# Patient Record
Sex: FEMALE | Race: WHITE | NOT HISPANIC OR LATINO | Employment: UNEMPLOYED | URBAN - METROPOLITAN AREA
[De-identification: names, ages, dates, MRNs, and addresses within clinical notes are randomized per-mention and may not be internally consistent; named-entity substitution may affect disease eponyms.]

---

## 2024-02-20 ENCOUNTER — HOSPITAL ENCOUNTER (EMERGENCY)
Facility: HOSPITAL | Age: 24
Discharge: HOME | End: 2024-02-20
Attending: EMERGENCY MEDICINE

## 2024-02-20 VITALS
HEIGHT: 62 IN | OXYGEN SATURATION: 100 % | SYSTOLIC BLOOD PRESSURE: 106 MMHG | TEMPERATURE: 99 F | RESPIRATION RATE: 18 BRPM | BODY MASS INDEX: 19.88 KG/M2 | HEART RATE: 73 BPM | DIASTOLIC BLOOD PRESSURE: 60 MMHG | WEIGHT: 108 LBS

## 2024-02-20 DIAGNOSIS — Z34.91 FIRST TRIMESTER PREGNANCY: ICD-10-CM

## 2024-02-20 DIAGNOSIS — R11.2 NAUSEA AND VOMITING, UNSPECIFIED VOMITING TYPE: Primary | ICD-10-CM

## 2024-02-20 LAB
ABO GROUP BLD: NORMAL
ALBUMIN SERPL BCP-MCNC: 4.5 G/DL (ref 3.5–5)
ALP SERPL-CCNC: 35 U/L (ref 34–104)
ALT SERPL W P-5'-P-CCNC: 36 U/L (ref 7–52)
ANION GAP SERPL CALCULATED.3IONS-SCNC: 5 MMOL/L
AST SERPL W P-5'-P-CCNC: 22 U/L (ref 13–39)
B-HCG SERPL-ACNC: ABNORMAL MIU/ML (ref 0–5)
BASOPHILS # BLD AUTO: 0.05 THOUSANDS/ÂΜL (ref 0–0.1)
BASOPHILS NFR BLD AUTO: 1 % (ref 0–1)
BILIRUB SERPL-MCNC: 1.02 MG/DL (ref 0.2–1)
BILIRUB UR QL STRIP: NEGATIVE
BLD GP AB SCN SERPL QL: NEGATIVE
BUN SERPL-MCNC: 9 MG/DL (ref 5–25)
CALCIUM SERPL-MCNC: 9.7 MG/DL (ref 8.4–10.2)
CHLORIDE SERPL-SCNC: 103 MMOL/L (ref 96–108)
CLARITY UR: CLEAR
CO2 SERPL-SCNC: 26 MMOL/L (ref 21–32)
COLOR UR: NORMAL
CREAT SERPL-MCNC: 0.66 MG/DL (ref 0.6–1.3)
EOSINOPHIL # BLD AUTO: 0.07 THOUSAND/ÂΜL (ref 0–0.61)
EOSINOPHIL NFR BLD AUTO: 1 % (ref 0–6)
ERYTHROCYTE [DISTWIDTH] IN BLOOD BY AUTOMATED COUNT: 11.6 % (ref 11.6–15.1)
EXT PREGNANCY TEST URINE: POSITIVE
EXT. CONTROL: ABNORMAL
GFR SERPL CREATININE-BSD FRML MDRD: 124 ML/MIN/1.73SQ M
GLUCOSE SERPL-MCNC: 99 MG/DL (ref 65–140)
GLUCOSE UR STRIP-MCNC: NEGATIVE MG/DL
HCT VFR BLD AUTO: 34.6 % (ref 34.8–46.1)
HGB BLD-MCNC: 12 G/DL (ref 11.5–15.4)
HGB UR QL STRIP.AUTO: NEGATIVE
IMM GRANULOCYTES # BLD AUTO: 0.02 THOUSAND/UL (ref 0–0.2)
IMM GRANULOCYTES NFR BLD AUTO: 0 % (ref 0–2)
KETONES UR STRIP-MCNC: NEGATIVE MG/DL
LEUKOCYTE ESTERASE UR QL STRIP: NEGATIVE
LYMPHOCYTES # BLD AUTO: 1.66 THOUSANDS/ÂΜL (ref 0.6–4.47)
LYMPHOCYTES NFR BLD AUTO: 19 % (ref 14–44)
MAGNESIUM SERPL-MCNC: 2 MG/DL (ref 1.9–2.7)
MCH RBC QN AUTO: 30 PG (ref 26.8–34.3)
MCHC RBC AUTO-ENTMCNC: 34.7 G/DL (ref 31.4–37.4)
MCV RBC AUTO: 87 FL (ref 82–98)
MONOCYTES # BLD AUTO: 0.83 THOUSAND/ÂΜL (ref 0.17–1.22)
MONOCYTES NFR BLD AUTO: 10 % (ref 4–12)
NEUTROPHILS # BLD AUTO: 5.95 THOUSANDS/ÂΜL (ref 1.85–7.62)
NEUTS SEG NFR BLD AUTO: 69 % (ref 43–75)
NITRITE UR QL STRIP: NEGATIVE
NRBC BLD AUTO-RTO: 0 /100 WBCS
PH UR STRIP.AUTO: 7 [PH]
PLATELET # BLD AUTO: 240 THOUSANDS/UL (ref 149–390)
PMV BLD AUTO: 9.6 FL (ref 8.9–12.7)
POTASSIUM SERPL-SCNC: 3.8 MMOL/L (ref 3.5–5.3)
PROT SERPL-MCNC: 6.9 G/DL (ref 6.4–8.4)
PROT UR STRIP-MCNC: NEGATIVE MG/DL
RBC # BLD AUTO: 4 MILLION/UL (ref 3.81–5.12)
RH BLD: NEGATIVE
SODIUM SERPL-SCNC: 134 MMOL/L (ref 135–147)
SP GR UR STRIP.AUTO: 1.01 (ref 1–1.03)
SPECIMEN EXPIRATION DATE: NORMAL
UROBILINOGEN UR STRIP-ACNC: <2 MG/DL
WBC # BLD AUTO: 8.58 THOUSAND/UL (ref 4.31–10.16)

## 2024-02-20 PROCEDURE — 96376 TX/PRO/DX INJ SAME DRUG ADON: CPT

## 2024-02-20 PROCEDURE — 36415 COLL VENOUS BLD VENIPUNCTURE: CPT | Performed by: EMERGENCY MEDICINE

## 2024-02-20 PROCEDURE — 84702 CHORIONIC GONADOTROPIN TEST: CPT | Performed by: EMERGENCY MEDICINE

## 2024-02-20 PROCEDURE — 81003 URINALYSIS AUTO W/O SCOPE: CPT | Performed by: EMERGENCY MEDICINE

## 2024-02-20 PROCEDURE — 87086 URINE CULTURE/COLONY COUNT: CPT | Performed by: EMERGENCY MEDICINE

## 2024-02-20 PROCEDURE — 86900 BLOOD TYPING SEROLOGIC ABO: CPT | Performed by: EMERGENCY MEDICINE

## 2024-02-20 PROCEDURE — 86901 BLOOD TYPING SEROLOGIC RH(D): CPT | Performed by: EMERGENCY MEDICINE

## 2024-02-20 PROCEDURE — 83735 ASSAY OF MAGNESIUM: CPT | Performed by: EMERGENCY MEDICINE

## 2024-02-20 PROCEDURE — 80053 COMPREHEN METABOLIC PANEL: CPT | Performed by: EMERGENCY MEDICINE

## 2024-02-20 PROCEDURE — 96361 HYDRATE IV INFUSION ADD-ON: CPT

## 2024-02-20 PROCEDURE — 81025 URINE PREGNANCY TEST: CPT | Performed by: EMERGENCY MEDICINE

## 2024-02-20 PROCEDURE — 86850 RBC ANTIBODY SCREEN: CPT | Performed by: EMERGENCY MEDICINE

## 2024-02-20 PROCEDURE — 96374 THER/PROPH/DIAG INJ IV PUSH: CPT

## 2024-02-20 PROCEDURE — 85025 COMPLETE CBC W/AUTO DIFF WBC: CPT | Performed by: EMERGENCY MEDICINE

## 2024-02-20 PROCEDURE — 99283 EMERGENCY DEPT VISIT LOW MDM: CPT

## 2024-02-20 PROCEDURE — 99284 EMERGENCY DEPT VISIT MOD MDM: CPT | Performed by: EMERGENCY MEDICINE

## 2024-02-20 PROCEDURE — 87147 CULTURE TYPE IMMUNOLOGIC: CPT | Performed by: EMERGENCY MEDICINE

## 2024-02-20 RX ORDER — ONDANSETRON 4 MG/1
4 TABLET, ORALLY DISINTEGRATING ORAL EVERY 8 HOURS PRN
Qty: 12 TABLET | Refills: 0 | Status: SHIPPED | OUTPATIENT
Start: 2024-02-20 | End: 2024-02-25

## 2024-02-20 RX ORDER — DEXTROSE AND SODIUM CHLORIDE 5; .9 G/100ML; G/100ML
500 INJECTION, SOLUTION INTRAVENOUS ONCE
Status: DISCONTINUED | OUTPATIENT
Start: 2024-02-20 | End: 2024-02-20

## 2024-02-20 RX ORDER — ONDANSETRON 4 MG/1
4 TABLET, ORALLY DISINTEGRATING ORAL ONCE
Status: COMPLETED | OUTPATIENT
Start: 2024-02-20 | End: 2024-02-20

## 2024-02-20 RX ORDER — ONDANSETRON 2 MG/ML
4 INJECTION INTRAMUSCULAR; INTRAVENOUS ONCE
Status: COMPLETED | OUTPATIENT
Start: 2024-02-20 | End: 2024-02-20

## 2024-02-20 RX ADMIN — ONDANSETRON 4 MG: 4 TABLET, ORALLY DISINTEGRATING ORAL at 18:45

## 2024-02-20 RX ADMIN — ONDANSETRON 4 MG: 2 INJECTION INTRAMUSCULAR; INTRAVENOUS at 18:09

## 2024-02-20 RX ADMIN — ONDANSETRON 4 MG: 2 INJECTION INTRAMUSCULAR; INTRAVENOUS at 16:53

## 2024-02-20 RX ADMIN — SODIUM CHLORIDE 1000 ML: 0.9 INJECTION, SOLUTION INTRAVENOUS at 16:53

## 2024-02-20 NOTE — ED PROVIDER NOTES
History  Chief Complaint   Patient presents with    Vomiting     Patient c/o nausea and vomiting that started over the weekend. Patient reports being 6 weeks pregnant. Patient denies any bleeding or spotting.       History provided by:  Patient  Vomiting  Severity:  Moderate  Duration:  1 week  Timing:  Constant  Quality:  Stomach contents  Able to tolerate:  Liquids  Progression:  Unchanged  Chronicity:  New  Recent urination:  Decreased  Context: not post-tussive and not self-induced    Relieved by:  Nothing  Worsened by:  Nothing  Ineffective treatments:  None tried  Associated symptoms: abdominal pain (epigastric)    Associated symptoms: no diarrhea    Risk factors: pregnant (Pt states she is about 6 weeks pregnancy and LMP was . Has not seen OB yet. Taking prentatal capsules but making nausea worse.Pt is .)    Risk factors: no alcohol use, no diabetes, no prior abdominal surgery, no sick contacts, no suspect food intake and no travel to endemic areas        None       History reviewed. No pertinent past medical history.    History reviewed. No pertinent surgical history.    History reviewed. No pertinent family history.  I have reviewed and agree with the history as documented.    E-Cigarette/Vaping    E-Cigarette Use Never User      E-Cigarette/Vaping Substances     Social History     Tobacco Use    Smoking status: Never    Smokeless tobacco: Never   Vaping Use    Vaping status: Never Used   Substance Use Topics    Alcohol use: Never    Drug use: Never       Review of Systems   Gastrointestinal:  Positive for abdominal pain (epigastric) and vomiting. Negative for diarrhea.   All other systems reviewed and are negative.      Physical Exam  Physical Exam  Vitals and nursing note reviewed.   Constitutional:       General: She is not in acute distress.     Appearance: She is well-developed. She is not diaphoretic.   HENT:      Head: Normocephalic and atraumatic.      Nose: Nose normal.   Eyes:       Extraocular Movements: Extraocular movements intact.      Conjunctiva/sclera: Conjunctivae normal.   Cardiovascular:      Rate and Rhythm: Normal rate and regular rhythm.      Heart sounds: Normal heart sounds.   Pulmonary:      Effort: Pulmonary effort is normal. No respiratory distress.      Breath sounds: Normal breath sounds.   Abdominal:      General: There is no distension.      Palpations: Abdomen is soft.      Tenderness: There is no abdominal tenderness.   Musculoskeletal:         General: No deformity. Normal range of motion.      Cervical back: Neck supple.   Skin:     General: Skin is warm and dry.   Neurological:      General: No focal deficit present.      Mental Status: She is alert and oriented to person, place, and time.      Cranial Nerves: No cranial nerve deficit.      Motor: No weakness.   Psychiatric:         Mood and Affect: Mood normal.         Vital Signs  ED Triage Vitals [02/20/24 1620]   Temperature Pulse Respirations Blood Pressure SpO2   99 °F (37.2 °C) 89 18 113/75 99 %      Temp Source Heart Rate Source Patient Position - Orthostatic VS BP Location FiO2 (%)   Temporal Monitor Sitting Left arm --      Pain Score       --           Vitals:    02/20/24 1620   BP: 113/75   Pulse: 89   Patient Position - Orthostatic VS: Sitting         Visual Acuity      ED Medications  Medications   sodium chloride 0.9 % bolus 1,000 mL (0 mL Intravenous Stopped 2/20/24 1753)   ondansetron (ZOFRAN) injection 4 mg (4 mg Intravenous Given 2/20/24 1653)   ondansetron (ZOFRAN) injection 4 mg (4 mg Intravenous Given 2/20/24 1809)       Diagnostic Studies  Results Reviewed       Procedure Component Value Units Date/Time    Quantitative hCG [[id number]]  (Abnormal) Collected: 02/20/24 1649    Lab Status: Final result Specimen: Blood from Arm, Right Updated: 02/20/24 1747     HCG, Quant 90,563 mIU/mL     Narrative:       Expected Ranges:    HCG results between 5 and 25 mIU/mL may be indicative of early pregnancy  but should be interpreted in light of the total clinical presentation.    HCG can rise to detectable levels in yazmin and post menopausal women (0-11.6 mIU/mL).     Approximate               Approximate HCG  Gestation age          Concentration ( mIU/mL)  _____________          ______________________   Weeks                      HCG values  0.2-1                       5-50  1-2                           2-3                         100-5000  3-4                         500-43605  4-5                         1000-54481  5-6                         [id number]  6-8                         57449-393997  8-12                        67150-708799      Comprehensive metabolic panel [[id number]]  (Abnormal) Collected: 02/20/24 1649    Lab Status: Final result Specimen: Blood from Arm, Right Updated: 02/20/24 1714     Sodium 134 mmol/L      Potassium 3.8 mmol/L      Chloride 103 mmol/L      CO2 26 mmol/L      ANION GAP 5 mmol/L      BUN 9 mg/dL      Creatinine 0.66 mg/dL      Glucose 99 mg/dL      Calcium 9.7 mg/dL      AST 22 U/L      ALT 36 U/L      Alkaline Phosphatase 35 U/L      Total Protein 6.9 g/dL      Albumin 4.5 g/dL      Total Bilirubin 1.02 mg/dL      eGFR 124 ml/min/1.73sq m     Narrative:      National Kidney Disease Foundation guidelines for Chronic Kidney Disease (CKD):     Stage 1 with normal or high GFR (GFR > 90 mL/min/1.73 square meters)    Stage 2 Mild CKD (GFR = 60-89 mL/min/1.73 square meters)    Stage 3A Moderate CKD (GFR = 45-59 mL/min/1.73 square meters)    Stage 3B Moderate CKD (GFR = 30-44 mL/min/1.73 square meters)    Stage 4 Severe CKD (GFR = 15-29 mL/min/1.73 square meters)    Stage 5 End Stage CKD (GFR <15 mL/min/1.73 square meters)  Note: GFR calculation is accurate only with a steady state creatinine    Magnesium [[id number]]  (Normal) Collected: 02/20/24 1649    Lab Status: Final result Specimen: Blood from Arm, Right Updated: 02/20/24 1714     Magnesium 2.0 mg/dL     UA w Reflex to  Microscopic w Reflex to Culture [[id number]] Collected: 02/20/24 1651    Lab Status: Final result Specimen: Urine, Clean Catch Updated: 02/20/24 1701     Color, UA Light Yellow     Clarity, UA Clear     Specific Gravity, UA 1.011     pH, UA 7.0     Leukocytes, UA Negative     Nitrite, UA Negative     Protein, UA Negative mg/dl      Glucose, UA Negative mg/dl      Ketones, UA Negative mg/dl      Urobilinogen, UA <2.0 mg/dl      Bilirubin, UA Negative     Occult Blood, UA Negative     URINE COMMENT --    Urine culture [[id number]] Collected: 02/20/24 1651    Lab Status: In process Specimen: Urine, Clean Catch Updated: 02/20/24 1701    CBC and differential [[id number]]  (Abnormal) Collected: 02/20/24 1649    Lab Status: Final result Specimen: Blood from Arm, Right Updated: 02/20/24 1659     WBC 8.58 Thousand/uL      RBC 4.00 Million/uL      Hemoglobin 12.0 g/dL      Hematocrit 34.6 %      MCV 87 fL      MCH 30.0 pg      MCHC 34.7 g/dL      RDW 11.6 %      MPV 9.6 fL      Platelets 240 Thousands/uL      nRBC 0 /100 WBCs      Neutrophils Relative 69 %      Immat GRANS % 0 %      Lymphocytes Relative 19 %      Monocytes Relative 10 %      Eosinophils Relative 1 %      Basophils Relative 1 %      Neutrophils Absolute 5.95 Thousands/µL      Immature Grans Absolute 0.02 Thousand/uL      Lymphocytes Absolute 1.66 Thousands/µL      Monocytes Absolute 0.83 Thousand/µL      Eosinophils Absolute 0.07 Thousand/µL      Basophils Absolute 0.05 Thousands/µL     POCT pregnancy, urine [[id number]]  (Abnormal) Resulted: 02/20/24 1653    Lab Status: Final result Updated: 02/20/24 1653     EXT Preg Test, Ur Positive     Control Valid                   No orders to display              Procedures  Procedures         ED Course                               SBIRT 20yo+      Flowsheet Row Most Recent Value   Initial Alcohol Screen: US AUDIT-C     1. How often do you have a drink containing alcohol? 0 Filed at: 02/20/2024 1621   2. How many drinks  containing alcohol do you have on a typical day you are drinking?  0 Filed at: 02/20/2024 1621   3a. Male UNDER 65: How often do you have five or more drinks on one occasion? 0 Filed at: 02/20/2024 1621   3b. FEMALE Any Age, or MALE 65+: How often do you have 4 or more drinks on one occassion? 0 Filed at: 02/20/2024 1621   Audit-C Score 0 Filed at: 02/20/2024 1621   CARMEN: How many times in the past year have you...    Used an illegal drug or used a prescription medication for non-medical reasons? Never Filed at: 02/20/2024 1621                      Medical Decision Making  22-year-old female G1, P0 is coming in with complaint of nausea vomiting and some epigastric discomfort for the past few days worsened over the weekend.  Patient's last menstrual period was January 9 and since then she has had increasing months of nausea and over the past week has had intermittent lots of vomiting.  No diarrhea.  Denies any urinary symptoms.  Has not seen OB yet.    Amount and/or Complexity of Data Reviewed  Labs: ordered.  Radiology: independent interpretation performed.     Details: Bedside ultrasound done by myself showing IUP with double decidual sign with yolk sac and noted fetal cardiac activity.  Crown-rump length give approximate dates of 6 weeks and 1 day.  Unable to upload images to epic due to error single organ failure.  Images shown to patient and significant other.    Risk  Prescription drug management.  Risk Details: Patient received a liter of fluids and dose of Zofran and feels significantly improved.  Will discharge her with additional ODT Zofran and ambulatory referral to OB.             Disposition  Final diagnoses:   Nausea and vomiting, unspecified vomiting type   First trimester pregnancy     Time reflects when diagnosis was documented in both MDM as applicable and the Disposition within this note       Time User Action Codes Description Comment    2/20/2024  6:06 PM Skyla Montoya Add [R11.2] Nausea and  vomiting, unspecified vomiting type     2/20/2024  6:07 PM Skyla Montoya [Z34.91] First trimester pregnancy           ED Disposition       ED Disposition   Discharge    Condition   Stable    Date/Time   Tue Feb 20, 2024 1757    Comment   Krysta Robertsalejandrochristina discharge to home/self care.                   Follow-up Information       Follow up With Specialties Details Why Contact Info Additional Information    [facility] Call in 1 day  [address] [facility] [address]  [phone] [facility], [address] [place] [address], [place], [address]     [phone]            Patient's Medications   Discharge Prescriptions    ONDANSETRON (ZOFRAN-ODT) 4 MG DISINTEGRATING TABLET    Take 1 tablet (4 mg total) by mouth every 8 (eight) hours as needed for nausea or vomiting       Start Date: 2/20/2024 End Date: 3/21/2024       Order Dose: 4 mg       Quantity: 12 tablet    Refills: 0           PDMP Review       None            ED Provider  Electronically Signed by             Skyla Montoya MD  02/20/24 7195

## 2024-02-21 LAB — BACTERIA UR CULT: ABNORMAL

## 2024-02-25 ENCOUNTER — HOSPITAL ENCOUNTER (EMERGENCY)
Facility: HOSPITAL | Age: 24
Discharge: HOME | End: 2024-02-25
Attending: EMERGENCY MEDICINE | Admitting: EMERGENCY MEDICINE

## 2024-02-25 VITALS
HEART RATE: 80 BPM | TEMPERATURE: 98.6 F | SYSTOLIC BLOOD PRESSURE: 99 MMHG | DIASTOLIC BLOOD PRESSURE: 60 MMHG | OXYGEN SATURATION: 99 % | RESPIRATION RATE: 18 BRPM

## 2024-02-25 DIAGNOSIS — Z34.91 FIRST TRIMESTER PREGNANCY: ICD-10-CM

## 2024-02-25 DIAGNOSIS — O21.9 NAUSEA AND VOMITING IN PREGNANCY: Primary | ICD-10-CM

## 2024-02-25 DIAGNOSIS — R11.2 NAUSEA AND VOMITING, UNSPECIFIED VOMITING TYPE: ICD-10-CM

## 2024-02-25 LAB
ANION GAP SERPL CALCULATED.3IONS-SCNC: 6 MMOL/L
BASOPHILS # BLD AUTO: 0.07 THOUSANDS/ÂΜL (ref 0–0.1)
BASOPHILS NFR BLD AUTO: 1 % (ref 0–1)
BILIRUB UR QL STRIP: NEGATIVE
BUN SERPL-MCNC: 7 MG/DL (ref 5–25)
CALCIUM SERPL-MCNC: 9.7 MG/DL (ref 8.4–10.2)
CHLORIDE SERPL-SCNC: 104 MMOL/L (ref 96–108)
CLARITY UR: CLEAR
CO2 SERPL-SCNC: 25 MMOL/L (ref 21–32)
COLOR UR: ABNORMAL
CREAT SERPL-MCNC: 0.69 MG/DL (ref 0.6–1.3)
EOSINOPHIL # BLD AUTO: 0.11 THOUSAND/ÂΜL (ref 0–0.61)
EOSINOPHIL NFR BLD AUTO: 1 % (ref 0–6)
ERYTHROCYTE [DISTWIDTH] IN BLOOD BY AUTOMATED COUNT: 11.8 % (ref 11.6–15.1)
GFR SERPL CREATININE-BSD FRML MDRD: 123 ML/MIN/1.73SQ M
GLUCOSE SERPL-MCNC: 94 MG/DL (ref 65–140)
GLUCOSE UR STRIP-MCNC: NEGATIVE MG/DL
HCT VFR BLD AUTO: 36.4 % (ref 34.8–46.1)
HGB BLD-MCNC: 12.2 G/DL (ref 11.5–15.4)
HGB UR QL STRIP.AUTO: NEGATIVE
IMM GRANULOCYTES # BLD AUTO: 0.03 THOUSAND/UL (ref 0–0.2)
IMM GRANULOCYTES NFR BLD AUTO: 0 % (ref 0–2)
KETONES UR STRIP-MCNC: ABNORMAL MG/DL
LEUKOCYTE ESTERASE UR QL STRIP: NEGATIVE
LYMPHOCYTES # BLD AUTO: 1.35 THOUSANDS/ÂΜL (ref 0.6–4.47)
LYMPHOCYTES NFR BLD AUTO: 13 % (ref 14–44)
MCH RBC QN AUTO: 29.5 PG (ref 26.8–34.3)
MCHC RBC AUTO-ENTMCNC: 33.5 G/DL (ref 31.4–37.4)
MCV RBC AUTO: 88 FL (ref 82–98)
MONOCYTES # BLD AUTO: 0.6 THOUSAND/ÂΜL (ref 0.17–1.22)
MONOCYTES NFR BLD AUTO: 6 % (ref 4–12)
NEUTROPHILS # BLD AUTO: 8.08 THOUSANDS/ÂΜL (ref 1.85–7.62)
NEUTS SEG NFR BLD AUTO: 79 % (ref 43–75)
NITRITE UR QL STRIP: NEGATIVE
NRBC BLD AUTO-RTO: 0 /100 WBCS
PH UR STRIP.AUTO: 7 [PH]
PLATELET # BLD AUTO: 250 THOUSANDS/UL (ref 149–390)
PMV BLD AUTO: 9.6 FL (ref 8.9–12.7)
POTASSIUM SERPL-SCNC: 4.1 MMOL/L (ref 3.5–5.3)
PROT UR STRIP-MCNC: NEGATIVE MG/DL
RBC # BLD AUTO: 4.14 MILLION/UL (ref 3.81–5.12)
SODIUM SERPL-SCNC: 135 MMOL/L (ref 135–147)
SP GR UR STRIP.AUTO: 1.01 (ref 1–1.03)
UROBILINOGEN UR STRIP-ACNC: <2 MG/DL
WBC # BLD AUTO: 10.24 THOUSAND/UL (ref 4.31–10.16)

## 2024-02-25 PROCEDURE — 99284 EMERGENCY DEPT VISIT MOD MDM: CPT | Performed by: EMERGENCY MEDICINE

## 2024-02-25 PROCEDURE — 81003 URINALYSIS AUTO W/O SCOPE: CPT | Performed by: EMERGENCY MEDICINE

## 2024-02-25 PROCEDURE — 85025 COMPLETE CBC W/AUTO DIFF WBC: CPT | Performed by: EMERGENCY MEDICINE

## 2024-02-25 PROCEDURE — 99284 EMERGENCY DEPT VISIT MOD MDM: CPT

## 2024-02-25 PROCEDURE — 87147 CULTURE TYPE IMMUNOLOGIC: CPT | Performed by: EMERGENCY MEDICINE

## 2024-02-25 PROCEDURE — 96375 TX/PRO/DX INJ NEW DRUG ADDON: CPT

## 2024-02-25 PROCEDURE — 96365 THER/PROPH/DIAG IV INF INIT: CPT

## 2024-02-25 PROCEDURE — 80048 BASIC METABOLIC PNL TOTAL CA: CPT | Performed by: EMERGENCY MEDICINE

## 2024-02-25 PROCEDURE — 87086 URINE CULTURE/COLONY COUNT: CPT | Performed by: EMERGENCY MEDICINE

## 2024-02-25 PROCEDURE — 96366 THER/PROPH/DIAG IV INF ADDON: CPT

## 2024-02-25 PROCEDURE — 36415 COLL VENOUS BLD VENIPUNCTURE: CPT

## 2024-02-25 RX ORDER — DIPHENHYDRAMINE HYDROCHLORIDE 25 MG/1
25 CAPSULE ORAL EVERY 8 HOURS SCHEDULED
Qty: 42 TABLET | Refills: 0 | Status: SHIPPED | OUTPATIENT
Start: 2024-02-25 | End: 2024-03-10

## 2024-02-25 RX ORDER — ONDANSETRON 2 MG/ML
4 INJECTION INTRAMUSCULAR; INTRAVENOUS ONCE
Status: COMPLETED | OUTPATIENT
Start: 2024-02-25 | End: 2024-02-25

## 2024-02-25 RX ORDER — ONDANSETRON 4 MG/1
4 TABLET, ORALLY DISINTEGRATING ORAL EVERY 8 HOURS PRN
Qty: 20 TABLET | Refills: 0 | Status: SHIPPED | OUTPATIENT
Start: 2024-02-25 | End: 2024-03-26

## 2024-02-25 RX ORDER — SODIUM CHLORIDE, SODIUM GLUCONATE, SODIUM ACETATE, POTASSIUM CHLORIDE, MAGNESIUM CHLORIDE, SODIUM PHOSPHATE, DIBASIC, AND POTASSIUM PHOSPHATE .53; .5; .37; .037; .03; .012; .00082 G/100ML; G/100ML; G/100ML; G/100ML; G/100ML; G/100ML; G/100ML
1000 INJECTION, SOLUTION INTRAVENOUS ONCE
Status: COMPLETED | OUTPATIENT
Start: 2024-02-25 | End: 2024-02-25

## 2024-02-25 RX ADMIN — ONDANSETRON 4 MG: 2 INJECTION INTRAMUSCULAR; INTRAVENOUS at 11:23

## 2024-02-25 RX ADMIN — SODIUM CHLORIDE, SODIUM GLUCONATE, SODIUM ACETATE, POTASSIUM CHLORIDE, MAGNESIUM CHLORIDE, SODIUM PHOSPHATE, DIBASIC, AND POTASSIUM PHOSPHATE 1000 ML: .53; .5; .37; .037; .03; .012; .00082 INJECTION, SOLUTION INTRAVENOUS at 11:23

## 2024-02-25 NOTE — ED PROVIDER NOTES
History  Chief Complaint   Patient presents with    Vomiting During Pregnancy     6 wks pregnant, N/V unable to keep anything down      23-year-old  female currently approximately 6.5 weeks pregnant who presents for evaluation with nausea and vomiting.  Patient states that she has been experiencing symptoms for the past few weeks.  She has been unable to keep anything down.  Patient was seen in the emergency department for the same thing 5 days ago and was prescribed Zofran at that time.  Patient states she has been using Zofran but that it only slightly helps with her symptoms.  She denies any associated vaginal bleeding, lower abdominal cramping, urinary symptoms, fevers, chills, chest pain, shortness of breath, or other concerning symptoms.        Prior to Admission Medications   Prescriptions Last Dose Informant Patient Reported? Taking?   ondansetron (ZOFRAN-ODT) 4 mg disintegrating tablet   No No   Sig: Take 1 tablet (4 mg total) by mouth every 8 (eight) hours as needed for nausea or vomiting   ondansetron (ZOFRAN-ODT) 4 mg disintegrating tablet   No Yes   Sig: Take 1 tablet (4 mg total) by mouth every 8 (eight) hours as needed for nausea or vomiting      Facility-Administered Medications: None       History reviewed. No pertinent past medical history.    History reviewed. No pertinent surgical history.    History reviewed. No pertinent family history.  I have reviewed and agree with the history as documented.    E-Cigarette/Vaping    E-Cigarette Use Never User      E-Cigarette/Vaping Substances     Social History     Tobacco Use    Smoking status: Never    Smokeless tobacco: Never   Vaping Use    Vaping status: Never Used   Substance Use Topics    Alcohol use: Never    Drug use: Never       Review of Systems   Constitutional:  Negative for fever.   Respiratory:  Negative for shortness of breath.    Cardiovascular:  Negative for chest pain.   Gastrointestinal:  Positive for nausea and vomiting. Negative  for abdominal pain.   Genitourinary:  Negative for dysuria and vaginal bleeding.   All other systems reviewed and are negative.      Physical Exam  Physical Exam  Vitals and nursing note reviewed.   Constitutional:       General: She is awake. She is not in acute distress.     Appearance: She is ill-appearing. She is not toxic-appearing.   HENT:      Head: Normocephalic and atraumatic.      Mouth/Throat:      Lips: Pink.      Mouth: Mucous membranes are dry.   Eyes:      General: Vision grossly intact. Gaze aligned appropriately.   Cardiovascular:      Rate and Rhythm: Normal rate and regular rhythm.      Heart sounds: Normal heart sounds.   Pulmonary:      Effort: Pulmonary effort is normal. No respiratory distress.      Breath sounds: Normal breath sounds.   Abdominal:      General: There is no distension.      Palpations: Abdomen is soft.      Tenderness: There is no abdominal tenderness.   Musculoskeletal:      Cervical back: Full passive range of motion without pain and neck supple.   Skin:     General: Skin is warm and dry.   Neurological:      General: No focal deficit present.      Mental Status: She is alert and oriented to person, place, and time.         Vital Signs  ED Triage Vitals [02/25/24 0923]   Temperature Pulse Respirations Blood Pressure SpO2   98.6 °F (37 °C) 89 19 112/57 100 %      Temp Source Heart Rate Source Patient Position - Orthostatic VS BP Location FiO2 (%)   Oral -- -- Left arm --      Pain Score       --           Vitals:    02/25/24 0923 02/25/24 1130 02/25/24 1430   BP: 112/57 105/56 99/60   Pulse: 89 63 80         Visual Acuity      ED Medications  Medications   multi-electrolyte (ISOLYTE-S PH 7.4) bolus 1,000 mL (0 mL Intravenous Stopped 2/25/24 1436)   ondansetron (ZOFRAN) injection 4 mg (4 mg Intravenous Given 2/25/24 1123)       Diagnostic Studies  Results Reviewed       Procedure Component Value Units Date/Time    UA w Reflex to Microscopic w Reflex to Culture [[id number]]   (Abnormal) Collected: 02/25/24 1127    Lab Status: Final result Specimen: Urine, Clean Catch Updated: 02/25/24 1139     Color, UA Light Yellow     Clarity, UA Clear     Specific Gravity, UA 1.011     pH, UA 7.0     Leukocytes, UA Negative     Nitrite, UA Negative     Protein, UA Negative mg/dl      Glucose, UA Negative mg/dl      Ketones, UA 20 (1+) mg/dl      Urobilinogen, UA <2.0 mg/dl      Bilirubin, UA Negative     Occult Blood, UA Negative     URINE COMMENT --    Urine culture [[id number]] Collected: 02/25/24 1127    Lab Status: In process Specimen: Urine, Clean Catch Updated: 02/25/24 1139    Basic metabolic panel [[id number]] Collected: 02/25/24 0959    Lab Status: Final result Specimen: Blood from Arm, Left Updated: 02/25/24 1040     Sodium 135 mmol/L      Potassium 4.1 mmol/L      Chloride 104 mmol/L      CO2 25 mmol/L      ANION GAP 6 mmol/L      BUN 7 mg/dL      Creatinine 0.69 mg/dL      Glucose 94 mg/dL      Calcium 9.7 mg/dL      eGFR 123 ml/min/1.73sq m     Narrative:      National Kidney Disease Foundation guidelines for Chronic Kidney Disease (CKD):     Stage 1 with normal or high GFR (GFR > 90 mL/min/1.73 square meters)    Stage 2 Mild CKD (GFR = 60-89 mL/min/1.73 square meters)    Stage 3A Moderate CKD (GFR = 45-59 mL/min/1.73 square meters)    Stage 3B Moderate CKD (GFR = 30-44 mL/min/1.73 square meters)    Stage 4 Severe CKD (GFR = 15-29 mL/min/1.73 square meters)    Stage 5 End Stage CKD (GFR <15 mL/min/1.73 square meters)  Note: GFR calculation is accurate only with a steady state creatinine    CBC and differential [[id number]]  (Abnormal) Collected: 02/25/24 0959    Lab Status: Final result Specimen: Blood from Arm, Left Updated: 02/25/24 1004     WBC 10.24 Thousand/uL      RBC 4.14 Million/uL      Hemoglobin 12.2 g/dL      Hematocrit 36.4 %      MCV 88 fL      MCH 29.5 pg      MCHC 33.5 g/dL      RDW 11.8 %      MPV 9.6 fL      Platelets 250 Thousands/uL      nRBC 0 /100 WBCs      Neutrophils  Relative 79 %      Immat GRANS % 0 %      Lymphocytes Relative 13 %      Monocytes Relative 6 %      Eosinophils Relative 1 %      Basophils Relative 1 %      Neutrophils Absolute 8.08 Thousands/µL      Immature Grans Absolute 0.03 Thousand/uL      Lymphocytes Absolute 1.35 Thousands/µL      Monocytes Absolute 0.60 Thousand/µL      Eosinophils Absolute 0.11 Thousand/µL      Basophils Absolute 0.07 Thousands/µL                    No orders to display              Procedures  Procedures         ED Course  ED Course as of 24 1837   Sun 2024   1139 Ketones, UA(!): 20 (1+)                               SBIRT 22yo+      Flowsheet Row Most Recent Value   Initial Alcohol Screen: US AUDIT-C     1. How often do you have a drink containing alcohol? 0 Filed at: 2024 1133   2. How many drinks containing alcohol do you have on a typical day you are drinking?  0 Filed at: 2024 1133   3b. FEMALE Any Age, or MALE 65+: How often do you have 4 or more drinks on one occassion? 0 Filed at: 2024 1133   Audit-C Score 0 Filed at: 2024 1133   CARMEN: How many times in the past year have you...    Used an illegal drug or used a prescription medication for non-medical reasons? Never Filed at: 2024 1133                      Medical Decision Making  23-year-old  female currently approximately 6.5 weeks pregnant who presents for evaluation with nausea and vomiting. On exam, patient with normal vitals, in no acute distress, although she is ill appearing. She is noted to have dry mucous membranes. Exam is otherwise benign. I suspect that the nausea and vomiting is secondary to the pregnancy. CBC and CMP ordered by nursing staff and appear to be within normal limits. Will treat symptomatically with IV fluids and zofran. UA ordered for evaluation of urine ketones, as well as for possible UTI.    Patient's UA negative for signs of infection, and only 1+ ketones at this time. After 1 liter of IV fluids and  a dose of IV zofran, patient reports improvement in her symptoms, and she appears to be more comfortable and better hydrated on reevaluation. Given that this is her second visit for this, will try treating symptomatically with vitamin B6 TID with doxylamine PRN. Patient was encouraged to use these before taking more doses of zofran, but she was given a refill of zofran for back-up. Patient was encouraged to follow up with her OB for further evaluation. She was discharged home in stable condition with symptomatic care instructions and strict ED return precautions.     Amount and/or Complexity of Data Reviewed  Labs: ordered. Decision-making details documented in ED Course.    Risk  OTC drugs.  Prescription drug management.             Disposition  Final diagnoses:   Nausea and vomiting in pregnancy     Time reflects when diagnosis was documented in both MDM as applicable and the Disposition within this note       Time User Action Codes Description Comment    2/25/2024  2:07 PM Sheree Washington [R11.2] Nausea and vomiting, unspecified vomiting type     2/25/2024  2:07 PM Sheree Washington [Z34.91] First trimester pregnancy     2/25/2024  2:08 PM Sheree Washington [O21.9] Nausea and vomiting in pregnancy           ED Disposition       ED Disposition   Discharge    Condition   Stable    Date/Time   Sun Feb 25, 2024 1733    Comment   Krysta Santos discharge to home/self care.                   Follow-up Information       Follow up With Specialties Details Why Contact Info Additional Information    [facility] Emergency Department Emergency Medicine Go to  If symptoms worsen [address] [facility] [id number] [phone] [facility] Emergency Department, [address] [place], [place], [address]            Discharge Medication List as of 2/25/2024  2:08 PM        START taking these medications    Details   doxylamine (UNISOM) 25 MG tablet Take 0.5 tablets  (12.5 mg total) by mouth every 12 (twelve) hours as needed for nausea, Starting Sun 2/25/2024, Normal      Pyridoxine HCl (vitamin B-6) 25 MG tablet Take 1 tablet (25 mg total) by mouth every 8 (eight) hours for 14 days, Starting Sun 2/25/2024, Until Sun 3/10/2024, Normal           CONTINUE these medications which have CHANGED    Details   ondansetron (ZOFRAN-ODT) 4 mg disintegrating tablet Take 1 tablet (4 mg total) by mouth every 8 (eight) hours as needed for nausea or vomiting, Starting Sun 2/25/2024, Until Tue 3/26/2024 at 2359, Normal             No discharge procedures on file.    PDMP Review       None            ED Provider  Electronically Signed by             Sheree Washington DO  02/27/24 0871

## 2024-02-27 LAB
BACTERIA UR CULT: ABNORMAL
BACTERIA UR CULT: ABNORMAL

## 2024-03-14 ENCOUNTER — INITIAL PRENATAL (OUTPATIENT)
Dept: OBGYN CLINIC | Facility: CLINIC | Age: 24
End: 2024-03-14

## 2024-03-14 ENCOUNTER — TELEPHONE (OUTPATIENT)
Age: 24
End: 2024-03-14

## 2024-03-14 VITALS — DIASTOLIC BLOOD PRESSURE: 72 MMHG | WEIGHT: 110 LBS | SYSTOLIC BLOOD PRESSURE: 100 MMHG | BODY MASS INDEX: 20.12 KG/M2

## 2024-03-14 DIAGNOSIS — O21.9 NAUSEA/VOMITING IN PREGNANCY: ICD-10-CM

## 2024-03-14 DIAGNOSIS — N91.1 SECONDARY AMENORRHEA: ICD-10-CM

## 2024-03-14 DIAGNOSIS — R82.71 GROUP B STREPTOCOCCAL BACTERIURIA: ICD-10-CM

## 2024-03-14 DIAGNOSIS — Z34.90 EARLY STAGE OF PREGNANCY: Primary | ICD-10-CM

## 2024-03-14 PROCEDURE — 99204 OFFICE O/P NEW MOD 45 MIN: CPT | Performed by: STUDENT IN AN ORGANIZED HEALTH CARE EDUCATION/TRAINING PROGRAM

## 2024-03-14 PROCEDURE — 76817 TRANSVAGINAL US OBSTETRIC: CPT | Performed by: STUDENT IN AN ORGANIZED HEALTH CARE EDUCATION/TRAINING PROGRAM

## 2024-03-14 RX ORDER — ONDANSETRON 4 MG/1
4 TABLET, ORALLY DISINTEGRATING ORAL EVERY 8 HOURS PRN
Qty: 20 TABLET | Refills: 0 | Status: SHIPPED | OUTPATIENT
Start: 2024-03-14 | End: 2024-04-13

## 2024-03-14 NOTE — PROGRESS NOTES
Ultrasound Probe Disinfection    A transvaginal ultrasound was performed.   Prior to use, disinfection was performed with High Level Disinfection Process ("[employer]  Probe serial number [id number] was used.    Early stage of pregnancy  23yo  at 9.2wks by LMP consistent with BSUS performed today, [facility] 10/15/24, presents for dating US     Pt denies pelvic cramping or vaginal bleeding.     -continue PNVs   -bleeding precautions provided   -return for M US and initial prenatal visit       Nausea/vomiting in pregnancy  -s/p two ED visits for persistent nausea vomiting, currently well controlled with unisom and zofran  -refill for zofran provided    Group B streptococcal bacteriuria  -urine culture positive for GBS, will need antibiotics in labor     ROS: admits to nausea, denies headaches, changes in vision, chest pain, palpitations, shortness of breath, vomiting, fevers, chills     PE:  General: alert and oriented, resting comfortably, no acute distress  Cardiac: regular rate  Pulmonary: no respiratory distress  Abdomen: soft, nondistended, no rebound or guarding, nontender   : external vulva without lesions, redness, tenderness   Extremities: no edema or calf tenderness bilaterally

## 2024-03-14 NOTE — ASSESSMENT & PLAN NOTE
23yo  at 9.2wks by LMP consistent with BSUS performed today, [facility] 10/15/24, presents for dating US     Pt denies pelvic cramping or vaginal bleeding.     -continue PNVs   -bleeding precautions provided   -return for [facility] US and initial prenatal visit

## 2024-03-14 NOTE — PROGRESS NOTES
Information obtained from chart review, not directly discussed with patient. Will readdress an initial prenatal visit. '

## 2024-03-14 NOTE — ASSESSMENT & PLAN NOTE
-s/p two ED visits for persistent nausea vomiting, currently well controlled with unisom and zofran  -refill for zofran provided

## 2024-03-19 ENCOUNTER — TELEPHONE (OUTPATIENT)
Age: 24
End: 2024-03-19

## 2024-03-19 NOTE — TELEPHONE ENCOUNTER
Pt called stating she has an appt sched for this Fri the 22nd and her insurance has not come through yet. She wanted to know if she will be ok to still go to the appt she said she was made to pay $30 before and the rest of the bill got sent to her.

## 2024-03-22 ENCOUNTER — PATIENT MESSAGE (OUTPATIENT)
Dept: OBGYN CLINIC | Facility: CLINIC | Age: 24
End: 2024-03-22

## 2024-03-22 ENCOUNTER — INITIAL PRENATAL (OUTPATIENT)
Dept: OBGYN CLINIC | Facility: CLINIC | Age: 24
End: 2024-03-22

## 2024-03-22 VITALS — HEIGHT: 63 IN | WEIGHT: 110 LBS | BODY MASS INDEX: 19.49 KG/M2

## 2024-03-22 DIAGNOSIS — Z83.2 FAMILY HISTORY OF LUPUS ANTICOAGULANT DISORDER: ICD-10-CM

## 2024-03-22 DIAGNOSIS — Z82.49 FAMILY HISTORY OF DVT: ICD-10-CM

## 2024-03-22 DIAGNOSIS — Z34.01 ENCOUNTER FOR SUPERVISION OF NORMAL FIRST PREGNANCY IN FIRST TRIMESTER: Primary | ICD-10-CM

## 2024-03-22 DIAGNOSIS — Z83.49 FAMILY HISTORY OF MTHFR DEFICIENCY: ICD-10-CM

## 2024-03-22 DIAGNOSIS — Z36.9 ENCOUNTER FOR ANTENATAL SCREENING OF MOTHER: ICD-10-CM

## 2024-03-22 DIAGNOSIS — Z31.430 ENCOUNTER OF FEMALE FOR TESTING FOR GENETIC DISEASE CARRIER STATUS FOR PROCREATIVE MANAGEMENT: ICD-10-CM

## 2024-03-22 NOTE — PATIENT INSTRUCTIONS
Congratulations!! Please review our Pregnancy Essential Guide and [facility] L&D Virtual tour from our networks website.     [facility] Pregnancy Essentials Guide  [facility] ([email or web address])     [facility] ([employer])

## 2024-03-22 NOTE — PROGRESS NOTES
OB INTAKE INTERVIEW  Patient is 24 y.o.y.o. who presents for OB intake at 10wks  She is accompanied by herself during this encounter  The father of her baby (Obi Santos) is involved in the pregnancy and is 31 years old.      Last Menstrual Period: 2024  Ultrasound: Measured 9 weeks 6 days on 3/14  Estimated Date of Delivery: 10/15/24 confirmed by 9 week US    Signs/Symptoms of Pregnancy  Current pregnancy symptoms: nausea (causing anxiety patient really does not like being nauseous), insomnia  Constipation YES, zofran use- hydration  oz recommended   Headaches no  Cramping/spotting no  PICA cravings no    Diabetes-  Body mass index is 19.49 kg/m².  If patient has 1 or more, please order early 1 hour GTT  History of GDM no  BMI >35 no  History of PCOS or current metformin use no  History of LGA/macrosomic infant (4000g/9lbs) no    If patient has 2 or more, please order early 1 hour GTT  BMI>30 no  AMA no  First degree relative with type 2 diabetes no  History of chronic HTN, hyperlipidemia, elevated A1C no  High risk race (, , ,  or ) no    Hypertension- if you answer yes to any of the following, please order baseline preeclampsia labs (cbc, comprehensive metabolic panel, urine protein creatinine ratio, uric acid)  History of of chronic HTN no  History of gestational HTN no  History of preeclampsia, eclampsia, or HELLP syndrome no  History of diabetes no  History of lupus, autoimmune disease, kidney disease no    Thyroid- if yes order TSH with reflex T4  History of thyroid disease no    Bleeding Disorder or Hx of DVT-patient or first degree relative with history of. Order the following if not done previously.   (Factor V, antithrombin III, prothrombin gene mutation, protein C and S Ag, lupus anticoagulant, anticardiolipin, beta-2 glycoprotein)   YES    OB/GYN-  History of abnormal pap smear no       Date of last pap smear  in [place]-  patient is going to attempt to get records  History of HPV no  History of Herpes/HSV no  History of other STI (gonorrhea, chlamydia, trich) no  History of prior  no  History of prior  no  History of  delivery prior to 36 weeks 6 days no  History of blood transfusion no  Ok for blood transfusion YES    Substance screening-   History of tobacco use no  Currently using tobacco no  Substance Use Screen Level (N/A, LOW, HIGH) NA    MRSA Screening-   Does the pt have a hx of MRSA? no    Immunizations:  Influenza vaccine given this season no, declines  Discussed Tdap vaccine yes  Discussed COVID Vaccine yes    Genetic/MFM-  Do you or your partner have a history of any of the following in yourselves or first degree relatives?  Cystic fibrosis no  Spinal muscular atrophy no  Hemoglobinopathy/Sickle Cell/Thalassemia no  Fragile X Intellectual Disability no    If yes, discuss Carrier Screening and recommend consultation with MFM/Genetic Counseling and place specific [facility] Referral for.    If no, discuss Carrier Screening being completed once in a lifetime as a standard of care lab test. Place orders for Cystic Fibrosis Gene Test (VTT221) and Spinal Muscular Atrophy DNA ([id number])      Appointment for Nuchal Translucency Ultrasound at [facility] scheduled for /       Interview education  [facility] Pregnancy Essentials Book reviewed, discussed and attached to their AVS yes    Nurse/Family Partnership- patient may qualify no; referral placed no    Prenatal lab work scripts yes  Extra labs ordered:  DVT panel  CF/SMA DNA     Aspirin/Preeclampsia Screen    Risk Level Risk Factor Recommendation   LOW Prior Uncomplicated full-term delivery no No Aspirin recommendation        MODERATE Nulliparity YES Recommend low-dose aspirin if     BMI>30 no 2 or more moderate risk factors    Family History Preeclampsia (mother/sister) no     35yr old or greater no      or Low Socioeconomic no     IVF Pregnancy  no      Personal History Risks (low birth weight, prior adverse preg outcome, >10yr preg interval) no         HIGH History of Preeclampsia no Recommend low-dose aspirin if     Multifetal gestation no 1 or more high risk factors    Chronic HTN no     Type 1 or 2 Diabetes no     Renal Disease no     Autoimmune Disease  no      Contraindications to ASA therapy:  NSAID/ ASA allergy: no  Nasal polyps: no  Asthma with history of ASA induced bronchospasm: no  Relative contraindications:  History of GI bleed: no  Active peptic ulcer disease: no  Severe hepatic dysfunction: no    Patient should be recommended to take ASA 162mg during this pregnancy from 12-36wks to lower her risk of preeclampsia: does not meet criteria.       The patient has a history now or in prior pregnancy notable for:  Family hx of MTHFR mutation, Lupus anticoagulant, family hx of DVT      Details that I feel the provider should be aware of: This is an unplanned but welcomed pregnancy for Krysta and her  Obi. This is their first child! [facility] are new patients to [facility]. [place]yn care received in [place] patient is going to try to get her records sent over. Last pap was about 2 years ago. Patient has a history of depression and has stopped taking her sertraline with pregnancy. She is doing well so far off of it, she has noticed and increase of anxiety related to nausea though. She hates being nauseous and is very anxious about this during labor. Discussed [facility] therapist and counselors if she feels the needs to talk to someone regarding her anxiety, will ask for referral when she feels its necessary. She has a family history of her father having a DVT and was diagnosed with a MTHFR mutation, lupus anticoagulant, and hyperchromic normocytic anemia w/ thyrombocytosis. DVT panel was ordered. Patient aware of PN labs and carrier screening. Aware of PA for labs and scheduling call. Phone number provided if she does not hear from them in 7 business days.      PN1 visit scheduled. The patient was oriented to our practice, the navigator role, reviewed delivering physicians and [facility] for Delivery. All questions were answered.    Interviewed by: Alexa Laughlin RN

## 2024-04-05 ENCOUNTER — ROUTINE PRENATAL (OUTPATIENT)
Dept: PERINATAL CARE | Facility: OTHER | Age: 24
End: 2024-04-05

## 2024-04-05 VITALS
DIASTOLIC BLOOD PRESSURE: 50 MMHG | SYSTOLIC BLOOD PRESSURE: 90 MMHG | HEART RATE: 88 BPM | HEIGHT: 62 IN | BODY MASS INDEX: 20.02 KG/M2 | WEIGHT: 108.8 LBS

## 2024-04-05 DIAGNOSIS — Z36.82 ENCOUNTER FOR NUCHAL TRANSLUCENCY TESTING: ICD-10-CM

## 2024-04-05 DIAGNOSIS — O21.9 NAUSEA/VOMITING IN PREGNANCY: ICD-10-CM

## 2024-04-05 DIAGNOSIS — O36.80X0 ENCOUNTER TO DETERMINE FETAL VIABILITY OF PREGNANCY, SINGLE OR UNSPECIFIED FETUS: Primary | ICD-10-CM

## 2024-04-05 DIAGNOSIS — Z3A.12 12 WEEKS GESTATION OF PREGNANCY: ICD-10-CM

## 2024-04-05 PROBLEM — Z34.90 PREGNANCY: Status: ACTIVE | Noted: 2024-01-09

## 2024-04-05 PROCEDURE — 76801 OB US < 14 WKS SINGLE FETUS: CPT | Performed by: OBSTETRICS & GYNECOLOGY

## 2024-04-05 PROCEDURE — 99243 OFF/OP CNSLTJ NEW/EST LOW 30: CPT | Performed by: OBSTETRICS & GYNECOLOGY

## 2024-04-05 PROCEDURE — 76813 OB US NUCHAL MEAS 1 GEST: CPT | Performed by: OBSTETRICS & GYNECOLOGY

## 2024-04-05 RX ORDER — ONDANSETRON 4 MG/1
4 TABLET, ORALLY DISINTEGRATING ORAL EVERY 8 HOURS PRN
Qty: 20 TABLET | Refills: 0 | Status: SHIPPED | OUTPATIENT
Start: 2024-04-05 | End: 2024-04-10 | Stop reason: SDUPTHER

## 2024-04-05 NOTE — PROGRESS NOTES
The patient was seen today for an ultrasound.  Please see ultrasound report (located under Ob Procedures) for additional details.   Thank you very much for allowing us to participate in the care of this very nice patient.  Should you have any questions, please do not hesitate to contact me.     Honorio Hazel MD FACOG  Attending Physician, Maternal-Fetal Medicine  [facility]

## 2024-04-10 ENCOUNTER — APPOINTMENT (OUTPATIENT)
Age: 24
End: 2024-04-10
Payer: COMMERCIAL

## 2024-04-10 ENCOUNTER — INITIAL PRENATAL (OUTPATIENT)
Age: 24
End: 2024-04-10

## 2024-04-10 VITALS
OXYGEN SATURATION: 99 % | DIASTOLIC BLOOD PRESSURE: 62 MMHG | WEIGHT: 107.4 LBS | HEIGHT: 62 IN | HEART RATE: 78 BPM | SYSTOLIC BLOOD PRESSURE: 108 MMHG | BODY MASS INDEX: 19.77 KG/M2

## 2024-04-10 DIAGNOSIS — Z11.3 SCREEN FOR STD (SEXUALLY TRANSMITTED DISEASE): ICD-10-CM

## 2024-04-10 DIAGNOSIS — O21.9 NAUSEA/VOMITING IN PREGNANCY: ICD-10-CM

## 2024-04-10 DIAGNOSIS — Z12.4 CERVICAL CANCER SCREENING: ICD-10-CM

## 2024-04-10 DIAGNOSIS — Z3A.13 13 WEEKS GESTATION OF PREGNANCY: Primary | ICD-10-CM

## 2024-04-10 PROBLEM — Z34.90 PREGNANCY: Status: RESOLVED | Noted: 2024-01-09 | Resolved: 2024-04-10

## 2024-04-10 LAB
SL AMB  POCT GLUCOSE, UA: NEGATIVE
SL AMB POCT URINE PROTEIN: NEGATIVE

## 2024-04-10 PROCEDURE — G0145 SCR C/V CYTO,THINLAYER,RESCR: HCPCS

## 2024-04-10 PROCEDURE — PNV

## 2024-04-10 PROCEDURE — 87591 N.GONORRHOEAE DNA AMP PROB: CPT

## 2024-04-10 PROCEDURE — 81002 URINALYSIS NONAUTO W/O SCOPE: CPT

## 2024-04-10 PROCEDURE — 87491 CHLMYD TRACH DNA AMP PROBE: CPT

## 2024-04-10 RX ORDER — SERTRALINE HYDROCHLORIDE 25 MG/1
25 TABLET, FILM COATED ORAL DAILY
COMMUNITY

## 2024-04-10 RX ORDER — ONDANSETRON 4 MG/1
4 TABLET, ORALLY DISINTEGRATING ORAL EVERY 8 HOURS PRN
Qty: 20 TABLET | Refills: 0 | Status: SHIPPED | OUTPATIENT
Start: 2024-04-10 | End: 2024-05-10

## 2024-04-10 NOTE — PROGRESS NOTES
"Problem   13 Weeks Gestation of Pregnancy    Blood Type: A negative.   Pap Not on file.   GC/CT -  collected at pn1  PN1 Labs- pending    28 Week Labs-  COVID vaccine-   Flu vaccine -  Blue folder- reviewed at intake  Yellow folder-    Genetic screening- declined  AFP-  Level 1- 24  Level 2-    Yellow folder-  TDAP -   Rhogam -   Delivery consent-  Breast pump -   Pediatrician -    Perineal massage -  GBS swab -   IOL -     Pregnancy (Resolved)     13 weeks gestation of pregnancy  PN1Lelia Chaparro is a 24 y.o. year old  at 13w1d who presents for initial prenatal visit. Planned pregnancy with  Obi. She is feeling well other than her nausea and vomiting. This is mostly improved with zofran but still present. Previous gynecological care was completed in her home country in [place]. Denies h/o abnormal pap smears.   LMP: Patient's last menstrual period was 2024 (exact date).    Gestational age 13w1d based on LMP Yes , consistent with early US Yes    1  Para 0000           Previous C Section: No    PMHx noncontributory.   Her obstetrical, medical, surgical and family history was reviewed.  Prepregnancy BMI: 20.11    Vitals:    04/10/24 0841   BP: 108/62   BP Location: Left arm   Patient Position: Sitting   Pulse: 78   SpO2: 99%   Weight: 48.7 kg (107 lb 6.4 oz)   Height: 5' 2\" (1.575 m)     Physical Exam   Constitutional: Alert. Normal appearance. No acute distress.   HEENT: Atraumatic, Normocephalic, Conjunctivae clear  Heart: pulses regular  Lungs: Effort normal.  Abdomen: soft, nontender, gravid uterus  Musculoskeletal: Normal ROM  Skin: warm and dry.   Psychological: Normal mood, thought content, behavior & judgement   Pelvic exam was performed with patient supine, lithotomy position.   External genital is without rash, tenderness, lesion, or skin changes.   Inguinal canal negative for hernia and adenopathy bilaterally   Urethral meatus and urethra are normal  Vagina without erythema, " lesions, tenderness, or foreign body in the vagina. No unusual vaginal discharge   Cervix is closed without discharge or lesion.  Uterus is enlarged, consistent with 13 week gestation     She has nausea and vomiting  She has had no vaginal bleeding  Patients has no complaints  Denies pelvic pain or cramping.     NT scan scheduled on 4/5/24. She is not planning to complete genetic screening.    Allergies: Patient has no known allergies.  Medication use :   Current Outpatient Medications   Medication Sig Dispense Refill    doxylamine (UNISOM) 25 MG tablet Take 0.5 tablets (12.5 mg total) by mouth every 12 (twelve) hours as needed for nausea 30 tablet 0    ondansetron (ZOFRAN-ODT) 4 mg disintegrating tablet Take 1 tablet (4 mg total) by mouth every 8 (eight) hours as needed for nausea or vomiting 20 tablet 0    Prenatal Vit w/Ip-Hygwddcvk-RB (PNV PO) Take by mouth      Pyridoxine HCl (vitamin B-6) 25 MG tablet Take 1 tablet (25 mg total) by mouth every 8 (eight) hours for 14 days 42 tablet 0     No current facility-administered medications for this visit.     She is a non-smoker    Prenatal Labs   A negative  pending  GCCT collected today   Pap Not on file. - collected today    Risk factors for this pregnancy include: none    Patient Education: Patient was counseled regarding diet, exercise, weight gain, foods to avoid, vaccines in pregnancy, aneuploidy screening, travel precautions to include seat belt use and VTE risk reduction.  She has been provided our pregnancy packet which includes pregnancy warning signs,how and when to contact providers, visit intervals, Maternal fetal medicine information, medication recommendations that are safe during pregnancy, dietary suggestions, activity recommendations, breastfeeding information as well as websites for additional information, and delivery location.

## 2024-04-10 NOTE — ASSESSMENT & PLAN NOTE
"PN1.  Krysta is a 24 y.o. year old  at 13w1d who presents for initial prenatal visit. Planned pregnancy with  Obi. She is feeling well other than her nausea and vomiting. This is mostly improved with zofran but still present. Previous gynecological care was completed in her home country in [place]. Denies h/o abnormal pap smears.   LMP: Patient's last menstrual period was 2024 (exact date).    Gestational age 13w1d based on LMP Yes , consistent with early US Yes    1  Para 0000           Previous C Section: No    PMHx noncontributory.   Her obstetrical, medical, surgical and family history was reviewed.  Prepregnancy BMI: 20.11    Vitals:    04/10/24 0841   BP: 108/62   BP Location: Left arm   Patient Position: Sitting   Pulse: 78   SpO2: 99%   Weight: 48.7 kg (107 lb 6.4 oz)   Height: 5' 2\" (1.575 m)     Physical Exam   Constitutional: Alert. Normal appearance. No acute distress.   HEENT: Atraumatic, Normocephalic, Conjunctivae clear  Heart: pulses regular  Lungs: Effort normal.  Abdomen: soft, nontender, gravid uterus  Musculoskeletal: Normal ROM  Skin: warm and dry.   Psychological: Normal mood, thought content, behavior & judgement   Pelvic exam was performed with patient supine, lithotomy position.   External genital is without rash, tenderness, lesion, or skin changes.   Inguinal canal negative for hernia and adenopathy bilaterally   Urethral meatus and urethra are normal  Vagina without erythema, lesions, tenderness, or foreign body in the vagina. No unusual vaginal discharge   Cervix is closed without discharge or lesion.  Uterus is enlarged, consistent with 13 week gestation     She has nausea and vomiting  She has had no vaginal bleeding  Patients has no complaints  Denies pelvic pain or cramping.     NT scan scheduled on 24. She is not planning to complete genetic screening.    Allergies: Patient has no known allergies.  Medication use :   Current Outpatient Medications "   Medication Sig Dispense Refill    doxylamine (UNISOM) 25 MG tablet Take 0.5 tablets (12.5 mg total) by mouth every 12 (twelve) hours as needed for nausea 30 tablet 0    ondansetron (ZOFRAN-ODT) 4 mg disintegrating tablet Take 1 tablet (4 mg total) by mouth every 8 (eight) hours as needed for nausea or vomiting 20 tablet 0    Prenatal Vit w/Ww-Pvjeexptb-YZ (PNV PO) Take by mouth      Pyridoxine HCl (vitamin B-6) 25 MG tablet Take 1 tablet (25 mg total) by mouth every 8 (eight) hours for 14 days 42 tablet 0     No current facility-administered medications for this visit.     She is a non-smoker    Prenatal Labs   A negative  pending  GCCT collected today   Pap Not on file. - collected today    Risk factors for this pregnancy include: none    Patient Education: Patient was counseled regarding diet, exercise, weight gain, foods to avoid, vaccines in pregnancy, aneuploidy screening, travel precautions to include seat belt use and VTE risk reduction.  She has been provided our pregnancy packet which includes pregnancy warning signs,how and when to contact providers, visit intervals, Maternal fetal medicine information, medication recommendations that are safe during pregnancy, dietary suggestions, activity recommendations, breastfeeding information as well as websites for additional information, and delivery location.

## 2024-04-10 NOTE — PATIENT INSTRUCTIONS
Pregnancy at 11 to 14 Weeks   AMBULATORY CARE:   Changes happening to your body:  You are now at the end of your first trimester and entering your second trimester. Morning sickness usually goes away by this time. You may have other symptoms such as fatigue, frequent urination, and headaches. You may have gained 2 to 4 pounds by now.  Seek care immediately if:   You have pain or cramping in your abdomen or low back.    You have heavy vaginal bleeding or clotting.    You pass material that looks like tissue or large clots. Collect the material and bring it with you.    Call your doctor or obstetrician if:   You cannot keep food or drinks down, and you are losing weight.    You have light vaginal bleeding.    You have chills or a fever.    You have vaginal itching, burning, or pain.    You have yellow, green, white, or foul-smelling vaginal discharge.    You have pain or burning when you urinate, less urine than usual, or pink or bloody urine.    You have questions or concerns about your condition or care.    How to care for yourself at this stage of your pregnancy:       Get plenty of rest.  You may feel more tired than normal. You may need to take naps or go to bed earlier.    Manage nausea and vomiting.  Avoid fatty and spicy foods. Eat small meals throughout the day instead of large meals. [place] may help to decrease nausea. Ask your healthcare provider about other ways of decreasing nausea and vomiting.    Eat a variety of healthy foods.  Healthy foods include fruits, vegetables, whole-grain breads, low-fat dairy foods, beans, lean meats, and fish. Drink liquids as directed. Ask how much liquid to drink each day and which liquids are best for you. Limit caffeine to less than 200 milligrams each day. Limit your intake of fish to 2 servings each week. Choose fish low in mercury such as canned light tuna, shrimp, salmon, cod, or tilapia. Do not  eat fish high in mercury such as swordfish, tilefish, hector mackerel,  and shark.         Take prenatal vitamins as directed.  Your need for certain vitamins and minerals, such as folic acid, increases during pregnancy. Prenatal vitamins provide some of the extra vitamins and minerals you need. Prenatal vitamins may also help to decrease the risk of certain birth defects.         Do not smoke.  Smoking increases your risk of a miscarriage and other health problems during your pregnancy. Smoking can cause your baby to be born too early or weigh less at birth. Ask your healthcare provider for information if you need help quitting.    Do not drink alcohol.  Alcohol passes from your body to your baby through the placenta. It can affect your baby's brain development and cause fetal alcohol syndrome (FAS). FAS is a group of conditions that causes mental, behavior, and growth problems.    Talk to your healthcare provider before you take any medicines.  Many medicines may harm your baby if you take them when you are pregnant. Do not take any medicines, vitamins, herbs, or supplements without first talking to your healthcare provider. Never use illegal or street drugs (such as marijuana or cocaine) while you are pregnant.  Safety tips during pregnancy:   Avoid hot tubs and saunas.  Do not use a hot tub or sauna while you are pregnant, especially during your first trimester. Hot tubs and saunas may raise your baby's temperature and increase the risk of birth defects.    Avoid toxoplasmosis.  This is an infection caused by eating raw meat or being around infected cat feces. It can cause birth defects, miscarriages, and other problems. Wash your hands after you touch raw meat. Make sure any meat is well-cooked before you eat it. Avoid raw eggs and unpasteurized milk. Use gloves or ask someone else to clean your cat's litter box while you are pregnant.    Changes happening with your baby:  Your baby has fully formed fingernails and toenails. Your baby's heartbeat can now be heard. Ask your  healthcare provider if you can listen to your baby's heartbeat. By week 14, your baby is over 4 inches long from the top of the head to the rump (baby's bottom). Your baby weighs over 3 ounces.  Prenatal care:  Prenatal care is a series of visits with your healthcare provider throughout your pregnancy. During the first 28 weeks of your pregnancy, you will see your healthcare provider 1 time each month. Prenatal care can help prevent problems during pregnancy and childbirth. Your healthcare provider will check your blood pressure and weight. Your baby's heart rate will also be checked. You may also need the following at some visits:  A pelvic exam  allows your healthcare provider to see your cervix (the bottom part of your uterus). Your healthcare provider will use a speculum to open your vagina. He or she will check the size and shape of your uterus.    Blood tests  may be done to check for any of the following:    Gestational diabetes or anemia (low iron level)    Blood type or Rh factor, or certain birth defects    Immunity to certain diseases, such as chickenpox or rubella    An infection, such as a sexually transmitted infection, HIV, or hepatitis B    Hepatitis B  may need to be prevented or treated. Hepatitis B is inflammation of the liver caused by the hepatitis B virus (HBV). HBV can spread from a mother to her baby during delivery. You will be checked for HBV as early as possible in the first trimester of each pregnancy. You need the test even if you received the hepatitis B vaccine or were tested before. You may need to have an HBV infection treated before you give birth.    Urine tests  may also be done to check for sugar and protein. These can be signs of gestational diabetes or preeclampsia. Urine tests may also be done to check for signs of infection.    A fetal ultrasound  shows pictures of your baby inside your uterus. The pictures are used to check your baby's development, movement, and position.          Genetic disorder screening tests  may be offered to you. These tests check your baby's risk for genetic disorders such as Down syndrome. A screening test includes a blood test and ultrasound.    Follow up with your doctor or obstetrician as directed:  Go to all prenatal visits. Write down your questions so you remember to ask them during your visits.  © Copyright [employer] 2023 Information is for End User's use only and may not be sold, redistributed or otherwise used for commercial purposes.  The above information is an  only. It is not intended as medical advice for individual conditions or treatments. Talk to your doctor, nurse or pharmacist before following any medical regimen to see if it is safe and effective for you.  Nausea and Vomiting in Pregnancy   WHAT YOU NEED TO KNOW:   What do I need to know about nausea and vomiting in pregnancy?  Nausea and vomiting can happen any time of day. These symptoms usually start before the 9th week of pregnancy, and end by the 14th week (second trimester). Some women can have nausea and vomiting for a longer time. These symptoms can make it hard for you to do your daily activities.   What increases my risk for nausea and vomiting in pregnancy?   Being pregnant with more than one baby    Nausea and vomiting in a past pregnancy    Having a sister or mother who had nausea and vomiting during pregnancy    History of migraine headaches or motion sickness    Being pregnant with a female baby    How is nausea and vomiting in pregnancy treated?  Treatment for nausea and vomiting in pregnancy is usually not needed. You can make changes in the foods you eat and in your activities to help manage your symptoms. You may need to try several things to learn what works for you. Talk to your healthcare provider if your symptoms do not decrease with the changes suggested below.   What nutrition changes can I make to manage nausea and vomiting?   Eat smaller meals, more  often.  Eat a small snack, such as crackers, dry cereal, or a small sandwich before you go to bed.    Eat some crackers or dry toast before you get out of bed in the morning.  Get out of bed slowly. Sudden movements could cause you to get dizzy and nauseated.     Eat bland foods when you feel nauseated.  Examples of bland foods include dry toast, dry cereal, plain pasta, white rice, and bread. Other bland foods include saltine crackers, bananas, gelatin, and pretzels. Avoid spicy, greasy, and fried foods.    Drink liquids that contain ginger.  Drink ginger ale made with real ginger or ginger tea made with fresh grated ginger. Ginger capsules or ginger candies may also help to decrease nausea and vomiting.     Drink liquids between meals instead of with meals.  Wait at least 30 minutes after you eat to drink liquids. Drink small amounts of liquids often throughout the day to prevent dehydration. Ask how much liquid you should drink each day.    What other changes can I make to manage nausea and vomiting?   Avoid smells that bother you.  Strong odors may cause nausea and vomiting to start, or make it worse.     Do not brush your teeth right after you eat  if it makes you nauseated.    Rest when you need to.  Start activity slowly and work up to your usual routine as you start to feel better.    Talk to your healthcare provider about your prenatal vitamins.  Prenatal vitamins can cause nausea for some women. Try taking your prenatal vitamin at night or with a snack. If this change does not help, your healthcare provider may recommend a different type of vitamin.     Light to moderate exercise  may help to decrease your symptoms. It may also help you to sleep better at night. Ask your healthcare provider about the best exercise plan for you.    When should I seek immediate care?   You are dizzy, cold, and thirsty, and your eyes and mouth are dry.    You are urinating very little or not at all.    You are dizzy or  "lightheaded when you stand up.    You see blood or material that looks like coffee grounds in your vomit.    When should I call my doctor?   You vomit more than 4 times in 1 day.    You have not been able to keep liquids down for more than 1 day.    You lose more than 2 pounds.    You have a fever.    Your nausea and vomiting continue longer than 14 weeks.     You have questions or concerns about your condition or care.    CARE AGREEMENT:   You have the right to help plan your care. Learn about your health condition and how it may be treated. Discuss treatment options with your healthcare providers to decide what care you want to receive. You always have the right to refuse treatment. The above information is an  only. It is not intended as medical advice for individual conditions or treatments. Talk to your doctor, nurse or pharmacist before following any medical regimen to see if it is safe and effective for you.  © 2023 Information is for End User's use only and may not be sold, redistributed or otherwise used for commercial purposes.  Valuable Online Resource:    [facility] pregnancy essential guide    https://www.[facility].org/womens/obstetrics/pregnancy-essentials-guide      On the right side of the screen is a 50 page guide providing valuable information about your entire pregnancy.    On the left hand side of the site you will see several other links to great information and resources that [facility] offers     If you click on the tab that says \"Pregnancy and Birth Packet\" this opens another  guide to labor and delivery information as well as breast feeding information,  care, pediatricians, car seat safety and much more     The [facility] tab has a virtual tour of the new L&D unit, as well as valuable information about classes that are offered, breast feeding support, support groups and much more.     I highly recommend the virtual Breast Feeding class, " very informational even if you have breast fed in the past. Check for available dates !    Click around and enjoy all we have to offer!    Please note that all information in regards to locations and visiting hours have not been updated due to COVID    Your delivery location is [facility] @ [address] [place] [address]

## 2024-04-12 LAB
C TRACH DNA SPEC QL NAA+PROBE: NEGATIVE
N GONORRHOEA DNA SPEC QL NAA+PROBE: NEGATIVE

## 2024-04-15 ENCOUNTER — APPOINTMENT (OUTPATIENT)
Age: 24
End: 2024-04-15
Payer: COMMERCIAL

## 2024-04-15 DIAGNOSIS — Z83.49 FAMILY HISTORY OF MTHFR DEFICIENCY: ICD-10-CM

## 2024-04-15 DIAGNOSIS — Z82.49 FAMILY HISTORY OF DVT: ICD-10-CM

## 2024-04-15 DIAGNOSIS — Z34.01 ENCOUNTER FOR SUPERVISION OF NORMAL FIRST PREGNANCY IN FIRST TRIMESTER: ICD-10-CM

## 2024-04-15 DIAGNOSIS — Z83.2 FAMILY HISTORY OF LUPUS ANTICOAGULANT DISORDER: ICD-10-CM

## 2024-04-15 LAB
ABO GROUP BLD: NORMAL
BACTERIA UR QL AUTO: ABNORMAL /HPF
BASOPHILS # BLD AUTO: 0.05 THOUSANDS/ÂΜL (ref 0–0.1)
BASOPHILS NFR BLD AUTO: 1 % (ref 0–1)
BILIRUB UR QL STRIP: NEGATIVE
BLD GP AB SCN SERPL QL: NEGATIVE
CLARITY UR: ABNORMAL
COLOR UR: ABNORMAL
EOSINOPHIL # BLD AUTO: 0.16 THOUSAND/ÂΜL (ref 0–0.61)
EOSINOPHIL NFR BLD AUTO: 2 % (ref 0–6)
ERYTHROCYTE [DISTWIDTH] IN BLOOD BY AUTOMATED COUNT: 12.9 % (ref 11.6–15.1)
GLUCOSE UR STRIP-MCNC: NEGATIVE MG/DL
HCT VFR BLD AUTO: 34.2 % (ref 34.8–46.1)
HGB BLD-MCNC: 11.2 G/DL (ref 11.5–15.4)
HGB UR QL STRIP.AUTO: NEGATIVE
HIV 1+2 AB+HIV1 P24 AG SERPL QL IA: NORMAL
HIV 2 AB SERPL QL IA: NORMAL
HIV1 AB SERPL QL IA: NORMAL
HIV1 P24 AG SERPL QL IA: NORMAL
IMM GRANULOCYTES # BLD AUTO: 0.03 THOUSAND/UL (ref 0–0.2)
IMM GRANULOCYTES NFR BLD AUTO: 0 % (ref 0–2)
KETONES UR STRIP-MCNC: NEGATIVE MG/DL
LEUKOCYTE ESTERASE UR QL STRIP: ABNORMAL
LYMPHOCYTES # BLD AUTO: 1.15 THOUSANDS/ÂΜL (ref 0.6–4.47)
LYMPHOCYTES NFR BLD AUTO: 15 % (ref 14–44)
MCH RBC QN AUTO: 29.9 PG (ref 26.8–34.3)
MCHC RBC AUTO-ENTMCNC: 32.7 G/DL (ref 31.4–37.4)
MCV RBC AUTO: 91 FL (ref 82–98)
MONOCYTES # BLD AUTO: 0.53 THOUSAND/ÂΜL (ref 0.17–1.22)
MONOCYTES NFR BLD AUTO: 7 % (ref 4–12)
MUCOUS THREADS UR QL AUTO: ABNORMAL
NEUTROPHILS # BLD AUTO: 5.7 THOUSANDS/ÂΜL (ref 1.85–7.62)
NEUTS SEG NFR BLD AUTO: 75 % (ref 43–75)
NITRITE UR QL STRIP: NEGATIVE
NON-SQ EPI CELLS URNS QL MICRO: ABNORMAL /HPF
NRBC BLD AUTO-RTO: 0 /100 WBCS
PH UR STRIP.AUTO: 6 [PH]
PLATELET # BLD AUTO: 197 THOUSANDS/UL (ref 149–390)
PMV BLD AUTO: 11.2 FL (ref 8.9–12.7)
PROT UR STRIP-MCNC: ABNORMAL MG/DL
RBC # BLD AUTO: 3.75 MILLION/UL (ref 3.81–5.12)
RBC #/AREA URNS AUTO: ABNORMAL /HPF
RH BLD: NEGATIVE
RUBV IGG SERPL IA-ACNC: 10.3 IU/ML
SP GR UR STRIP.AUTO: 1.02 (ref 1–1.03)
TREPONEMA PALLIDUM IGG+IGM AB [PRESENCE] IN SERUM OR PLASMA BY IMMUNOASSAY: NORMAL
UROBILINOGEN UR STRIP-ACNC: <2 MG/DL
WBC # BLD AUTO: 7.62 THOUSAND/UL (ref 4.31–10.16)
WBC #/AREA URNS AUTO: ABNORMAL /HPF

## 2024-04-15 PROCEDURE — 86780 TREPONEMA PALLIDUM: CPT

## 2024-04-15 PROCEDURE — 81001 URINALYSIS AUTO W/SCOPE: CPT

## 2024-04-15 PROCEDURE — 87086 URINE CULTURE/COLONY COUNT: CPT

## 2024-04-15 PROCEDURE — 85301 ANTITHROMBIN III ANTIGEN: CPT

## 2024-04-15 PROCEDURE — 86901 BLOOD TYPING SEROLOGIC RH(D): CPT

## 2024-04-15 PROCEDURE — 87340 HEPATITIS B SURFACE AG IA: CPT

## 2024-04-15 PROCEDURE — 85705 THROMBOPLASTIN INHIBITION: CPT

## 2024-04-15 PROCEDURE — 86803 HEPATITIS C AB TEST: CPT

## 2024-04-15 PROCEDURE — 36415 COLL VENOUS BLD VENIPUNCTURE: CPT

## 2024-04-15 PROCEDURE — 86762 RUBELLA ANTIBODY: CPT

## 2024-04-15 PROCEDURE — 86900 BLOOD TYPING SEROLOGIC ABO: CPT

## 2024-04-15 PROCEDURE — 87389 HIV-1 AG W/HIV-1&-2 AB AG IA: CPT

## 2024-04-15 PROCEDURE — 86850 RBC ANTIBODY SCREEN: CPT

## 2024-04-15 PROCEDURE — 85732 THROMBOPLASTIN TIME PARTIAL: CPT

## 2024-04-15 PROCEDURE — 86147 CARDIOLIPIN ANTIBODY EA IG: CPT

## 2024-04-15 PROCEDURE — 85302 CLOT INHIBIT PROT C ANTIGEN: CPT

## 2024-04-15 PROCEDURE — 85025 COMPLETE CBC W/AUTO DIFF WBC: CPT

## 2024-04-15 PROCEDURE — 86146 BETA-2 GLYCOPROTEIN ANTIBODY: CPT

## 2024-04-15 PROCEDURE — 85306 CLOT INHIBIT PROT S FREE: CPT

## 2024-04-15 PROCEDURE — 87147 CULTURE TYPE IMMUNOLOGIC: CPT

## 2024-04-15 PROCEDURE — 85670 THROMBIN TIME PLASMA: CPT

## 2024-04-15 PROCEDURE — 85613 RUSSELL VIPER VENOM DILUTED: CPT

## 2024-04-16 PROBLEM — O09.899 RUBELLA NON-IMMUNE STATUS, ANTEPARTUM: Status: ACTIVE | Noted: 2024-04-16

## 2024-04-16 PROBLEM — Z67.91 RH NEGATIVE STATE IN ANTEPARTUM PERIOD: Status: ACTIVE | Noted: 2024-04-16

## 2024-04-16 PROBLEM — Z28.39 RUBELLA NON-IMMUNE STATUS, ANTEPARTUM: Status: ACTIVE | Noted: 2024-04-16

## 2024-04-16 PROBLEM — O26.899 RH NEGATIVE STATE IN ANTEPARTUM PERIOD: Status: ACTIVE | Noted: 2024-04-16

## 2024-04-16 LAB
B2 GLYCOPROT1 IGA SERPL IA-ACNC: 1.2
B2 GLYCOPROT1 IGG SERPL IA-ACNC: <0.8
B2 GLYCOPROT1 IGM SERPL IA-ACNC: 2.7
BACTERIA UR CULT: ABNORMAL
BACTERIA UR CULT: ABNORMAL
CARDIOLIPIN IGA SER IA-ACNC: 1.8
CARDIOLIPIN IGG SER IA-ACNC: 1.8
CARDIOLIPIN IGM SER IA-ACNC: 5.2
HBV SURFACE AG SER QL: NORMAL
HCV AB SER QL: NORMAL

## 2024-04-18 ENCOUNTER — TELEPHONE (OUTPATIENT)
Age: 24
End: 2024-04-18

## 2024-04-18 DIAGNOSIS — Z82.49 FAMILY HISTORY OF DVT: Primary | ICD-10-CM

## 2024-04-18 LAB
AT III AG ACT/NOR PPP IA: 83 % (ref 72–124)
LAB AP GYN PRIMARY INTERPRETATION: NORMAL
LAB AP LMP: NORMAL
Lab: NORMAL
PROT S ACT/NOR PPP: 47 % (ref 68–108)

## 2024-04-18 NOTE — TELEPHONE ENCOUNTER
This is for the prothrombin and CF/SMA testing- they have their own number to call regarding these prior Auths. Patient should call [phone]

## 2024-04-18 NOTE — TELEPHONE ENCOUNTER
Would you be able to assist with these Pre-Authorizations or does this request need to go to that Department?  Please advise.

## 2024-04-18 NOTE — TELEPHONE ENCOUNTER
Pt spouse called and asking if the provider could pre authorize all the lab work that pt was not able to do in the past like  initial pre  lab work. The  patient's insurance had mentioned that the lab order would need to be pre-authorized. Please follow up with patient.  Pt currently has the ACCESS card medicaid insurance and will need pre-auth for the lab work. [facility]  pt will have [employer] insurance.

## 2024-04-19 ENCOUNTER — TELEPHONE (OUTPATIENT)
Age: 24
End: 2024-04-19

## 2024-04-19 ENCOUNTER — NURSE TRIAGE (OUTPATIENT)
Age: 24
End: 2024-04-19

## 2024-04-19 DIAGNOSIS — O23.41 URINARY TRACT INFECTION IN MOTHER DURING FIRST TRIMESTER OF PREGNANCY: Primary | ICD-10-CM

## 2024-04-19 LAB
APTT SCREEN TO CONFIRM RATIO: 1.09 RATIO (ref 0–1.34)
CONFIRM APTT/NORMAL: 38.5 SEC (ref 0–47.6)
LA PPP-IMP: NORMAL
PROT C AG PPP IA-ACNC: 87 % (ref 60–150)
SCREEN APTT: 36.2 SEC (ref 0–43.5)
SCREEN DRVVT: 36.7 SEC (ref 0–47)
THROMBIN TIME: 15.1 SEC (ref 0–23)

## 2024-04-19 RX ORDER — AMOXICILLIN 500 MG/1
500 CAPSULE ORAL EVERY 8 HOURS SCHEDULED
Qty: 15 CAPSULE | Refills: 0 | Status: SHIPPED | OUTPATIENT
Start: 2024-04-19 | End: 2024-04-24

## 2024-04-19 NOTE — TELEPHONE ENCOUNTER
Urine showed a small amount of GBS. Antibiotic sent to pharmacy. Will not need to be reswabbed at 36 weeks.

## 2024-04-19 NOTE — TELEPHONE ENCOUNTER
"Pt and  called in regarding lab results. States that they saw bacteria in her urine and are concerned. Pt has been having urinary frequency, mild abdominal discomfort, and mild pain in left lower back. Symptoms started 3 days ago. Was having dysuria but that went away. Denies any vaginal bleeding or fever. Will review with provider.   Reason for Disposition   Urinating more frequently than usual (i.e., frequency)    Answer Assessment - Initial Assessment Questions  1. SYMPTOM: \"What's the main symptom you're concerned about?\" (e.g., frequency, incontinence)      Urinary frequency, left sided back pain and mild abdominal discomfort  2. ONSET: \"When did the  symptoms  start?\"      3 days ago  3. PAIN: \"Is there any pain?\" If Yes, ask: \"How bad is it?\" (Scale: 1-10; mild, moderate, severe)      Mild back and abdominal pain  4. CAUSE: \"What do you think is causing the symptoms?\"      UTI?  5. OTHER SYMPTOMS: \"Do you have any other symptoms?\" (e.g., fever, flank pain, blood in urine, pain with urination)      denies  6. PREGNANCY: \"Is there any chance you are pregnant?\" \"When was your last menstrual period?\"      14w3d    Protocols used: Urinary Symptoms-ADULT-[place]    "

## 2024-04-19 NOTE — TELEPHONE ENCOUNTER
Patient spouse called stating jorge luis Gee from [employer] called and left message on answering machine to move up an appointment. Reviewed chart - I cannot see any documentation on why or when patients appointment should be moved to. Called clerical staff in office and they cannot see why either. Elif Crocker I would send a message to Marlen Kim to reach out in regards to genetic screening labs appointment in hopes this is the appointment they are referring to.

## 2024-04-25 ENCOUNTER — APPOINTMENT (OUTPATIENT)
Dept: LAB | Facility: HOSPITAL | Age: 24
End: 2024-04-25
Attending: STUDENT IN AN ORGANIZED HEALTH CARE EDUCATION/TRAINING PROGRAM
Payer: COMMERCIAL

## 2024-04-25 DIAGNOSIS — Z82.49 FAMILY HISTORY OF DVT: ICD-10-CM

## 2024-04-25 DIAGNOSIS — Z36.9 ENCOUNTER FOR ANTENATAL SCREENING OF MOTHER: ICD-10-CM

## 2024-04-25 DIAGNOSIS — Z34.01 ENCOUNTER FOR SUPERVISION OF NORMAL FIRST PREGNANCY IN FIRST TRIMESTER: ICD-10-CM

## 2024-04-25 DIAGNOSIS — Z31.430 ENCOUNTER OF FEMALE FOR TESTING FOR GENETIC DISEASE CARRIER STATUS FOR PROCREATIVE MANAGEMENT: ICD-10-CM

## 2024-04-25 PROCEDURE — 36415 COLL VENOUS BLD VENIPUNCTURE: CPT

## 2024-04-29 ENCOUNTER — NURSE TRIAGE (OUTPATIENT)
Age: 24
End: 2024-04-29

## 2024-04-29 NOTE — TELEPHONE ENCOUNTER
"Patient called states she is 15.6 weeks pregnant and just finished Amoxicillin for UTI. States today discharge is a bit more of a milky white consistency than usual. Denies thick white discharge, itching, fever, vaginal bleeding, severe abdominal pain, or loss of fluid. States she has some redness/irritation. Advised patient if symptoms worsen and if she feels like she has a yeast infection to try Monistat (3 or 7 day) Care advice reviewed. Patient aware to call back if symptoms do not resolve after taking Monistat or worsen. Patient verbalized understanding. No further questions at this time.     Reason for Disposition   Symptoms of a vaginal yeast infection (i.e., white, thick, cottage-cheese-like, itchy, not bad smelling discharge)    Additional Information   Pregnant with vaginal discharge    Answer Assessment - Initial Assessment Questions  1. DISCHARGE: \"Describe the discharge.\" (e.g., white, yellow, green, gray, foamy, cottage cheese-like)      White discharge - liquid  2. ODOR: \"Is there a bad odor?\"      denies  3. ONSET: \"When did the discharge begin?\"      Today - finished amoxicillin 2 days ago  4. RASH: \"Is there a rash in that area?\" If Yes, ask: \"Describe it.\" (e.g., redness, blisters, sores, bumps)      Rash/irritation/redness  5. ABDOMINAL PAIN: \"Are you having any abdominal pain?\" If Yes, ask: \"What does it feel like? \" (e.g., crampy, dull, intermittent, constant)       Within past few days - crampy on left side upper stomach to uterus. Been going on for 3 days. Not much pain today   6. ABDOMINAL PAIN SEVERITY: If present, ask: \"How bad is it?\"  (e.g., mild, moderate, severe)   - MILD - doesn't interfere with normal activities    - MODERATE - interferes with normal activities or awakens from sleep    - SEVERE - patient doesn't want to move (R/O peritonitis)       0/10 today  7. CAUSE: \"What do you think is causing the discharge?\" \"Have you had the same problem before? What happened then?\"      " "Possible yeast infection  8. OTHER SYMPTOMS: \"Do you have any other symptoms?\" (e.g., fever, itching, vaginal bleeding, pain with urination, injury to genital area, vaginal foreign body)      Lower back pain and frequent urination  9. PREGNANCY: \"Is there any chance you are pregnant?\" \"When was your last menstrual period?\"      15.6    Answer Assessment - Initial Assessment Questions  1. DISCHARGE: \"Describe the discharge.\" (e.g., white, yellow, green, gray, foamy, cottage cheese-like)      white  2. ODOR: \"Is there a bad odor?\"      no  3. ONSET: \"When did the discharge begin?\"      today  4. RASH: \"Is there a rash in that area?\" If Yes, ask: \"Describe it.\" (e.g., redness, blisters, sores, bumps)      Redness irritation  5. ABDOMINAL PAIN: \"Are you having any abdominal pain?\" If Yes, ask: \"What does it feel like?\" (e.g., crampy, dull, intermittent, constant)       Not currently  6. ABDOMINAL PAIN SEVERITY: If present, ask: \"How bad is it?\"  (e.g., Scale 1-10; mild, moderate, or severe)    - MILD (1-3): doesn't interfere with normal activities, abdomen soft and not tender to touch     - MODERATE (4-7): interferes with normal activities or awakens from sleep, tender to touch     - SEVERE (8-10): excruciating pain, doubled over, unable to do any normal activities      denies  7. CAUSE: \"What do you think is causing the discharge?\"      unknown  8. OTHER SYMPTOMS: \"Do you have any other symptoms?\" (e.g., fever, itching, vaginal bleeding, pain with urination)      denies  9. MEREDITH: \"What date are you expecting to deliver?\"       10/15/24  10. PREGNANCY: \"How many weeks pregnant are you?\"        15.6    Protocols used: Vaginal Discharge-ADULT-[place], Pregnancy - Vaginal Discharge-ADULT-[place]    "

## 2024-05-01 ENCOUNTER — TELEPHONE (OUTPATIENT)
Facility: HOSPITAL | Age: 24
End: 2024-05-01

## 2024-05-01 ENCOUNTER — OFFICE VISIT (OUTPATIENT)
Dept: PERINATAL CARE | Facility: OTHER | Age: 24
End: 2024-05-01
Payer: COMMERCIAL

## 2024-05-01 ENCOUNTER — TELEPHONE (OUTPATIENT)
Dept: OBGYN CLINIC | Facility: CLINIC | Age: 24
End: 2024-05-01

## 2024-05-01 DIAGNOSIS — Z3A.16 16 WEEKS GESTATION OF PREGNANCY: Primary | ICD-10-CM

## 2024-05-01 DIAGNOSIS — Z33.1 PREGNANT STATE, INCIDENTAL: ICD-10-CM

## 2024-05-01 DIAGNOSIS — Z36.9 UNSPECIFIED ANTENATAL SCREENING: Primary | ICD-10-CM

## 2024-05-01 PROBLEM — Z34.02 ENCOUNTER FOR SUPERVISION OF NORMAL FIRST PREGNANCY IN SECOND TRIMESTER: Status: ACTIVE | Noted: 2024-05-01

## 2024-05-01 LAB
CITATION REF LAB TEST: NORMAL
CLINICAL INFO: NORMAL
ETHNIC BACKGROUND STATED: NORMAL
GENE DIS ANL CARRIER INTERP-IMP: NORMAL
GENE MUT TESTED BLD/T: NORMAL
LAB DIRECTOR NAME PROVIDER: NORMAL
REASON FOR REFERRAL (NARRATIVE): NORMAL
RECOMMENDATION PATIENT DOC-IMP: NORMAL
REF LAB TEST METHOD: NORMAL
SERVICE CMNT-IMP: NORMAL
SMN1 GENE MUT ANL BLD/T: NORMAL
SPECIMEN SOURCE: NORMAL

## 2024-05-01 PROCEDURE — 36415 COLL VENOUS BLD VENIPUNCTURE: CPT | Performed by: OBSTETRICS & GYNECOLOGY

## 2024-05-01 NOTE — TELEPHONE ENCOUNTER
Pt transferred to office from project access.  Pt states that she now received insurance and is interested in NIPS with fetal sex.  Pt would like appointment as soon as possible.  PT offered 2 appointments today in our [place] office (as this is the closest office).  Pt elected today at 3:45pm.  Pt was receptive and declines questions at this time.

## 2024-05-01 NOTE — PATIENT INSTRUCTIONS
Pregnancy at 15 to 18 Weeks   AMBULATORY CARE:   What changes are happening to your body:  Now that you are in your second trimester, you have more energy. You may also feel hungrier than usual. You may start to experience other symptoms, such as heartburn or dizziness. You may be gaining about ½ to 1 pound a week, and your pregnancy is beginning to show. You may need to start wearing maternity clothes.  Seek care immediately if:   You have pain or cramping in your abdomen or low back.    You have heavy vaginal bleeding or clotting.    You pass material that looks like tissue or large clots. Collect the material and bring it with you.    Call your doctor or obstetrician if:   You cannot keep food or drinks down, and you are losing weight.    You have light bleeding.    You have chills or a fever.    You have vaginal itching, burning, or pain.    You have yellow, green, white, or foul-smelling vaginal discharge.    You have pain or burning when you urinate, less urine than usual, or pink or bloody urine.    You have questions or concerns about your condition or care.    How to care for yourself at this stage of your pregnancy:       Manage heartburn  by eating 4 or 5 small meals each day instead of large meals. Avoid spicy foods. Avoid eating right before bedtime.         Manage nausea and vomiting.  Avoid fatty and spicy foods. Eat small meals throughout the day instead of large meals. [place] may help to decrease nausea. Ask your healthcare provider about other ways of decreasing nausea and vomiting.    Eat a variety of healthy foods.  Healthy foods include fruits, vegetables, whole-grain breads, low-fat dairy foods, beans, lean meats, and fish. Drink liquids as directed. Ask how much liquid to drink each day and which liquids are best for you. Limit caffeine to less than 200 milligrams each day. Limit your intake of fish to 2 servings each week. Choose fish low in mercury such as canned light tuna, shrimp, salmon,  cod, or tilapia. Do not  eat fish high in mercury such as swordfish, tilefish, hector mackerel, and shark.         Take prenatal vitamins as directed.  Your need for certain vitamins and minerals, such as folic acid, increases during pregnancy. Prenatal vitamins provide some of the extra vitamins and minerals you need. Prenatal vitamins may also help to decrease the risk of certain birth defects.         Do not smoke.  Smoking increases your risk of a miscarriage and other health problems during your pregnancy. Smoking can cause your baby to be born too early or weigh less at birth. Ask your healthcare provider for information if you need help quitting.    Do not drink alcohol.  Alcohol passes from your body to your baby through the placenta. It can affect your baby's brain development and cause fetal alcohol syndrome (FAS). FAS is a group of conditions that causes mental, behavior, and growth problems.    Talk to your healthcare provider before you take any medicines.  Many medicines may harm your baby if you take them when you are pregnant. Do not take any medicines, vitamins, herbs, or supplements without first talking to your healthcare provider. Never use illegal or street drugs (such as marijuana or cocaine) while you are pregnant.  Safety tips during pregnancy:   Avoid hot tubs and saunas.  Do not use a hot tub or sauna while you are pregnant, especially during your first trimester. Hot tubs and saunas may raise your baby's temperature and increase the risk of birth defects.    Avoid toxoplasmosis.  This is an infection caused by eating raw meat or being around infected cat feces. It can cause birth defects, miscarriages, and other problems. Wash your hands after you touch raw meat. Make sure any meat is well-cooked before you eat it. Avoid raw eggs and unpasteurized milk. Use gloves or ask someone else to clean your cat's litter box while you are pregnant.    Changes that are happening with your baby:  By 18  weeks, your baby may be about 6 inches long from the top of the head to the rump (baby's bottom). Your baby may weigh about 11 ounces. You may be able to feel your baby's movement at about 18 weeks or later. The first movements may not be that noticeable. They may feel like a fluttering sensation. Your baby also makes sucking movements and can hear certain sounds.  What you need to know about prenatal care:  During the first 28 weeks of your pregnancy, you will see your healthcare provider once a month. Your healthcare provider will check your blood pressure and weight. You may also need any of the following:  A urine test  may also be done to check for sugar and protein. These can be signs of gestational diabetes or infection.    A blood test  may be done to check for anemia (low iron level).    Fundal height check  is a measurement of your uterus to check your baby's growth. This number is usually the same as the number of weeks that you have been pregnant.    An ultrasound  may be done to check your baby's development. Your healthcare provider may be able to tell you what your baby's gender is during the ultrasound.         Your baby's heart rate  will be checked.    © Copyright [employer] 2023 Information is for End User's use only and may not be sold, redistributed or otherwise used for commercial purposes.  The above information is an  only. It is not intended as medical advice for individual conditions or treatments. Talk to your doctor, nurse or pharmacist before following any medical regimen to see if it is safe and effective for you.

## 2024-05-01 NOTE — TELEPHONE ENCOUNTER
EPDS sent  Appts scheduled till 28 weeks  Classes scheduled  Mfm appointment 5/31  Pn1 labs done  AFP not ordered yet

## 2024-05-01 NOTE — PROGRESS NOTES
Patient chose to have [facility] [id number] Non-Invasive Prenatal Screen [id number] PLUS w/ SCA, WITH fetal sex.  Patient choose to be billed through insurance.     Patient given brochure and is aware [facility] will contact patient's insurance and coordinate coverage.  Provided [facility] contact information. General inquiries [phone], Cost estimates [phone] and [facility] Billing [phone]. Website [email or web address].     Blood collection tubes labeled with patient identifiers (name, medical record number, and date of birth).     Filled out [facility] order form. Patient chose to have blood drawn in our office at time of visit. NIPS was drawn from left arm with a straight needle by Svitlana STEEN RN.    Maternal Fetal Medicine will have results in approximately 5-7 business days and will call patient or notify via [facility].  Patient aware viewing lab result online will reveal fetal sex if ordered.    Patient verbalized understanding of all instructions and no questions at this time.

## 2024-05-02 NOTE — TELEPHONE ENCOUNTER
Overall how are you doing? Feeling better still having some vomiting from time to time, some back pain     Compliant with routine OB care appointments? Scheduled to 28 weeks     Have you completed your 1st trimester labs? done    If you had NIPS with MFM, do you have a order for MSAFP? Ordered today    Can be completed 15w-22w9d, ideally 16w-18w    Have you seen MFM and do you have your detailed US scheduled? 5/32    Pregnancy Education-have you had a chance to review the classes offered and registered? scheduled    EPDS Score 8

## 2024-05-03 ENCOUNTER — APPOINTMENT (OUTPATIENT)
Age: 24
End: 2024-05-03
Payer: COMMERCIAL

## 2024-05-03 DIAGNOSIS — Z3A.16 16 WEEKS GESTATION OF PREGNANCY: ICD-10-CM

## 2024-05-03 PROCEDURE — 82105 ALPHA-FETOPROTEIN SERUM: CPT

## 2024-05-03 PROCEDURE — 36415 COLL VENOUS BLD VENIPUNCTURE: CPT

## 2024-05-05 LAB
2ND TRIMESTER 4 SCREEN SERPL-IMP: NORMAL
AFP ADJ MOM SERPL: 1.02
AFP INTERP AMN-IMP: NORMAL
AFP INTERP SERPL-IMP: NORMAL
AFP INTERP SERPL-IMP: NORMAL
AFP SERPL-MCNC: 45.6 NG/ML
AGE AT DELIVERY: 24.6 YR
CFDNA.FET/CFDNA.TOTAL SFR FETUS: NORMAL %
CITATION REF LAB TEST: NORMAL
FET 13+18+21+X+Y ANEUP PLAS.CFDNA: NEGATIVE
FET CHR 21 TS PLAS.CFDNA QL: NEGATIVE
FET CHR 21 TS PLAS.CFDNA QL: NEGATIVE
FET MS X RISK WBC.DNA+CFDNA QL: NOT DETECTED
FET SEX PLAS.CFDNA DOSAGE CFDNA: NORMAL
FET TS 13 RISK PLAS.CFDNA QL: NEGATIVE
FET X + Y ANEUP RISK PLAS.CFDNA SEQ-IMP: NOT DETECTED
GA EST FROM CONCEPTION DATE: NORMAL D
GA METHOD: NORMAL
GA: 16.4 WEEKS
GESTATIONAL AGE > 9:: YES
IDDM PATIENT QL: NO
LAB DIRECTOR NAME PROVIDER: NORMAL
LAB DIRECTOR NAME PROVIDER: NORMAL
LABORATORY COMMENT REPORT: NORMAL
LIMITATIONS OF THE TEST: NORMAL
MULTIPLE PREGNANCY: NO
NEGATIVE PREDICTIVE VALUE: NORMAL
NEURAL TUBE DEFECT RISK FETUS: NORMAL %
PERFORMANCE CHARACTERISTICS: NORMAL
POSITIVE PREDICTIVE VALUE: NORMAL
REF LAB TEST METHOD: NORMAL
SL AMB NOTE:: NORMAL
TEST PERFORMANCE INFO SPEC: NORMAL

## 2024-05-06 ENCOUNTER — ROUTINE PRENATAL (OUTPATIENT)
Dept: OBGYN CLINIC | Facility: CLINIC | Age: 24
End: 2024-05-06
Payer: COMMERCIAL

## 2024-05-06 ENCOUNTER — CLINICAL SUPPORT (OUTPATIENT)
Dept: POSTPARTUM | Facility: CLINIC | Age: 24
End: 2024-05-06

## 2024-05-06 VITALS
SYSTOLIC BLOOD PRESSURE: 110 MMHG | OXYGEN SATURATION: 99 % | BODY MASS INDEX: 20.17 KG/M2 | HEIGHT: 62 IN | HEART RATE: 94 BPM | DIASTOLIC BLOOD PRESSURE: 60 MMHG | WEIGHT: 109.6 LBS

## 2024-05-06 DIAGNOSIS — O26.899 RH NEGATIVE STATE IN ANTEPARTUM PERIOD: ICD-10-CM

## 2024-05-06 DIAGNOSIS — Z3A.16 16 WEEKS GESTATION OF PREGNANCY: ICD-10-CM

## 2024-05-06 DIAGNOSIS — O21.9 NAUSEA/VOMITING IN PREGNANCY: ICD-10-CM

## 2024-05-06 DIAGNOSIS — Z34.02 ENCOUNTER FOR SUPERVISION OF NORMAL FIRST PREGNANCY IN SECOND TRIMESTER: Primary | ICD-10-CM

## 2024-05-06 DIAGNOSIS — Z67.91 RH NEGATIVE STATE IN ANTEPARTUM PERIOD: ICD-10-CM

## 2024-05-06 DIAGNOSIS — O09.899 RUBELLA NON-IMMUNE STATUS, ANTEPARTUM: ICD-10-CM

## 2024-05-06 DIAGNOSIS — R82.71 GROUP B STREPTOCOCCAL BACTERIURIA: ICD-10-CM

## 2024-05-06 DIAGNOSIS — Z28.39 RUBELLA NON-IMMUNE STATUS, ANTEPARTUM: ICD-10-CM

## 2024-05-06 DIAGNOSIS — Z32.2 ENCOUNTER FOR CHILDBIRTH INSTRUCTION: Primary | ICD-10-CM

## 2024-05-06 DIAGNOSIS — K21.00 GASTROESOPHAGEAL REFLUX DISEASE WITH ESOPHAGITIS WITHOUT HEMORRHAGE: ICD-10-CM

## 2024-05-06 LAB
SL AMB  POCT GLUCOSE, UA: NORMAL
SL AMB POCT URINE PROTEIN: NORMAL

## 2024-05-06 PROCEDURE — 81002 URINALYSIS NONAUTO W/O SCOPE: CPT | Performed by: NURSE PRACTITIONER

## 2024-05-06 PROCEDURE — 99213 OFFICE O/P EST LOW 20 MIN: CPT | Performed by: NURSE PRACTITIONER

## 2024-05-06 RX ORDER — PRENATAL VIT 91/IRON/FOLIC/DHA 28-975-200
COMBINATION PACKAGE (EA) ORAL
Qty: 30 EACH | Refills: 9 | Status: SHIPPED | OUTPATIENT
Start: 2024-05-06

## 2024-05-06 RX ORDER — PANTOPRAZOLE SODIUM 40 MG/1
40 TABLET, DELAYED RELEASE ORAL DAILY
Qty: 30 TABLET | Refills: 1 | Status: SHIPPED | OUTPATIENT
Start: 2024-05-06 | End: 2024-06-05

## 2024-05-06 RX ORDER — ONDANSETRON 4 MG/1
4 TABLET, ORALLY DISINTEGRATING ORAL EVERY 8 HOURS PRN
Qty: 20 TABLET | Refills: 0 | Status: SHIPPED | OUTPATIENT
Start: 2024-05-06 | End: 2024-06-05

## 2024-05-06 NOTE — PROGRESS NOTES
Problem   16 Weeks Gestation of Pregnancy    Blood Type: A negative.   Pap neg 2024  GC/CT -  neg  PN1 Labs- nml, GBS bacteruria +, Thrombophilia panel ordered-not done yet  28 Week Labs-    Blue folder- reviewed at intake  Yellow folder-    Genetic screening- 5/1  AFP- neg  Level 1- 4/5/24  Level 2- 5/31    Yellow folder-  TDAP -   Rhogam -   Delivery consent-  Breast pump -   Pediatrician -    Perineal massage -  GBS swab -   IOL -       16 weeks gestation of pregnancy  Feels well. Denies LOF/CTX/VB. Discussed fetal awareness. No concerns.  Feels better on her Zofran but is having significant reflux that is not responsive to antacids. She would like to try pantoprazole.  She also requests a prescription for her prenatal to be covered by insurance. Reminded to get her thrombophilia panel done. Overview charting updated today.

## 2024-05-06 NOTE — ASSESSMENT & PLAN NOTE
Feels well. Denies LOF/CTX/VB. Discussed fetal awareness. No concerns.  Feels better on her Zofran but is having significant reflux that is not responsive to antacids. She would like to try pantoprazole.  She also requests a prescription for her prenatal to be covered by insurance. Reminded to get her thrombophilia panel done. Overview charting updated today.

## 2024-05-08 LAB
CFTR FULL MUT ANL BLD/T SEQ: NORMAL
CITATION REF LAB TEST: NORMAL
CLINICAL INFO: NORMAL
ETHNIC BACKGROUND STATED: NORMAL
GENE DIS ANL CARRIER INTERP-IMP: NORMAL
INDICATION: NORMAL
LAB DIRECTOR NAME PROVIDER: NORMAL
RECOMMENDATION PATIENT DOC-IMP: NORMAL
REF LAB TEST METHOD: NORMAL
SERVICE CMNT-IMP: NORMAL
SPECIMEN SOURCE: NORMAL

## 2024-05-10 LAB
F2 GENE MUT ANL BLD/T: NORMAL
Lab: NORMAL

## 2024-05-14 LAB
F5 GENE MUT ANL BLD/T: NORMAL
Lab: NORMAL

## 2024-05-15 LAB — MTHFR GENE MUT ANL BLD/T: NORMAL

## 2024-05-21 DIAGNOSIS — K21.00 GASTROESOPHAGEAL REFLUX DISEASE WITH ESOPHAGITIS WITHOUT HEMORRHAGE: ICD-10-CM

## 2024-05-21 NOTE — TELEPHONE ENCOUNTER
Patient lost medication at home or somewhere. She has one refill.      Can she just  at pharmacy?        Please advise patient.   Detail Level: Detailed Detail Level: Zone

## 2024-05-22 RX ORDER — PANTOPRAZOLE SODIUM 40 MG/1
40 TABLET, DELAYED RELEASE ORAL DAILY
Qty: 30 TABLET | Refills: 1 | Status: SHIPPED | OUTPATIENT
Start: 2024-05-22 | End: 2024-07-21

## 2024-05-31 ENCOUNTER — ROUTINE PRENATAL (OUTPATIENT)
Dept: PERINATAL CARE | Facility: OTHER | Age: 24
End: 2024-05-31
Payer: COMMERCIAL

## 2024-05-31 VITALS
HEIGHT: 62 IN | HEART RATE: 82 BPM | BODY MASS INDEX: 20.83 KG/M2 | WEIGHT: 113.2 LBS | SYSTOLIC BLOOD PRESSURE: 90 MMHG | DIASTOLIC BLOOD PRESSURE: 50 MMHG

## 2024-05-31 DIAGNOSIS — Z36.86 ENCOUNTER FOR ANTENATAL SCREENING FOR CERVICAL LENGTH: ICD-10-CM

## 2024-05-31 DIAGNOSIS — Z36.3 ENCOUNTER FOR ANTENATAL SCREENING FOR MALFORMATION USING ULTRASOUND: Primary | ICD-10-CM

## 2024-05-31 DIAGNOSIS — Z3A.20 20 WEEKS GESTATION OF PREGNANCY: ICD-10-CM

## 2024-05-31 PROCEDURE — 76817 TRANSVAGINAL US OBSTETRIC: CPT | Performed by: OBSTETRICS & GYNECOLOGY

## 2024-05-31 PROCEDURE — 76805 OB US >/= 14 WKS SNGL FETUS: CPT | Performed by: OBSTETRICS & GYNECOLOGY

## 2024-05-31 PROCEDURE — 99213 OFFICE O/P EST LOW 20 MIN: CPT | Performed by: OBSTETRICS & GYNECOLOGY

## 2024-05-31 NOTE — PROGRESS NOTES
On exam today, the patient appears well, in no acute distress, and denies any complaints.    I reviewed the patient's genetic screening.  The patient had noninvasive prenatal testing through [employer] using the [id number] plus test.  Her results were normal, placing her in a very low risk category.  The sensitivity of detecting Trisomy 21 with this test is 99.1% with a specificity of 99.9%.  The sensitivity of detecting Trisomy 18 is >99.9% with a specificity of 99.6%.  The sensitivity of detecting Trisomy 13 is 91.7% with a specificity of 99.7%.  Her negative result is very reassuring that the likelihood of her having a fetus with the aforementioned Trisomies is very low.  Maternal serum alpha-fetoprotein screening was normal at 1.02 multiples of the median    The fetal anatomic survey is complete. There is no sonographic evidence of fetal abnormalities at this time. Good fetal movement and tone are seen. The amniotic fluid volume appears normal. A transvaginal ultrasound was performed to assess the cervix, which was not seen well transabdominally. The cervical length was 3.7 centimeters, which is normal for the current gestational age. There was no significant funneling or dynamic changes appreciated. The patient was informed of today's findings and all of her questions were answered. The limitations of ultrasound were reviewed.    The patient had questions regarding the safety of exercise in pregnancy and overall I reviewed that exercise is safe and encouraged in pregnancy as long as there are no complications such as fetal growth restriction or  labor. I reiterated that there is no evidence to suggest that a mom needs to keep her heart rate bellow a certain level for fetal well being and that moderate exercise is beneficial for both maternal and fetal well being.    Patient questions regarding epidural as well as labor induction.  I encouraged her to further discuss this with her delivering  obstetrician's but we briefly reviewed current literature.    Patient does have some acid reflux.  We discussed the use of various medications including the safety and efficacy and generalized approach as well as nonmedicated mitigation strategies.    We discussed follow-up in detail, and I recommend she return as clinically indicated.    Thank you very much for allowing us to participate in the care of this very nice patient. Should you have any questions, please do not hesitate to contact our office.    Please note I spent 5 minutes reviewing records prior to the visit, 10 minutes in patient contact including counseling and coordination of care, and 5 minutes in charting in the electronic medical record.  Total time spent for the encounter is 20 minutes.

## 2024-05-31 NOTE — PROGRESS NOTES
Ultrasound Probe Disinfection    A transvaginal ultrasound was performed.   Prior to use, disinfection was performed with High Level Disinfection Process (EGT).  Probe serial number M1: [id number]   was used.      Sangita Chambers  05/31/24  9:10 AM

## 2024-06-05 PROBLEM — Z3A.21 21 WEEKS GESTATION OF PREGNANCY: Status: ACTIVE | Noted: 2024-03-14

## 2024-06-05 NOTE — ASSESSMENT & PLAN NOTE
Krysta Santos is a 24 y.o.  21w3d  TWG 2.722 kg (6 lb)  Feels well. Denies LOF/CTX/VB. No concerns.   AFP completed Neg  Vaccines discussed    labor precautions reviewed.   Encouraged adequate hydration and nutrition  Pregnancy Essential guide and [facility] web site recommended.

## 2024-06-05 NOTE — PROGRESS NOTES
21w1d  Pap 04/10/2024. Negative  GC/CT:4/10/2024 Neg /Neg   PN1 Labs: 4/15/2024  Blood Type: A  Negative  will need Rhogam @ 28 weeks  MFM Level 1:2024  MFM Level 2:2024  AFP: 5/3/2024  28 Week Labs:  TDap:  Flu:  GBS:   Blue Folder: given   Yellow Folder:  Ped Referral :  Breast pump:  L&D forms:  Delivery consent:     Patient reports positive fetal movements with no concerns at today's visit .

## 2024-06-05 NOTE — PATIENT INSTRUCTIONS
Pregnancy at 19 to 22 Weeks   WHAT YOU NEED TO KNOW:   Now that you are in your second trimester, you have more energy. You may also be feeling hungrier than usual. You may be gaining about ½ to 1 pound a week, and your pregnancy is beginning to show. You may need to start wearing maternity clothes. As your baby gets larger, you may have other symptoms. These may include body aches or stretch marks on your abdomen, breasts, thighs, or buttocks.  DISCHARGE INSTRUCTIONS:   Return to the emergency department if:   You develop a severe headache that does not go away.    You have new or increased vision changes, such as blurred or spotted vision.    You have new or increased swelling in your face or hands.    You have vaginal spotting or bleeding.    Your water broke or you feel warm water gushing or trickling from your vagina.    Call your doctor or obstetrician if:   You have abdominal cramps, pressure, or tightening.    You have a change in vaginal discharge.    You cannot keep food or drinks down, and you are losing weight.    You have chills or a fever.    You have vaginal itching, burning, or pain.    You have yellow, green, white, or foul-smelling vaginal discharge.    You have pain or burning when you urinate, less urine than usual, or pink or bloody urine.    You have questions or concerns about your condition or care.    How to care for yourself at this stage of your pregnancy:       Eat a variety of healthy foods.  Healthy foods include fruits, vegetables, whole-grain breads, low-fat dairy foods, beans, lean meats, and fish. Drink liquids as directed. Ask how much liquid to drink each day and which liquids are best for you. Limit caffeine to less than 200 milligrams each day. Limit your intake of fish to 2 servings each week. Choose fish low in mercury such as canned light tuna, shrimp, salmon, cod, or tilapia. Do not  eat fish high in mercury such as swordfish, tilefish, hector mackerel, and shark.         Take  prenatal vitamins as directed.  Your need for certain vitamins and minerals, such as folic acid, increases during pregnancy. Prenatal vitamins provide some of the extra vitamins and minerals you need. Prenatal vitamins may also help to decrease the risk of certain birth defects.         Talk to your healthcare provider about exercise.  Moderate exercise can help you stay fit. Your healthcare provider will help you plan an exercise program that is safe for you during pregnancy.         Do not smoke.  Smoking increases your risk of a miscarriage and other health problems during your pregnancy. Smoking can cause your baby to be born too early or weigh less at birth. Ask your healthcare provider for information if you need help quitting.    Do not drink alcohol.  Alcohol passes from your body to your baby through the placenta. It can affect your baby's brain development and cause fetal alcohol syndrome (FAS). FAS is a group of conditions that causes mental, behavior, and growth problems.    Talk to your healthcare provider before you take any medicines.  Many medicines may harm your baby if you take them when you are pregnant. Do not take any medicines, vitamins, herbs, or supplements without first talking to your healthcare provider. Never use illegal or street drugs (such as marijuana or cocaine) while you are pregnant.  Safety tips during pregnancy:   Avoid hot tubs and saunas.  Do not use a hot tub or sauna while you are pregnant, especially during your first trimester. Hot tubs and saunas may raise your baby's temperature and increase the risk of birth defects.    Avoid toxoplasmosis.  This is an infection caused by eating raw meat or being around infected cat feces. It can cause birth defects, miscarriages, and other problems. Wash your hands after you touch raw meat. Make sure any meat is well-cooked before you eat it. Avoid raw eggs and unpasteurized milk. Use gloves or ask someone else to clean your cat's  litter box while you are pregnant.       Changes happening with your baby:  By 22 weeks, your baby is about 8 inches long from the top of the head to the rump (baby's bottom). Your baby also weighs about 1 pound. Your baby is becoming much more active. You may be able to feel the baby move inside you now. The first movements may not be that noticeable. They may feel like a fluttering sensation. As time goes on, your baby's movements will become stronger and more noticeable.  What you need to know about prenatal care:  During the first 28 weeks of your pregnancy, you will see your healthcare provider once a month. Your healthcare provider will check your blood pressure and weight. You may also need the following:  A urine test  may also be done to check for sugar and protein. These can be signs of gestational diabetes or infection. Protein in your urine may also be a sign of preeclampsia. Preeclampsia is a condition that can develop during week 20 or later of your pregnancy. It causes high blood pressure, and it can cause problems with your kidneys and other organs.    Fundal height  is a measurement of your uterus to check your baby's growth. This number is usually the same as the number of weeks that you have been pregnant.    A fetal ultrasound  shows pictures of your baby inside your uterus. It shows your baby's development. The movement and position of your baby can also be seen. Your healthcare provider may be able to tell you what your baby's gender is during the ultrasound.         Your baby's heart rate  will be checked.    Follow up with your obstetrician as directed:  Write down your questions so you remember to ask them during your visits.  © Copyright [employer] 2023 Information is for End User's use only and may not be sold, redistributed or otherwise used for commercial purposes.  The above information is an  only. It is not intended as medical advice for individual conditions or  treatments. Talk to your doctor, nurse or pharmacist before following any medical regimen to see if it is safe and effective for you.

## 2024-06-07 ENCOUNTER — ROUTINE PRENATAL (OUTPATIENT)
Dept: OBGYN CLINIC | Facility: CLINIC | Age: 24
End: 2024-06-07
Payer: COMMERCIAL

## 2024-06-07 VITALS
HEART RATE: 98 BPM | SYSTOLIC BLOOD PRESSURE: 110 MMHG | DIASTOLIC BLOOD PRESSURE: 70 MMHG | WEIGHT: 116 LBS | BODY MASS INDEX: 21.35 KG/M2 | OXYGEN SATURATION: 100 % | HEIGHT: 62 IN

## 2024-06-07 DIAGNOSIS — Z34.02 ENCOUNTER FOR SUPERVISION OF NORMAL FIRST PREGNANCY IN SECOND TRIMESTER: Primary | ICD-10-CM

## 2024-06-07 DIAGNOSIS — Z3A.21 21 WEEKS GESTATION OF PREGNANCY: ICD-10-CM

## 2024-06-07 LAB
SL AMB  POCT GLUCOSE, UA: NORMAL
SL AMB POCT URINE PROTEIN: NORMAL

## 2024-06-07 PROCEDURE — 81002 URINALYSIS NONAUTO W/O SCOPE: CPT | Performed by: OBSTETRICS & GYNECOLOGY

## 2024-06-07 PROCEDURE — 99213 OFFICE O/P EST LOW 20 MIN: CPT | Performed by: OBSTETRICS & GYNECOLOGY

## 2024-06-07 NOTE — PROGRESS NOTES
Problem   21 Weeks Gestation of Pregnancy    Blood Type: A negative.   Pap neg   GC/CT -  neg  PN1 Labs- nml, GBS bacteruria +, Thrombophilia panel ordered-not done yet  28 Week Labs-    Blue folder- reviewed at intake  Yellow folder-    Genetic screening-   AFP- neg  Level 1- 24  Level 2-     Yellow folder-  TDAP -   Rhogam -   Delivery consent-  Breast pump -   Pediatrician -    Perineal massage -  GBS swab -   IOL -       21 weeks gestation of pregnancy  Krysta Santos is a 24 y.o.  21w3d  TWG 2.722 kg (6 lb)  Feels well. Denies LOF/CTX/VB. No concerns.   AFP completed Neg  Vaccines discussed    labor precautions reviewed.   Encouraged adequate hydration and nutrition  Pregnancy Essential guide and Baby and Me center web site recommended.

## 2024-06-14 DIAGNOSIS — O21.9 NAUSEA/VOMITING IN PREGNANCY: ICD-10-CM

## 2024-06-14 RX ORDER — ONDANSETRON 4 MG/1
4 TABLET, ORALLY DISINTEGRATING ORAL EVERY 8 HOURS PRN
Qty: 20 TABLET | Refills: 5 | Status: SHIPPED | OUTPATIENT
Start: 2024-06-14 | End: 2024-07-24

## 2024-06-14 NOTE — TELEPHONE ENCOUNTER
Reason for call:   [x] Refill   [] Prior Auth  [] Other:     Office:   [] PCP/Provider -   [x] Specialty/Provider - Blanche ALDRIDGE     Medication: Zofran     Dose/Frequency: 4mg - ODT every 8 hours PRN nausea and vomiting     Quantity: 20    Pharmacy: [id number]    Does the patient have enough for 3 days?   [] Yes   [x] No - Send as HP to POD

## 2024-06-15 DIAGNOSIS — Z00.6 ENCOUNTER FOR EXAMINATION FOR NORMAL COMPARISON OR CONTROL IN CLINICAL RESEARCH PROGRAM: ICD-10-CM

## 2024-06-18 ENCOUNTER — OFFICE VISIT (OUTPATIENT)
Dept: OBGYN CLINIC | Facility: CLINIC | Age: 24
End: 2024-06-18
Payer: COMMERCIAL

## 2024-06-18 ENCOUNTER — NURSE TRIAGE (OUTPATIENT)
Age: 24
End: 2024-06-18

## 2024-06-18 VITALS — BODY MASS INDEX: 21.44 KG/M2 | DIASTOLIC BLOOD PRESSURE: 58 MMHG | SYSTOLIC BLOOD PRESSURE: 96 MMHG | WEIGHT: 117.2 LBS

## 2024-06-18 DIAGNOSIS — N89.8 VAGINAL DISCHARGE: Primary | ICD-10-CM

## 2024-06-18 PROCEDURE — 99212 OFFICE O/P EST SF 10 MIN: CPT | Performed by: OBSTETRICS & GYNECOLOGY

## 2024-06-18 RX ORDER — PNV NO.52/IRON/FA/OMEGA-3/DHA 29-1-200MG
COMBINATION PACKAGE (EA) ORAL
COMMUNITY
Start: 2024-06-04

## 2024-06-18 NOTE — TELEPHONE ENCOUNTER
"Pt called in, pt is 23w0d , c/o vaginal discharge is white in color- milky consistency as per pt, and burning with urination /10 and itching /10. Pt denies an odor. Pt reporting that this began about 3-4 days ago. Pt reporting contractions starting 1 week ago  but pt denies having contractions at this time. Pt reporting +FM. Pt denies leakage of fluid and vaginal bleeding.         Epic Secure Chat sent to Dr. Beltrán.   Cennox Chat received: Can we see her in the office today?     Attempted to Find appointment for today, unsuccessful, pt was Warm transferred to [facility] in the office for further assistance with scheduling         Reason for Disposition   Patient wants to be seen    Answer Assessment - Initial Assessment Questions  1. DISCHARGE: \"Describe the discharge.\" (e.g., white, yellow, green, gray, foamy, cottage cheese-like)      White discharge  2. ODOR: \"Is there a bad odor?\"      Pt denies   3. ONSET: \"When did the discharge begin?\"      3-4 days ago   4. RASH: \"Is there a rash in that area?\" If Yes, ask: \"Describe it.\" (e.g., redness, blisters, sores, bumps)      Pt reports razor bumps in the area   5. ABDOMINAL PAIN: \"Are you having any abdominal pain?\" If Yes, ask: \"What does it feel like?\" (e.g., crampy, dull, intermittent, constant)       Pt reporting having \"growing belly pain\"   6. ABDOMINAL PAIN SEVERITY: If present, ask: \"How bad is it?\"  (e.g., Scale 1-10; mild, moderate, or severe)    - MILD (1-3): doesn't interfere with normal activities, abdomen soft and not tender to touch     - MODERATE (4-7): interferes with normal activities or awakens from sleep, tender to touch     - SEVERE (8-10): excruciating pain, doubled over, unable to do any normal activities      Pt reports 2/10. Pt reports contractions   7. CAUSE: \"What do you think is causing the discharge?\"      Pt denies, pt denies changing soaps or lotions, or detergents   8. OTHER SYMPTOMS: \"Do you have any other symptoms?\" " "(e.g., fever, itching, vaginal bleeding, pain with urination)      Pt reporting burning with urination   9. MEREDITH: \"What date are you expecting to deliver?\"       10/15/24  10. PREGNANCY: \"How many weeks pregnant are you?\"        23w0d    Protocols used: Pregnancy - Vaginal Discharge-ADULT-[place]    "

## 2024-06-18 NOTE — PROGRESS NOTES
OB problem visit @ 23w0d. Reports milky white discharge with itching x 3-4 days.  Has had yeast infections in the past and believes this is one. Denies odor.    Denies ctxs, VB, LOF.  Good FM.    Many other questions about reflux and hemorrhoids answered.    Wet mount consistent with yeast.    Rx diflucan sent.

## 2024-06-18 NOTE — TELEPHONE ENCOUNTER
Regarding: OB PT 23W vaginal burning and itchness,more then normal discharge  ----- Message from Briana ABRAHAM sent at 6/18/2024 10:58 AM EDT -----  OB pt 23w MREEDITH 10/15/24, has vaginal burning and itchiness, increase in vaginal discharge, no odor for about 3-4 days.       Tried CTS

## 2024-06-19 ENCOUNTER — TELEPHONE (OUTPATIENT)
Age: 24
End: 2024-06-19

## 2024-06-19 DIAGNOSIS — N89.8 VAGINAL DISCHARGE: Primary | ICD-10-CM

## 2024-06-19 RX ORDER — FLUCONAZOLE 150 MG/1
150 TABLET ORAL ONCE
Qty: 1 TABLET | Refills: 0 | Status: SHIPPED | OUTPATIENT
Start: 2024-06-19 | End: 2024-06-19

## 2024-06-19 NOTE — TELEPHONE ENCOUNTER
Patient called stating that she was in on 6/19 to see Dr. Spence. Patient states that there was supposed to be a medication sent in to her pharmacy. She states that they have not received a script yet.  Confirmed Pharmacy is [facility] in [place]

## 2024-06-20 ENCOUNTER — NURSE TRIAGE (OUTPATIENT)
Age: 24
End: 2024-06-20

## 2024-06-20 NOTE — TELEPHONE ENCOUNTER
Regarding: dizziness in pregnancy  ----- Message from Leonela HART sent at 6/20/2024  3:10 PM EDT -----  Pregnant patient who took one dose of fluconazole yesterday for yeast infection is complaining of dizziness and lightheadness

## 2024-06-20 NOTE — TELEPHONE ENCOUNTER
"Krysta took Diflucan yesterday. Noted dizziness today while at work.    Currently in air conditioned room. Unsure if she has had adequate water intake. She generally drinks 7-8 glasses of water per day but unsure ounce. She does not feel she is getting 64 ounce per day.    Advised dizziness is possible SE of Diflucan, if from medication should resolve over next several hours.    May also be due to current extreme heat and inadequate water intake-Encouraged increase water intake.    H/O low blood pressure. Reports she has eaten today.   Encouraged continue to monitor,  call back if dizziness does not resolve with increased water intake or becomes worse or develops new symptoms.         Reason for Disposition   Taking a medicine that could cause dizziness (e.g., blood pressure medications, diuretics)    Answer Assessment - Initial Assessment Questions  1. DESCRIPTION: \"Describe your dizziness.\"      lightheaded  2. LIGHTHEADED: \"Do you feel lightheaded?\" (e.g., somewhat faint, woozy, weak upon standing)      With sitting  3. VERTIGO: \"Do you feel like either you or the room is spinning or tilting?\" (i.e. vertigo)      N/a  4. SEVERITY: \"How bad is it?\"  \"Do you feel like you are going to faint?\" \"Can you stand and walk?\"    - MILD: Feels slightly dizzy, but walking normally.    - MODERATE: Feels very unsteady when walking, but not falling; interferes with normal activities (e.g., school, work) .    - SEVERE: Unable to walk without falling, or requires assistance to walk without falling; feels like passing out now.       mild  5. ONSET:  \"When did the dizziness begin?\"      today  6. AGGRAVATING FACTORS: \"Does anything make it worse?\" (e.g., standing, change in head position)      sitting  7. HEART RATE: \"Can you tell me your heart rate?\" \"How many beats in 15 seconds?\"  (Note: not all patients can do this)        N/a  8. CAUSE: \"What do you think is causing the dizziness?\"      Asking if diflucan can cause " "dizziness  10. OTHER SYMPTOMS: \"Do you have any other symptoms?\" (e.g., fever, chest pain, vomiting, diarrhea, bleeding)        denies  11. PREGNANCY: \"Is there any chance you are pregnant?\" \"When was your last menstrual period?\"        23w2d    Protocols used: Dizziness-ADULT-[place]    "

## 2024-06-22 ENCOUNTER — CLINICAL SUPPORT (OUTPATIENT)
Age: 24
End: 2024-06-22

## 2024-06-22 DIAGNOSIS — Z32.2 ENCOUNTER FOR CHILDBIRTH INSTRUCTION: Primary | ICD-10-CM

## 2024-06-25 ENCOUNTER — TELEPHONE (OUTPATIENT)
Age: 24
End: 2024-06-25

## 2024-06-25 NOTE — TELEPHONE ENCOUNTER
Spoke with patient regarding recommendations- asked for letters advised she can get one at her next visit

## 2024-06-25 NOTE — TELEPHONE ENCOUNTER
We would recommend limiting airplane travel by about 36 weeks as the risk for rupture of membranes increases after that time. However, some international airlines will not allow women to fly after 28 weeks. I would also encourage her to consider the medical care available where she is traveling to in case of  delivery.  She should also consider if she had a  delivery that the baby could be in the NICU until close to her due date, which would mean staying overseas.  Finally, she should get trip insurance for any travel in pregnancy and have a low threshold to cancel if any signs of  labor develop prior to leaving.

## 2024-06-25 NOTE — TELEPHONE ENCOUNTER
Pt would like to know if it would be safe/okay for her to travel out of the country when she is around 31-32 weeks pregnant.     Pt would like a follow up call back.

## 2024-06-25 NOTE — TELEPHONE ENCOUNTER
Patient is thinking about visiting home- [facility] - two connecting flights 7 hour and 1 hour flight. Will reach out to provider for recommendations on traveling at this GA.

## 2024-06-28 PROBLEM — Z3A.25 25 WEEKS GESTATION OF PREGNANCY: Status: ACTIVE | Noted: 2024-03-14

## 2024-06-28 NOTE — PATIENT INSTRUCTIONS
Patient Education     Pregnancy - The Sixth Month   About this topic   It is important for you to learn how to take care of yourself to help you have a healthy baby and safe delivery. It is good to have health care throughout your pregnancy.  The sixth month of your pregnancy starts around week 24 and lasts through week 28. By knowing how far along you are, you can learn what is normal for this stage of your pregnancy and plan for what is next.  General   Your body   During the sixth month of your pregnancy, here are some things you can expect.  You may:  Start to gain a little more weight. It is normal to gain about 10 to 15 pounds (4.5 to 7 kg) total in your first 6 months.  Have problems like back pain, dry eyes, or aching hands and wrists  Itching of palms, abdomen, or soles of your feet  Swelling of ankles, fingers, or face.  Need to urinate more frequently.  Have problems with hemorrhoids. Be sure to eat plenty of fresh fruits and vegetables to increase the fiber in your diet. Drink plenty of water to help keep your stools soft.  Continue having Dick Carbajal contractions. You may feel your belly squeezing or getting tight.  Have a glucose test to test for gestational diabetes.  Have more headaches. Talk to your doctor about how to help with them.  See stretch marks on your belly, breasts, or legs. Stay active to try and keep good muscle tone.  See an increase in vaginal discharge. Talk to your doctor about what to expect.  Your baby's growth and development:  Your baby looks like a very small  baby.  They are able to live outside of your womb but would need a lot of help breathing, eating, and staying warm in an intensive care unit.  Your baby has times of being awake and times of being asleep. The baby’s eyelids are able to open and close.  They move more and have hiccups.  Your baby is about 14 inches (36 cm) long and weighs about 2 pounds (900 gm). Your baby is about the size of an eggplant.  Baby  may be able to hear voices.  Things to Think About   Avoid alcohol, drugs, tobacco products, and second hand smoke  Eat small meals throughout the day instead of larger meals.  Avoid spicy, greasy, or fatty foods.  Avoid lying down or bending over for around 3 hours after eating  Warm baths are fine to help you relax. Avoid hot tubs while you are pregnant.  Avoid straining when having bowel movements.  Learning how to care for your baby. You may want to sign up for classes on how to take care of a .  Are you planning on going back to work after having your baby? It’s not too early to think about .  Consider starting your birth plan if you have specific needs or wishes for delivery.  When do I need to call the doctor?   Contractions every 10 minutes or more often that do not go away with drinking water or position changes  Low, dull back pain that does not go away  Pressure in your pelvis that feels like your baby is pushing down  A gush or constant trickle of watery or bloody fluid leaking from your vagina  Cramps in your lower belly that come and go or are constant  Change in your baby's movement  Fever of 100.4°F (38°C) or higher  Little to no movement felt by baby in 2 hours. Your baby should be moving at least 10 times every 2 hours.  Headache that does not go away; blurry vision; seeing spots or halos; increase in swelling in your hands, feet, or face; and pain under your ribs on the right side  Vaginal bleeding with or without pain  After a car accident, fall, or any trauma to your belly  Having thoughts of harming yourself or others, or do not feel safe at home  Last Reviewed Date   2020  Consumer Information Use and Disclaimer   This generalized information is a limited summary of diagnosis, treatment, and/or medication information. It is not meant to be comprehensive and should be used as a tool to help the user understand and/or assess potential diagnostic and treatment options. It does  NOT include all information about conditions, treatments, medications, side effects, or risks that may apply to a specific patient. It is not intended to be medical advice or a substitute for the medical advice, diagnosis, or treatment of a health care provider based on the health care provider's examination and assessment of a patient’s specific and unique circumstances. Patients must speak with a health care provider for complete information about their health, medical questions, and treatment options, including any risks or benefits regarding use of medications. This information does not endorse any treatments or medications as safe, effective, or approved for treating a specific patient. UpToDate, Inc. and its affiliates disclaim any warranty or liability relating to this information or the use thereof. The use of this information is governed by the Terms of Use, available at https://www.woltersTinychatuwer.com/en/know/clinical-effectiveness-terms   Copyright   Copyright © 2024 UpToDate, Inc. and its affiliates and/or licensors. All rights reserved.

## 2024-06-28 NOTE — ASSESSMENT & PLAN NOTE
Krysta Santos is a 24 y.o.  25w0d  TWG 4.536 kg (10 lb)  28 week labs ordered   Pt would like to go to PT for prenatal PT and lower back pain, referral placed   Leaving on vacation soon, flying to [place], travel recommendations reviewed to help prevent VTE   Feels well. Denies LOF/CTX/VB.   AFP completed Neg  Vaccines discussed    labor precautions reviewed.   Encouraged adequate hydration and nutrition  Pregnancy Essential guide and Baby and Me center web site recommended.

## 2024-07-02 ENCOUNTER — ROUTINE PRENATAL (OUTPATIENT)
Dept: OBGYN CLINIC | Facility: CLINIC | Age: 24
End: 2024-07-02
Payer: COMMERCIAL

## 2024-07-02 ENCOUNTER — CLINICAL SUPPORT (OUTPATIENT)
Age: 24
End: 2024-07-02

## 2024-07-02 VITALS
SYSTOLIC BLOOD PRESSURE: 120 MMHG | WEIGHT: 120 LBS | BODY MASS INDEX: 22.08 KG/M2 | DIASTOLIC BLOOD PRESSURE: 68 MMHG | HEIGHT: 62 IN

## 2024-07-02 DIAGNOSIS — Z34.02 ENCOUNTER FOR SUPERVISION OF NORMAL FIRST PREGNANCY IN SECOND TRIMESTER: Primary | ICD-10-CM

## 2024-07-02 DIAGNOSIS — Z3A.25 25 WEEKS GESTATION OF PREGNANCY: ICD-10-CM

## 2024-07-02 DIAGNOSIS — O99.891 BACK PAIN IN PREGNANCY: ICD-10-CM

## 2024-07-02 DIAGNOSIS — Z32.2 ENCOUNTER FOR CHILDBIRTH INSTRUCTION: Primary | ICD-10-CM

## 2024-07-02 DIAGNOSIS — M54.9 BACK PAIN IN PREGNANCY: ICD-10-CM

## 2024-07-02 LAB
SL AMB  POCT GLUCOSE, UA: NORMAL
SL AMB POCT URINE PROTEIN: NORMAL

## 2024-07-02 PROCEDURE — 81002 URINALYSIS NONAUTO W/O SCOPE: CPT | Performed by: OBSTETRICS & GYNECOLOGY

## 2024-07-02 PROCEDURE — 99213 OFFICE O/P EST LOW 20 MIN: CPT | Performed by: OBSTETRICS & GYNECOLOGY

## 2024-07-02 NOTE — PROGRESS NOTES
25w0d  Pap 04/10/2024.Neg   GC/CT:4/10/2023 Neg / Neg   PN1 Labs: 4/15/2024  Blood Type: A  Negative Needs  Rhogam @ 28 weeks   MFM Level 1:2024  MFM Level 2:2024  AFP: 5/3/2024  28 Week Labs: given script today to be completed in 2 weeks .  TDap:  Flu:  GBS:   Blue Folder: given   Yellow Folder:  Ped Referral : given on 2024  Breast pump: order today 2024  L&D forms:  Delivery consent:      Patient reports positive fetal movements with no concerns .  Patient reports that sh eis doing very well with the pregnancy so far .

## 2024-07-02 NOTE — PROGRESS NOTES
Problem   25 Weeks Gestation of Pregnancy    Blood Type: A negative.   Pap neg   GC/CT -  neg  PN1 Labs- nml, GBS bacteruria +, Thrombophilia panel ordered-not done yet  28 Week Labs-    Blue folder- reviewed at intake  Yellow folder-    Genetic screening-   AFP- neg  Level 1- 24  Level 2-     Yellow folder-  TDAP -   Rhogam -   Delivery consent-  Breast pump -   Pediatrician -    Perineal massage -  GBS swab -   IOL -       25 weeks gestation of pregnancy  Krysta Santos is a 24 y.o.  25w0d  TWG 4.536 kg (10 lb)  28 week labs ordered   Pt would like to go to PT for prenatal PT and lower back pain, referral placed   Leaving on vacation soon, flying to [place], travel recommendations reviewed to help prevent VTE   Feels well. Denies LOF/CTX/VB.   AFP completed Neg  Vaccines discussed    labor precautions reviewed.   Encouraged adequate hydration and nutrition  Pregnancy Essential guide and Baby and Me center web site recommended.

## 2024-07-03 LAB
DME PARACHUTE DELIVERY DATE REQUESTED: NORMAL
DME PARACHUTE ITEM DESCRIPTION: NORMAL
DME PARACHUTE ORDER STATUS: NORMAL
DME PARACHUTE SUPPLIER NAME: NORMAL
DME PARACHUTE SUPPLIER PHONE: NORMAL

## 2024-07-23 ENCOUNTER — APPOINTMENT (OUTPATIENT)
Age: 24
End: 2024-07-23
Payer: COMMERCIAL

## 2024-07-23 ENCOUNTER — ROUTINE PRENATAL (OUTPATIENT)
Age: 24
End: 2024-07-23
Payer: COMMERCIAL

## 2024-07-23 VITALS
SYSTOLIC BLOOD PRESSURE: 84 MMHG | WEIGHT: 123 LBS | HEIGHT: 62 IN | DIASTOLIC BLOOD PRESSURE: 42 MMHG | BODY MASS INDEX: 22.63 KG/M2 | HEART RATE: 75 BPM

## 2024-07-23 DIAGNOSIS — O26.899 RH NEGATIVE STATE IN ANTEPARTUM PERIOD: ICD-10-CM

## 2024-07-23 DIAGNOSIS — Z3A.28 28 WEEKS GESTATION OF PREGNANCY: ICD-10-CM

## 2024-07-23 DIAGNOSIS — Z34.02 ENCOUNTER FOR SUPERVISION OF NORMAL FIRST PREGNANCY IN SECOND TRIMESTER: ICD-10-CM

## 2024-07-23 DIAGNOSIS — Z67.91 RH NEGATIVE STATE IN ANTEPARTUM PERIOD: ICD-10-CM

## 2024-07-23 DIAGNOSIS — Z23 NEED FOR TDAP VACCINATION: ICD-10-CM

## 2024-07-23 DIAGNOSIS — Z34.02 ENCOUNTER FOR SUPERVISION OF NORMAL FIRST PREGNANCY IN SECOND TRIMESTER: Primary | ICD-10-CM

## 2024-07-23 LAB
BASOPHILS # BLD AUTO: 0.03 THOUSANDS/ÂΜL (ref 0–0.1)
BASOPHILS NFR BLD AUTO: 0 % (ref 0–1)
EOSINOPHIL # BLD AUTO: 0.15 THOUSAND/ÂΜL (ref 0–0.61)
EOSINOPHIL NFR BLD AUTO: 2 % (ref 0–6)
ERYTHROCYTE [DISTWIDTH] IN BLOOD BY AUTOMATED COUNT: 12.2 % (ref 11.6–15.1)
GLUCOSE 1H P 50 G GLC PO SERPL-MCNC: 165 MG/DL (ref 70–134)
HCT VFR BLD AUTO: 32.6 % (ref 34.8–46.1)
HGB BLD-MCNC: 10.7 G/DL (ref 11.5–15.4)
IMM GRANULOCYTES # BLD AUTO: 0.05 THOUSAND/UL (ref 0–0.2)
IMM GRANULOCYTES NFR BLD AUTO: 1 % (ref 0–2)
LYMPHOCYTES # BLD AUTO: 0.91 THOUSANDS/ÂΜL (ref 0.6–4.47)
LYMPHOCYTES NFR BLD AUTO: 11 % (ref 14–44)
MCH RBC QN AUTO: 30.4 PG (ref 26.8–34.3)
MCHC RBC AUTO-ENTMCNC: 32.8 G/DL (ref 31.4–37.4)
MCV RBC AUTO: 93 FL (ref 82–98)
MONOCYTES # BLD AUTO: 0.5 THOUSAND/ÂΜL (ref 0.17–1.22)
MONOCYTES NFR BLD AUTO: 6 % (ref 4–12)
NEUTROPHILS # BLD AUTO: 6.7 THOUSANDS/ÂΜL (ref 1.85–7.62)
NEUTS SEG NFR BLD AUTO: 80 % (ref 43–75)
NRBC BLD AUTO-RTO: 0 /100 WBCS
PLATELET # BLD AUTO: 190 THOUSANDS/UL (ref 149–390)
PMV BLD AUTO: 11.2 FL (ref 8.9–12.7)
RBC # BLD AUTO: 3.52 MILLION/UL (ref 3.81–5.12)
SL AMB  POCT GLUCOSE, UA: POSITIVE
SL AMB POCT URINE PROTEIN: NEGATIVE
TREPONEMA PALLIDUM IGG+IGM AB [PRESENCE] IN SERUM OR PLASMA BY IMMUNOASSAY: NORMAL
WBC # BLD AUTO: 8.34 THOUSAND/UL (ref 4.31–10.16)

## 2024-07-23 PROCEDURE — 90471 IMMUNIZATION ADMIN: CPT | Performed by: STUDENT IN AN ORGANIZED HEALTH CARE EDUCATION/TRAINING PROGRAM

## 2024-07-23 PROCEDURE — 81002 URINALYSIS NONAUTO W/O SCOPE: CPT | Performed by: STUDENT IN AN ORGANIZED HEALTH CARE EDUCATION/TRAINING PROGRAM

## 2024-07-23 PROCEDURE — 85025 COMPLETE CBC W/AUTO DIFF WBC: CPT

## 2024-07-23 PROCEDURE — 96372 THER/PROPH/DIAG INJ SC/IM: CPT | Performed by: STUDENT IN AN ORGANIZED HEALTH CARE EDUCATION/TRAINING PROGRAM

## 2024-07-23 PROCEDURE — 82950 GLUCOSE TEST: CPT

## 2024-07-23 PROCEDURE — 86780 TREPONEMA PALLIDUM: CPT

## 2024-07-23 PROCEDURE — 36415 COLL VENOUS BLD VENIPUNCTURE: CPT

## 2024-07-23 PROCEDURE — 90715 TDAP VACCINE 7 YRS/> IM: CPT | Performed by: STUDENT IN AN ORGANIZED HEALTH CARE EDUCATION/TRAINING PROGRAM

## 2024-07-23 PROCEDURE — 99214 OFFICE O/P EST MOD 30 MIN: CPT | Performed by: STUDENT IN AN ORGANIZED HEALTH CARE EDUCATION/TRAINING PROGRAM

## 2024-07-23 NOTE — ASSESSMENT & PLAN NOTE
-precautions reviewed  -s/p Tdap/Rhogam today  -delivery consent signed today  -prepregnancy BMI 20 with goal weight gain 15-25#: TWG = 13#  -reviewed recommendations followed by ROBIN for cervical length screening, that CL = 4cm would be considered normal. Do not recommend continued serial cervical length screening, but can discuss further with [facility] if she would like   -+2 glucose on urine this AM, however she recently did 1hr gtt

## 2024-07-23 NOTE — PROGRESS NOTES
Pt is here for routine ob visit   No concerns at this time  Urine neg protein/ +2 glucose  No LOF,VB,Contractions   +FM   28wk Labs completed before visit, pending results   Tdap and Rhogam given today/pt tolerated well   Yellow folder given/reviewed w/ pt  Delivery Consent signed today   Breast Pump already has   Peds Referral placed

## 2024-07-23 NOTE — PROGRESS NOTES
24 y.o.  at 28w0d, here for routine OB visit. Feeling well overall and without concerns. Good FM. Denies LOF, VB, contractions. No problems with activity. Was seen in [facility] for a prenatal visit recently, while travelling, and cervical length was measured. She is concerned that she was told CL measurement was standard at every OB visit there. She is also concerned, as she was told that her CL was 4cm and should be more than this. She wonders if she should have serial CL screening.     She just did 28 wk labs this AM     Problem List Items Addressed This Visit          Blood    Rh negative state in antepartum period    Relevant Medications    Rho(D) immune globulin (RHOGAM ULTRA-FILTERED PLUS) IM injection 300 mcg (Start on 2024  9:30 AM)       Obstetrics/Gynecology    Encounter for supervision of normal first pregnancy in second trimester - Primary    Relevant Orders    POCT urine dip    Ambulatory referral to Pediatrics    28 weeks gestation of pregnancy     -precautions reviewed  -s/p Tdap/Rhogam today  -delivery consent signed today  -prepregnancy BMI 20 with goal weight gain 15-25#: TWG = 13#  -reviewed recommendations followed by ROBIN for cervical length screening, that CL = 4cm would be considered normal. Do not recommend continued serial cervical length screening, but can discuss further with [facility] if she would like   -+2 glucose on urine this AM, however she recently did 1hr gtt          Other Visit Diagnoses       Need for Tdap vaccination        Relevant Orders    TDAP VACCINE GREATER THAN OR EQUAL TO 6YO IM

## 2024-07-24 DIAGNOSIS — O99.810 ABNORMAL GLUCOSE AFFECTING PREGNANCY: Primary | ICD-10-CM

## 2024-07-26 ENCOUNTER — NURSE TRIAGE (OUTPATIENT)
Age: 24
End: 2024-07-26

## 2024-07-26 NOTE — TELEPHONE ENCOUNTER
"Pt seen at [facility] yesterday and diagnosed with Covid. Asking for care advice in pregnancy. RN provided advice - rest, hydration, can take Tylenol if having a fever/body aches/chills. Advised go to ED if experiencing fever >103 with Tylenol on board, SOB or chest pain. Otherwise, treat at home with supportive therapies. Pt asking if she should wait to go do her 3 hour fasting glucose test. RN advised yes, wait and rest through the weekend, obtain 3 hour glucose test next week. Pt verbalized an understanding.   Reason for Disposition  • Information only question and nurse able to answer    Answer Assessment - Initial Assessment Questions  1. REASON FOR CALL or QUESTION: \"What is your reason for calling today?\" or \"How can I best help you?\" or \"What question do you have that I can help answer?\"      Questions about COVID in pregnancy.    Protocols used: Information Only Call - No Triage-ADULT-[place]    "

## 2024-07-29 ENCOUNTER — NURSE TRIAGE (OUTPATIENT)
Age: 24
End: 2024-07-29

## 2024-07-29 DIAGNOSIS — O98.513 COVID-19 AFFECTING PREGNANCY IN THIRD TRIMESTER: Primary | ICD-10-CM

## 2024-07-29 DIAGNOSIS — U07.1 COVID-19 AFFECTING PREGNANCY IN THIRD TRIMESTER: Primary | ICD-10-CM

## 2024-07-29 RX ORDER — NIRMATRELVIR AND RITONAVIR 300-100 MG
3 KIT ORAL 2 TIMES DAILY
Qty: 30 TABLET | Refills: 0 | Status: SHIPPED | OUTPATIENT
Start: 2024-07-29 | End: 2024-08-03

## 2024-07-29 NOTE — TELEPHONE ENCOUNTER
"Patient 28 weeks 6 days pregnant called office stating she was diagnosed with Covid on 7/25. Symptoms include congestion and cough that is worsening. She explains she called PCP regarding cough and was advised to call ObGyn for further care advice. She states she does not feel short of breath or have chest pain, however she feels some wheezing and pressure in her trachea. She is fatigued and unable to do much around her house. She had a fever that has subsided. She is currently taking tylenol as needed.  She has positive fetal movement, denies contractions, vaginal bleeding, loss of fluid. Advised her to call back if SOB, chest pain, dec. FM, loss of fluid, vag. Bleeding. Routed to provider for further advice at this time.   ESC message to Dr. Brooke.   Response:   \"Okay recommend symptom control. She is eligible for normal Covid treatment with paxlovid or other things her pcp would consider in a non pregnant patient. I am ok to prescribe paxlovid but other management would be deferred to her pcp \"  \"It's up to her. If she managing okay would keep doing what she's doing. If she's feeling worse I can prescribe it. Can also consider uc or er if pcp is not being helpful \"  \"Sent. If breathing worsens would go to ER. She should double check her pharmacy stocks or fills this before she sends someone \"  CAlled patient - Gave advice per Dr. Brooke. Patient agreed to Paxlovid. Patient offered questions regarding covid illness and pregnancy and length of illness and fevers. Dr. Brooke will reach out to patient via phone at this time.         Answer Assessment - Initial Assessment Questions  1. ONSET: \"When did the nasal discharge start?\"       Wednesday 7/24  2. AMOUNT: \"How much discharge is there?\"       Yellow moderate amount   3. COUGH: \"Do you have a cough?\" If yes, ask: \"Describe the color of your sputum\" (clear, white, yellow, green)      Yes - unknown   4. RESPIRATORY DISTRESS: \"Describe your breathing.\"       " "Harder to breath, but not short of breath   5. FEVER: \"Do you have a fever?\" If Yes, ask: \"What is your temperature, how was it measured, and when did it start?\"      NO   6. SEVERITY: \"Overall, how bad are you feeling right now?\" (e.g., doesn't interfere with normal activities, staying home from school/work, staying in bed)       Able to move around a little, but can't do anything around the house.   7. OTHER SYMPTOMS: \"Do you have any other symptoms?\" (e.g., sore throat, earache, wheezing, vomiting)      Sore throat, some wheezing, pressure in trachea  8. PREGNANCY: \"Is there any chance you are pregnant?\" \"When was your last menstrual period?\"      28 weeks 6 days pregnant.    Protocols used: Common Cold-ADULT-[place]    "

## 2024-07-29 NOTE — TELEPHONE ENCOUNTER
Called patient, answered questions but had to go for delivery with questions pending, called back again and no answer.

## 2024-07-30 ENCOUNTER — CLINICAL SUPPORT (OUTPATIENT)
Age: 24
End: 2024-07-30

## 2024-07-30 ENCOUNTER — TELEPHONE (OUTPATIENT)
Dept: POSTPARTUM | Facility: CLINIC | Age: 24
End: 2024-07-30

## 2024-07-30 DIAGNOSIS — Z32.2 ENCOUNTER FOR CHILDBIRTH INSTRUCTION: Primary | ICD-10-CM

## 2024-07-30 NOTE — TELEPHONE ENCOUNTER
Attempted to contact patient in regards to clarifying a registration question for a prenatal class tonight 7/30/24. No answer. Left message requesting that patient call our office back at [phone].

## 2024-08-01 ENCOUNTER — APPOINTMENT (OUTPATIENT)
Dept: LAB | Facility: HOSPITAL | Age: 24
End: 2024-08-01
Payer: COMMERCIAL

## 2024-08-01 DIAGNOSIS — O99.810 ABNORMAL GLUCOSE AFFECTING PREGNANCY: Primary | ICD-10-CM

## 2024-08-01 DIAGNOSIS — O99.810 ABNORMAL GLUCOSE AFFECTING PREGNANCY: ICD-10-CM

## 2024-08-01 DIAGNOSIS — K21.00 GASTROESOPHAGEAL REFLUX DISEASE WITH ESOPHAGITIS WITHOUT HEMORRHAGE: ICD-10-CM

## 2024-08-01 LAB
GLUCOSE 1H P 100 G GLC PO SERPL-MCNC: 192 MG/DL (ref 65–179)
GLUCOSE 2H P 100 G GLC PO SERPL-MCNC: 167 MG/DL (ref 65–154)
GLUCOSE 3H P 100 G GLC PO SERPL-MCNC: 125 MG/DL (ref 65–139)
GLUCOSE P FAST SERPL-MCNC: 80 MG/DL (ref 65–94)

## 2024-08-01 PROCEDURE — 82952 GTT-ADDED SAMPLES: CPT

## 2024-08-01 PROCEDURE — 36415 COLL VENOUS BLD VENIPUNCTURE: CPT

## 2024-08-01 PROCEDURE — 82951 GLUCOSE TOLERANCE TEST (GTT): CPT

## 2024-08-01 RX ORDER — PANTOPRAZOLE SODIUM 40 MG/1
40 TABLET, DELAYED RELEASE ORAL DAILY
Qty: 30 TABLET | Refills: 5 | Status: SHIPPED | OUTPATIENT
Start: 2024-08-01

## 2024-08-01 NOTE — PROGRESS NOTES
Abnormal 3-hour glucose.  Referral to maternal-fetal medicine for diabetic education placed.  Patient sent a message via [employer]

## 2024-08-02 ENCOUNTER — EVALUATION (OUTPATIENT)
Dept: PHYSICAL THERAPY | Facility: CLINIC | Age: 24
End: 2024-08-02
Payer: COMMERCIAL

## 2024-08-02 DIAGNOSIS — O99.891 BACK PAIN IN PREGNANCY: ICD-10-CM

## 2024-08-02 DIAGNOSIS — M54.9 BACK PAIN IN PREGNANCY: ICD-10-CM

## 2024-08-02 PROCEDURE — 97161 PT EVAL LOW COMPLEX 20 MIN: CPT

## 2024-08-02 PROCEDURE — 97530 THERAPEUTIC ACTIVITIES: CPT

## 2024-08-02 NOTE — PROGRESS NOTES
PT Evaluation     Today's date: 2024  Patient name: Krysta Santos  : 2000  MRN: [id number]  Referring provider: Rama Marrero  Dx:   Encounter Diagnosis     ICD-10-CM    1. Back pain in pregnancy  O99.891 Ambulatory referral to Physical Therapy    M54.9           Start Time: 0730  Stop Time: 0820  Total time in clinic (min): 50 minutes    Assessment  Impairments: activity intolerance, impaired physical strength, lacks appropriate home exercise program, pain with function, participation limitations and activity limitations  Symptom irritability: moderate    Assessment details: Pt present to PT at 29 weeks gestation w. C/O LBP that radiates into the posterior L thigh. Pt had good strength and control over PF during exam w. Minimal pinpoint tenderness in L OI. Pt TTP to origin of L gluteus samm and piriformis w/ pain into the L SIJ. She has some over activity in the lumbar stabilizers during bending and lifting requiring cueing for core contraction. 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 Pt would benefit from skilled PT in order to address the above deficits and prepare for optimal labor and delivery.     Pelvic floor anatomy was explained to patient utilizing a pelvic model and examination technique was discussed, including all risks and precautions. Patient provided verbal and written consent for internal pelvic floor muscle assessment prior to examination.     Understanding of Dx/Px/POC: good    Goals  STG: to be completed in 3 weeks   1.Pt will have good understanding and compliance of HEP.   2. Pt will show appropriate co-contraction of TvA w/ PF during lifting from floor to waist.     LTG: to be met by d/c   1. Pt will be able to bend and lift 25# w/o pain.   2. Pt will have good knowledge of different positions to open pelvic girdle during labor.   3. Pt will have good understand and adherence  "to perineal massage to prevent tearing.       Plan  Patient would benefit from: skilled physical therapy    Planned therapy interventions: joint mobilization, manual therapy, therapeutic activities and neuromuscular re-education    Frequency: 1x week  Duration in weeks: 8  Plan of Care beginning date: 2024  Plan of Care expiration date: 2024  Treatment plan discussed with: patient        PT Pelvic Floor Subjective:   History of Present Illness:   Currently 29 weeks gestation. LBP w/ c/o sciatic pain to the thigh.     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   Diet and Exercise:      Exercise type: walking    half an hour a day.  OB/ gyn History    Gestational History:     Prior Pregnancy: No    Bladder Function:     Voiding Difficulties positive for: frequent urination       Voiding Difficulties comments:     Voiding frequency: every 1-2 hours    Urinary leakage: no urine leakage    Nocturia (episodes per night): 4 or more    Intake (ounces): Water: 100,   Bowel Function:     Voiding DIfficulties: constipation    Sexual Function:     Sexually Active:  Sexually active    Pain during intercourse: No    Pain:     Current pain ratin    At best pain ratin    At worst pain ratin    Location:  LBP/ L sided radiating into thigh    Onset:  Less than 1 month ago    Quality:  Radiating and throbbing    Aggravating factors:  Prolonged positions and walking (transfers from floor to standing.)    Pain duration: relief upon relaxation.    Relieving factors:  Relaxation  Patient Goals:     Patient goals for therapy:  Improved pain management    Other patient goals:  \"Survive this pregnncy and prepare for labor\"      Objective     General Comments:      Hip Comments   TTP to L SIJ and origin of Glute Max.       Abdominal Assessment:      Abdominal Assessment: Slight belly button herniation during abdominal contraction.     Visual Inspection of Perineum:   Involuntary contraction with " coughing: yes  Involuntary relaxation with bearing down: no        Pelvic Floor Muscle Exam:     Muscle Contraction: well isolated          PERFECT Score   Power right: 4/5   Power left: 4/5   Endurance (seconds to max): 6    Fast flicks (in 10 seconds): 7   Perfect Score: Slight TTP to L OI.     TONE: normal.       pelvic floor exam consent given by patient    Pelvic exam completed: vaginally                Precautions: currently pregnant.   Patient Active Problem List   Diagnosis    28 weeks gestation of pregnancy    Nausea/vomiting in pregnancy    Group B streptococcal bacteriuria    Rubella non-immune status, antepartum    Rh negative state in antepartum period    Encounter for supervision of normal first pregnancy in second trimester         PRO EVAL RE-EVAL DISCHARGE   PFDI      CHANCE-18      VPQ      CPSI-NIH      PGQ NV         POC Expires Auth Status Start Date Exp Date PT Visit Limit Unit limit DA provider   9/27 8/2  BOMN BOMN          Date of Service 8/2           Visits Used            Visits Remaining                        Manuals                                                            Neuro Re-Ed                                                                                                Ther Ex            Side plank clam 3x10 BlTB           Seated abd w/ hinge 3x10 BluTB                                                           Ther Activity            Education Anatomy and POC            education Urge supression           Pelvic Girdle [facility]  NV                                                          Modalities

## 2024-08-06 ENCOUNTER — DOCUMENTATION (OUTPATIENT)
Dept: PERINATAL CARE | Facility: CLINIC | Age: 24
End: 2024-08-06

## 2024-08-06 ENCOUNTER — PATIENT MESSAGE (OUTPATIENT)
Dept: PERINATAL CARE | Facility: CLINIC | Age: 24
End: 2024-08-06

## 2024-08-07 ENCOUNTER — PATIENT MESSAGE (OUTPATIENT)
Age: 24
End: 2024-08-07

## 2024-08-07 ENCOUNTER — DOCUMENTATION (OUTPATIENT)
Dept: PERINATAL CARE | Facility: CLINIC | Age: 24
End: 2024-08-07

## 2024-08-07 ENCOUNTER — TELEMEDICINE (OUTPATIENT)
Dept: PERINATAL CARE | Facility: CLINIC | Age: 24
End: 2024-08-07
Payer: COMMERCIAL

## 2024-08-07 ENCOUNTER — ROUTINE PRENATAL (OUTPATIENT)
Age: 24
End: 2024-08-07
Payer: COMMERCIAL

## 2024-08-07 VITALS
SYSTOLIC BLOOD PRESSURE: 114 MMHG | DIASTOLIC BLOOD PRESSURE: 78 MMHG | OXYGEN SATURATION: 98 % | BODY MASS INDEX: 21.95 KG/M2 | WEIGHT: 120 LBS | HEART RATE: 89 BPM

## 2024-08-07 DIAGNOSIS — Z3A.30 30 WEEKS GESTATION OF PREGNANCY: ICD-10-CM

## 2024-08-07 DIAGNOSIS — O24.410 DIET CONTROLLED GESTATIONAL DIABETES MELLITUS (GDM) IN THIRD TRIMESTER: Primary | ICD-10-CM

## 2024-08-07 DIAGNOSIS — O24.410 DIET CONTROLLED GESTATIONAL DIABETES MELLITUS IN THIRD TRIMESTER: Primary | ICD-10-CM

## 2024-08-07 DIAGNOSIS — O24.410 DIET CONTROLLED GESTATIONAL DIABETES MELLITUS (GDM) IN THIRD TRIMESTER: ICD-10-CM

## 2024-08-07 DIAGNOSIS — O99.810 ABNORMAL GLUCOSE AFFECTING PREGNANCY: ICD-10-CM

## 2024-08-07 DIAGNOSIS — O26.899 RH NEGATIVE STATE IN ANTEPARTUM PERIOD: ICD-10-CM

## 2024-08-07 DIAGNOSIS — Z67.91 RH NEGATIVE STATE IN ANTEPARTUM PERIOD: ICD-10-CM

## 2024-08-07 DIAGNOSIS — Z3A.30 30 WEEKS GESTATION OF PREGNANCY: Primary | ICD-10-CM

## 2024-08-07 DIAGNOSIS — O99.013 ANEMIA DURING PREGNANCY IN THIRD TRIMESTER: ICD-10-CM

## 2024-08-07 LAB
SL AMB  POCT GLUCOSE, UA: NORMAL
SL AMB POCT URINE PROTEIN: NORMAL

## 2024-08-07 PROCEDURE — G0109 DIAB MANAGE TRN IND/GROUP: HCPCS | Performed by: DIETITIAN, REGISTERED

## 2024-08-07 PROCEDURE — 99213 OFFICE O/P EST LOW 20 MIN: CPT | Performed by: OBSTETRICS & GYNECOLOGY

## 2024-08-07 RX ORDER — BLOOD-GLUCOSE METER
EACH MISCELLANEOUS
Qty: 1 KIT | Refills: 0 | Status: SHIPPED | OUTPATIENT
Start: 2024-08-07 | End: 2024-08-12

## 2024-08-07 RX ORDER — FERROUS SULFATE 324(65)MG
324 TABLET, DELAYED RELEASE (ENTERIC COATED) ORAL
Qty: 30 TABLET | Refills: 3 | Status: SHIPPED | OUTPATIENT
Start: 2024-08-07

## 2024-08-07 RX ORDER — LANCETS
EACH MISCELLANEOUS
Qty: 100 EACH | Refills: 4 | Status: SHIPPED | OUTPATIENT
Start: 2024-08-07 | End: 2024-10-15

## 2024-08-07 NOTE — LETTER
[facility]  [address]  [facility] [address]  [phone]  Dept: [phone]    August 28, 2024    Patient: Krysta Santos  YOB: 2000    Krysta Santos is currently under my care.     Sincerely,    Abbey Beltrán DO

## 2024-08-07 NOTE — LETTER
August 7, 2024     Patient: Krysta Santos  YOB: 2000  Date of Visit: 8/7/2024      To Whom it May Concern:    Krysta Santos is under my professional care for her pregnancy, she has been compliant with her care and appointments. Krysta has a due date of 10/15/2024.     If you have any questions or concerns, please don't hesitate to call.         Sincerely,          Abbey Beltrán DO        CC: No Recipients

## 2024-08-07 NOTE — PROGRESS NOTES
"    Thank you for referring your patient to [facility] Maternal [facility]etal [facility] Diabetes in Pregnancy Program.     Krysta Santos is a  24 y.o. female who presents today for Group  Class 1.  Patient is at 30w1d gestation, Estimated Date of Delivery: 10/15/24.     Reviewed and updated the following from patients medical record: PMH, Problem List, Allergies, and Current Medications.    Visit Diagnosis:  Diet controlled GDM    Discussed with patient pathophysiology of GDM, untreated hyperglycemia in pregnancy and maternal fetal complications including fetal macrosomia,  hypoglycemia, polyhydramnios, increased incidence of  section,  labor, and in severe cases fetal demise and still birth . Discussed importance of blood glucose monitoring, nutrition, and medication if necessary in achieving BG goals.     Additional Pregnancy Complications:  None    Labs:    Lab Results   Component Value Date    SQY0DXJW05UZ 165 (H) 2024       Lab Results   Component Value Date    GLUF 80 2024    DYNUNXE4XW 192 (H) 2024    HFYSCJE0IU 167 (H) 2024    GOZDCYF7CK 125 2024 SbA2b-9.7%    Medications:  No diabetes related medications    Anthropometrics:  Ht Readings from Last 3 Encounters:   24 5' 2\" (1.575 m)   24 5' 2\" (1.575 m)   24 5' 2\" (1.575 m)     Wt Readings from Last 3 Encounters:   24 54.4 kg (120 lb)   24 55.8 kg (123 lb)   24 54.4 kg (120 lb)     Pre-gravid weight: 49.9 kg (110 lb)  Pre-gravid BMI: 20.11  Weight Change: 4.536 kg (10 lb)  Weight gain recommendations: BMI (18.5-24.9) 25-35 lbs  Comments: May gain 25 more pounds for the remainder of the pregnancy    Recent Ultra Sound Results:  Date: 24 echo  Fetal Growth: Normal  SVETA: Normal  Next US date: to be scheduled    Blood Glucose Monitoring:   Glucose Meter: Contour Next EZ  Instructed on testing blood sugars: 4 x per day (Fasting, 2 hour after start of each " meal)    Gave instruction on site selection, skin preparation, loading strips and lancet device, meter activation, obtaining blood sample, test strip and lancet disposal and storage, and recording log book entries. Patient has good understanding of material covered and was able to test their own blood sugar in office today.     Instruction for reporting blood sugar results weekly via:  Phone: [phone]   OR  My Chart (Message with image attachment, or Glucose Flowsheet)    Goal Blood Sugar Ranges:   Fastin-90 mg/dL  1 hour after the start of each meal: 140 mg/dL or less  2 hours after start of each meal: 120 mg/dL or less    Meal Plan (daily calorie and protein needs):  Calories: 1800 calorie (CHO: 45-15-45-15-45-30) (PRO:2-1-3-1-3-2)    Type of Diet:Regular  Additional Nutrition Concerns: Allergy--Dairy Products & soy milk. Concerned she may lose witght on her meals plan. Agreed to try the meal plan for 1 week to determine if needs more calories to avoid weight loss. .     Meal Plan Tips:  1. Patient was provided with a meal plan including 3 meals and 3 snacks.  2. Discussed appropriate amounts of CHO, PRO, and Fat at each meal and snack.   3. Reviewed CHO exchange list, and portion sizes for both CHO and PRO via food models  4. Instruction on how to read a food label. Need to review hoew to resad a food label at class 2  5. Provided suggested meal/snack options to increase nutrition and maintain consistent meal and snack intakes.  6. Instructed on how to keep a 3-day food diary to be brought to follow- up appointment.   7. Encouraged  patient to eat every 2.0-3.5 hours while awake  8. Encouraged patient to go no longer than 8-10 hours fasting overnight until first meal of the day.      Physical Activity:  Discussed benefits of physical activity to optimize blood glucose control, encouraged activity at patient is physically able. Always consult a physician prior to starting an exercise program. Recommend  "20-30 minutes daily.    Patient Stated Goal: \"I will plan my meals and snacks every day\"    Diabetes Self Management Support Plan outside of ongoing care: Spouse/Family    Learner/s Present:Learners Present: Patient   Barriers to Learning/Change: Financial  Expected Compliance: good    Date to report blood sugars: Monday, 24  Class 2 (date): Tuesday, 24    Begin Time: 8:15 AM  End Time: 9:20 AM    It was a pleasure working with them today. Please feel free to call with any questions or concerns.    Eliza Arce, MS, RS, [facility] [place]  Diabetes Educator  [facility] Maternal Fetal Medicine  Diabetes in Pregnancy Program  [address], [address]  [place] [address]         Virtual Regular Visit  Name: Krysta Santos      : 2000      MRN: [id number]  Encounter Provider: Eliza Arce  Encounter Date: 2024   Encounter department: [facility]    Verification of patient location: [place], [place]    Patient is located at Home in the following state in which I hold an active license [place]    Assessment & Plan   1. Diet controlled gestational diabetes mellitus (GDM) in third trimester  2. 30 weeks gestation of pregnancy  3. Abnormal glucose affecting pregnancy  -     Ambulatory Referral to Maternal Fetal Medicine        Encounter provider Eliza Arce    The patient was identified by name and date of birth. Krysta Santos was informed that this is a telemedicine visit and that the visit is being conducted through the P21 platform. She agrees to proceed..  My office door was closed. No one else was in the room.  She acknowledged consent and understanding of privacy and security of the video platform. The patient has agreed to participate and understands they can discontinue the visit at any time.    Patient is aware this is a billable service.     History of Present Illness     Krysta Santos is a 24 y.o. female who presents with pregnancy    Review of " Systems    Objective     LMP 01/09/2024 (Exact Date)   Physical Exam--not performed    Visit Time  Total Visit Duration: 60 minutes

## 2024-08-07 NOTE — ASSESSMENT & PLAN NOTE
Pt doing well today, no complaints  +FM. Denies ctx, vb and lof.   Up to date on care  Diagnosed with GDM recently- had consultation with dietician/diabetes education this morning. Reviewed diagnosis with patient at visit today  Precautions reviewed, f/u in 2 weeks

## 2024-08-07 NOTE — PROGRESS NOTES
Problem List Items Addressed This Visit       30 weeks gestation of pregnancy - Primary     Pt doing well today, no complaints  +FM. Denies ctx, vb and lof.   Up to date on care  Diagnosed with GDM recently- had consultation with dietician/diabetes education this morning. Reviewed diagnosis with patient at visit today  Precautions reviewed, f/u in 2 weeks             Relevant Orders    POCT urine dip (Completed)    Rh negative state in antepartum period     Sp rhogam at 28w         Diet controlled gestational diabetes mellitus (GDM) in third trimester     Had consultation today with M. Had teaching and has supplies    Lab Results   Component Value Date    HGBA1C 4.7 2024            Anemia during pregnancy in third trimester     Hb 9.9 on   Oral fe started  Rpt cbc in 4 weeks         Relevant Medications    ferrous sulfate 324 (65 Fe) mg       Krysta Santos is a 24 y.o.  at 30w1d who presents today for routine prenatal visit.     /78 (BP Location: Left arm, Patient Position: Sitting, Cuff Size: Standard)   Pulse 89   Wt 54.4 kg (120 lb)   LMP 2024 (Exact Date)   SpO2 98%   BMI 21.95 kg/m²       FH 29 cm

## 2024-08-07 NOTE — LETTER
08/28/24    Krysta Snatos    2000    Estimated Date of Delivery: 10/15/24      The above named patient is currently under our care for their pregnancy.          Thank you,      Abbey Beltrán DO

## 2024-08-07 NOTE — ASSESSMENT & PLAN NOTE
Had consultation today with [facility]. Had teaching and has supplies    Lab Results   Component Value Date    HGBA1C 4.7 06/14/2024

## 2024-08-08 ENCOUNTER — APPOINTMENT (OUTPATIENT)
Dept: PHYSICAL THERAPY | Facility: CLINIC | Age: 24
End: 2024-08-08
Payer: COMMERCIAL

## 2024-08-09 DIAGNOSIS — O24.410 DIET CONTROLLED GESTATIONAL DIABETES MELLITUS IN THIRD TRIMESTER: ICD-10-CM

## 2024-08-10 ENCOUNTER — CLINICAL SUPPORT (OUTPATIENT)
Dept: POSTPARTUM | Facility: CLINIC | Age: 24
End: 2024-08-10

## 2024-08-10 DIAGNOSIS — Z32.2 ENCOUNTER FOR CHILDBIRTH INSTRUCTION: Primary | ICD-10-CM

## 2024-08-12 RX ORDER — IBUPROFEN 100 MG/5ML
1 SUSPENSION ORAL 4 TIMES DAILY PRN
Qty: 1 KIT | Refills: 0 | Status: SHIPPED | OUTPATIENT
Start: 2024-08-12

## 2024-08-13 ENCOUNTER — TELEPHONE (OUTPATIENT)
Dept: PERINATAL CARE | Facility: CLINIC | Age: 24
End: 2024-08-13

## 2024-08-13 ENCOUNTER — TELEPHONE (OUTPATIENT)
Facility: HOSPITAL | Age: 24
End: 2024-08-13

## 2024-08-13 ENCOUNTER — TELEPHONE (OUTPATIENT)
Age: 24
End: 2024-08-13

## 2024-08-13 NOTE — TELEPHONE ENCOUNTER
LVM w/office number for PT to schedule next available growth u/s. May offer any location, next available.

## 2024-08-13 NOTE — TELEPHONE ENCOUNTER
----- Message from Joana TIMMONS sent at 8/13/2024  8:22 AM EDT -----  Regarding: FW: Needs to be scheduled for an Ultrasound  Patient needs growth US between 32-34 weeks in [place], no openings seen until September. Please advise.  ----- Message -----  From: Rebecca Cuenca RN  Sent: 8/12/2024   5:22 PM EDT  To: Aishwarya Jean Baptiste; Joana Nguyen  Subject: FW: Needs to be scheduled for an Ultrasound      Hey, can either one of you help coordinate an appt for this patient. Thank you!!  ----- Message -----  From: Eliza Arce  Sent: 8/7/2024   5:48 PM EDT  To: Obgyn Pod Clinical  Subject: Needs to be scheduled for an Ultrasound          Hi All,  This lady is new to gestational diabetes. Please call her to schedule her for an ultrasound.  Thanks!  Eliza Arce, MS, RD, CDE  Diabetes Educator   Diabetes and Pregnancy Program

## 2024-08-14 ENCOUNTER — TELEPHONE (OUTPATIENT)
Dept: OBGYN CLINIC | Facility: CLINIC | Age: 24
End: 2024-08-14

## 2024-08-14 ENCOUNTER — PATIENT MESSAGE (OUTPATIENT)
Dept: OBGYN CLINIC | Facility: CLINIC | Age: 24
End: 2024-08-14

## 2024-08-14 ENCOUNTER — OFFICE VISIT (OUTPATIENT)
Dept: PHYSICAL THERAPY | Facility: CLINIC | Age: 24
End: 2024-08-14
Payer: COMMERCIAL

## 2024-08-14 DIAGNOSIS — M54.9 BACK PAIN IN PREGNANCY: Primary | ICD-10-CM

## 2024-08-14 DIAGNOSIS — O99.891 BACK PAIN IN PREGNANCY: Primary | ICD-10-CM

## 2024-08-14 PROCEDURE — 97530 THERAPEUTIC ACTIVITIES: CPT

## 2024-08-14 PROCEDURE — 97110 THERAPEUTIC EXERCISES: CPT

## 2024-08-14 NOTE — TELEPHONE ENCOUNTER
Reason for Call: Appointment (Was scheduled for class 2 appt at 8am. Pt has not yet started testing blood sugars. ) and Gestational Diabetes (31w1d)    Outcome: Answered patient's questions regarding meal plan and blood sugar testing recommendations. Due to pt not yet beginning to test blood sugars, class 2 scheduled for 8/22/24 at 8am with Eliza Arce.    Franchesca Person RD  Diabetes Educator   [facility]  Diabetes and Pregnancy Program

## 2024-08-14 NOTE — PROGRESS NOTES
Daily Note     Today's date: 2024  Patient name: Krysta Santos  : 2000  MRN: [id number]  Referring provider: Rama Marrero*  Dx:   Encounter Diagnosis     ICD-10-CM    1. Back pain in pregnancy  O99.891     M54.9           Start Time: 1515  Stop Time: 1600  Total time in clinic (min): 45 minutes    Subjective:     History of Present Illness:   Currently 29 weeks gestation. LBP w/ c/o sciatic pain to the thigh.     Objective: See treatment diary below      PERFECT Score   Power right: 4/5   Power left: 4/5   Endurance (seconds to max): 6    Fast flicks (in 10 seconds): 7   Perfect Score: Slight TTP to L OI.     Assessment: Pt shows decrease in LBP during activities. Core strength w. Functional stretches were introduced for pelvic floor support. Tolerated treatment well. Patient demonstrated fatigue post treatment and exhibited good technique with therapeutic exercises        Plan: Continue per plan of care.  Incorporate more relaxation techniques and stretching NV.      Precautions: currently pregnant.   Patient Active Problem List   Diagnosis    28 weeks gestation of pregnancy    Nausea/vomiting in pregnancy    Group B streptococcal bacteriuria    Rubella non-immune status, antepartum    Rh negative state in antepartum period    Encounter for supervision of normal first pregnancy in second trimester     Access Code: [email or web address]     PRO EVAL RE-EVAL DISCHARGE   PFDI      CHANCE-18      VPQ      CPSI-NIH      PGQ 44          POC Expires Auth Status Start Date Exp Date PT Visit Limit Unit limit DA provider     18 BOMN          Date of Service           Visits Used 1 2          Visits Remaining 17 16                      Manuals                                                            Neuro Re-Ed              2X5 elevators  2x15 lift off                                                                                   Ther Ex            Side plank clam 3x10 BlTB 3x10   BlTB           Seated abd w/ hinge 3x10 BluTB           Glute thrust  3x6 SL          [place]  3x5s ea                                  Ther Activity            Education Anatomy and POC            education Urge supression           Suitcase RDL  3x8 20#           Side step  3x 15 ft lap  GTB                                              Modalities

## 2024-08-14 NOTE — TELEPHONE ENCOUNTER
----- Message from Alexa GALDAMEZ sent at 3/22/2024 10:24 AM EDT -----  Regarding: 3rd Tri Call  8/6-9/3

## 2024-08-15 ENCOUNTER — TELEPHONE (OUTPATIENT)
Age: 24
End: 2024-08-15

## 2024-08-16 ENCOUNTER — TELEPHONE (OUTPATIENT)
Age: 24
End: 2024-08-16

## 2024-08-16 NOTE — TELEPHONE ENCOUNTER
Pt calling to follow up and clarify how frequently she should take her blood glucoses. RN reviewed telemedicine note from 8/7/24 with Diabetes Educator instructing to take blood glucoses x4 daily - fasting, and 2 hours after start of each meal. Pt verbalized an understanding. No further questions.

## 2024-08-19 DIAGNOSIS — O21.9 NAUSEA/VOMITING IN PREGNANCY: ICD-10-CM

## 2024-08-19 PROBLEM — U07.1 COVID: Status: ACTIVE | Noted: 2024-07-31

## 2024-08-19 PROBLEM — Z34.03 ENCOUNTER FOR SUPERVISION OF NORMAL FIRST PREGNANCY IN THIRD TRIMESTER: Status: ACTIVE | Noted: 2024-05-01

## 2024-08-19 PROBLEM — Z3A.31 31 WEEKS GESTATION OF PREGNANCY: Status: ACTIVE | Noted: 2024-03-14

## 2024-08-19 RX ORDER — ONDANSETRON 4 MG/1
TABLET, ORALLY DISINTEGRATING ORAL
Qty: 20 TABLET | Refills: 5 | Status: SHIPPED | OUTPATIENT
Start: 2024-08-19

## 2024-08-20 ENCOUNTER — ROUTINE PRENATAL (OUTPATIENT)
Age: 24
End: 2024-08-20
Payer: COMMERCIAL

## 2024-08-20 VITALS
DIASTOLIC BLOOD PRESSURE: 64 MMHG | HEART RATE: 76 BPM | OXYGEN SATURATION: 98 % | HEIGHT: 62 IN | WEIGHT: 123.2 LBS | SYSTOLIC BLOOD PRESSURE: 94 MMHG | BODY MASS INDEX: 22.67 KG/M2

## 2024-08-20 DIAGNOSIS — O24.410 DIET CONTROLLED GESTATIONAL DIABETES MELLITUS (GDM) IN THIRD TRIMESTER: ICD-10-CM

## 2024-08-20 DIAGNOSIS — O99.013 ANEMIA DURING PREGNANCY IN THIRD TRIMESTER: ICD-10-CM

## 2024-08-20 DIAGNOSIS — Z3A.31 31 WEEKS GESTATION OF PREGNANCY: ICD-10-CM

## 2024-08-20 DIAGNOSIS — Z67.91 RH NEGATIVE STATE IN ANTEPARTUM PERIOD: ICD-10-CM

## 2024-08-20 DIAGNOSIS — O26.899 RH NEGATIVE STATE IN ANTEPARTUM PERIOD: ICD-10-CM

## 2024-08-20 DIAGNOSIS — R82.71 GROUP B STREPTOCOCCAL BACTERIURIA: ICD-10-CM

## 2024-08-20 DIAGNOSIS — Z34.03 PRENATAL CARE, FIRST PREGNANCY IN THIRD TRIMESTER: Primary | ICD-10-CM

## 2024-08-20 LAB
SL AMB  POCT GLUCOSE, UA: NORMAL
SL AMB POCT URINE PROTEIN: NORMAL

## 2024-08-20 PROCEDURE — 81002 URINALYSIS NONAUTO W/O SCOPE: CPT | Performed by: NURSE PRACTITIONER

## 2024-08-20 PROCEDURE — 99213 OFFICE O/P EST LOW 20 MIN: CPT | Performed by: NURSE PRACTITIONER

## 2024-08-20 NOTE — ASSESSMENT & PLAN NOTE
She feels well. She denies LOF/CTX/VB. She did not have any concerns today. We discussed fetal kick counting. Overview charting updated today.

## 2024-08-20 NOTE — PATIENT INSTRUCTIONS
Patient Education     Pregnancy - The Seventh Month   About this topic   It is important for you to learn how to take care of yourself to help you have a healthy baby and safe delivery. It is good to have health care throughout your pregnancy.  The seventh month of your pregnancy starts around week 29 and lasts through week 32. By knowing how far along you are, you can learn what is normal for this stage of your pregnancy and plan for what is next.  General   Your body   During the seventh month of your pregnancy, here are some things you can expect.  You may:  Have weight gain of about 15 to 20 pounds (6.8 to 9 kg) total in your first 7 months.  Have more trouble moving about and sleeping as the baby gets bigger  Have very vivid dreams  Notice more vaginal discharge  Notice a creamy, watery substance leaking from your nipples  Need a shot called Rh immune globulin if your blood type may be different from your baby's  Your baby's growth and development:  Your baby is gaining weight and adding layers of fat.  Your baby turns to be head down, into the position for delivery.  They are able to respond to loud noises and to dream.  Your baby moves at least 10 times in 2 hours. If your baby isn't moving this much, talk to your doctor right away.  Your baby is about 16 inches (42 cm) long and weighs about 4 pounds (1,800 gm). Your baby is about the size of squash.  Things to Think About   Avoid alcohol, drugs, tobacco products, and second hand smoke  Think about what you want your baby's birth to be like. Who do you want with you?  Find out about what lactation services are covered by your insurance.  Look into lactation consultants and breastfeeding classes.  Where do you want to deliver? It’s time to make a plan for labor and delivery.  Plan on getting a car seat and other things for your baby.  Talk to your doctor if you plan to travel or get on a plane.  When do I need to call the doctor?   Contractions every 10  minutes or more often that do not go away with drinking water or position changes.  Low, dull back pain that does not go away  Feeling unusually dizzy or tired  Pressure in your pelvis that feels like your baby is pushing down  A gush or constant trickle of watery or bloody fluid leaking from your vagina  Cramps in your lower belly that come and go or are constant  Little to no movement felt by baby in 2 hours. Your baby should be moving at least 10 times every 2 hours.  Headache that does not go away; blurry vision; seeing spots or halos; increase in swelling in your hands, feet, or face; and pain under your ribs on the right side  Vaginal bleeding with or without pain  Fever of 100.4°F (38°C) or higher  After a car accident, fall, or any trauma to your belly  Having thoughts of harming yourself or others, or do not feel safe at home  Last Reviewed Date   2020-05-06  Consumer Information Use and Disclaimer   This generalized information is a limited summary of diagnosis, treatment, and/or medication information. It is not meant to be comprehensive and should be used as a tool to help the user understand and/or assess potential diagnostic and treatment options. It does NOT include all information about conditions, treatments, medications, side effects, or risks that may apply to a specific patient. It is not intended to be medical advice or a substitute for the medical advice, diagnosis, or treatment of a health care provider based on the health care provider's examination and assessment of a patient’s specific and unique circumstances. Patients must speak with a health care provider for complete information about their health, medical questions, and treatment options, including any risks or benefits regarding use of medications. This information does not endorse any treatments or medications as safe, effective, or approved for treating a specific patient. [employer] and its affiliates disclaim any warranty or  liability relating to this information or the use thereof. The use of this information is governed by the Terms of Use, available at https://www.wolterskluwer.com/en/know/clinical-effectiveness-terms   Copyright   Copyright © 2024 [employer] and its affiliates and/or licensors. All rights reserved.

## 2024-08-21 ENCOUNTER — OFFICE VISIT (OUTPATIENT)
Dept: PHYSICAL THERAPY | Facility: CLINIC | Age: 24
End: 2024-08-21
Payer: COMMERCIAL

## 2024-08-21 DIAGNOSIS — M54.9 BACK PAIN IN PREGNANCY: Primary | ICD-10-CM

## 2024-08-21 DIAGNOSIS — O99.891 BACK PAIN IN PREGNANCY: Primary | ICD-10-CM

## 2024-08-21 PROCEDURE — 97530 THERAPEUTIC ACTIVITIES: CPT

## 2024-08-21 PROCEDURE — 97112 NEUROMUSCULAR REEDUCATION: CPT

## 2024-08-21 NOTE — PROGRESS NOTES
Daily Note     Today's date: 2024  Patient name: Krysta Santos  : 2000  MRN: [id number]  Referring provider: Rama Marrero*  Dx:   Encounter Diagnosis     ICD-10-CM    1. Back pain in pregnancy  O99.891     M54.9             Start Time: 0815  Stop Time: 0900  Total time in clinic (min): 45 minutes    Subjective: - currently 32 weeks gestation. She has not had anymore back pain.       History of Present Illness:   Currently 29 weeks gestation. LBP w/ c/o sciatic pain to the thigh.     Objective: See treatment diary below      PERFECT Score   Power right: 4/5   Power left: 4/5   Endurance (seconds to max): 6    Fast flicks (in 10 seconds): 7   Perfect Score: Slight TTP to L OI.     Assessment: Pt shows decrease in LBP during activities. Core strength w. Functional stretches were introduced for pelvic floor support. Increased focus on pelvic floor isolation and relaxation in prep for labor techniques was well tolerated. Tolerated treatment well. Patient demonstrated fatigue post treatment and exhibited good technique with therapeutic exercises        Plan: Continue per plan of care.       Precautions: currently pregnant.   Patient Active Problem List   Diagnosis    28 weeks gestation of pregnancy    Nausea/vomiting in pregnancy    Group B streptococcal bacteriuria    Rubella non-immune status, antepartum    Rh negative state in antepartum period    Encounter for supervision of normal first pregnancy in second trimester     Access Code: [email or web address]     PRO EVAL RE-EVAL DISCHARGE   PFDI      CHANCE-18      VPQ      CPSI-NIH      PGQ 44          POC Expires Auth Status Start Date Exp Date PT Visit Limit Unit limit DA provider     18 BOMN          Date of Service          Visits Used 1 2 3         Visits Remaining 17 16 15                     Manuals                                                            Neuro Re-Ed              2X5 elevators  2x15 lift off  2x15            Romain pose    4x30s          1/2 kneel stretch and elevator   4x10                                                         Ther Ex            Side plank clam 3x10 BlTB 3x10   BlTB          Seated abd w/ hinge 3x10 BluTB           Glute thrust  3x6 SL          [place]  3x5s ea          Bird dog crunch   3x10                     Ther Activity            Education Anatomy and POC            education Urge supression           Suitcase RDL  3x8 20#  2x8 ea 25# KS         Side step  3x 15 ft lap  GTB 3x20 ft lap Blue TB                                             Modalities

## 2024-08-22 ENCOUNTER — TELEMEDICINE (OUTPATIENT)
Dept: PERINATAL CARE | Facility: CLINIC | Age: 24
End: 2024-08-22
Payer: COMMERCIAL

## 2024-08-22 DIAGNOSIS — O24.410 DIET CONTROLLED GESTATIONAL DIABETES MELLITUS (GDM) IN THIRD TRIMESTER: Primary | ICD-10-CM

## 2024-08-22 DIAGNOSIS — Z34.01 ENCOUNTER FOR SUPERVISION OF NORMAL FIRST PREGNANCY IN FIRST TRIMESTER: ICD-10-CM

## 2024-08-22 DIAGNOSIS — Z3A.32 32 WEEKS GESTATION OF PREGNANCY: ICD-10-CM

## 2024-08-22 PROCEDURE — G0108 DIAB MANAGE TRN  PER INDIV: HCPCS | Performed by: DIETITIAN, REGISTERED

## 2024-08-22 NOTE — TELEPHONE ENCOUNTER
HI I haven't spoken to her- I just received this message forwarded to me today. I'm not sure how to have it notarized for her- but I can print out a note and sign it if she can come to the office to pick it up

## 2024-08-22 NOTE — PROGRESS NOTES
"      Thank you for referring your patient to [facility] Maternal Fetal Medicine Diabetes in Pregnancy Program.     Krysta Santos is a  24 y.o. female who presents today for Individual  Class 2.  Patient is at 32w2d gestation, Estimated Date of Delivery: 10/15/24.     Reviewed and updated the following from patients medical record: PMH, Problem List, Allergies, and Current Medications.    Visit Diagnosis:  Diet controlled GDM    Additional Pregnancy Complications:  None    Labs:    Lab Results   Component Value Date    GOS1ORFH77PE 165 (H) 2024       Lab Results   Component Value Date    GLUF 80 2024    JTLMPZZ5NF 192 (H) 2024    VAXNILU1RA 167 (H) 2024    ZZICIPX5PU 125 2024        No components found for: \"HGA1C\"    Medications:  No diabetes related medications    Anthropometrics:  Ht Readings from Last 3 Encounters:   24 5' 2\" (1.575 m)   24 5' 2\" (1.575 m)   24 5' 2\" (1.575 m)     Wt Readings from Last 3 Encounters:   24 55.9 kg (123 lb 3.2 oz)   24 54.4 kg (120 lb)   24 55.8 kg (123 lb)     Pre-gravid weight: 49.9 kg (110 lb)  Pre-gravid BMI: 20.11  Weight Change: 5.987 kg (13 lb 3.2 oz)  Weight gain recommendations: BMI (18.5-24.9) 25-35 lbs  Comments: may gain 21 pounds for the remainder of the pregnancy    Recent Ultra Sound Results:  Date:24  Fetal Growth:Normal  SVETA: Normal  Next  date: 24     Blood Glucose Monitoring:  Reinforcement of blood glucose goals and reporting guidelines.     Glucose Meter: Contour Next EZ  Testing blood sugars: 4 x per day (Fasting, 2 hour after start of each meal)  Method of reporting blood sugars:Glucose Flowsheet    Report blood sugar results weekly via:  Phone: [phone]   OR  My Chart (Message with image attachment, or Glucose Flowsheet)    Goal Blood Sugar Ranges:   Fastin-95 mg/dL  1 hour after the start of each meal: 140 mg/dL or less  2 hours after start of each meal: 120 mg/dL or " "less      BG Log:  Review of blood glucose log.  Discussed at Class 2      Meal Plan:  Calories: 1800 calorie (CHO: 45-15-45-15-45-30) (PRO:2-1-3-1-3-2)    Diet Type: Low Carbohydrate  Additional Nutrition concerns: Intolerance to milk protein & soy. \"Can tolerate yogurt & cheese.     24 hr Diet Recall:  Provided at this class.    Diet review: Eating 3 meals & 2 snacks.  Skips bedtime snack most of the time. Encouraged patient to begin eating the bedtime snack. Uses a plant-based protein shake for her mid-morning snack-Isopure or PLNT. Very healthy eating habits. Seldom dines out--patient &  cook most of the times at home.    Diet Instruction:  The patient was instructed on the following:  Individualized meal plan.   Importance of consistent carbohydrate intake via 3 meals and 3 snacks per day   Importance of protein as it relates to blood glucose control.   Encouraged  patient to eat every 2.0-3.5 hours while awake  Encouraged patient to go no longer than 8-10 hours fasting overnight until first meal of the day.  Provided suggested meal/snack options to increase nutrition and maintain consistent meal and snack intakes.      Physical Activity:  Discussed benefits of physical activity to optimize blood glucose control, encouraged activity at patient is physically able. Always consult a physician prior to starting an exercise program. Recommend 20-30 minutes daily.  Is patient physically active? Yes Walks outside on her treadmill.     Sick day Guidelines:   Patient advised that sickness will raise blood sugar and need to continue medication regimen as directed. If blood sugar is > 160 mg/dL twice in one day call doctor. Instructed on what to do when unable to consume normal meal plan.     Hypoglycemia & Treatment Guidelines:  Reviewed what hypoglycemia is, signs and symptoms, and how to treat via the 15:15 rule.    Post-Partum Guidelines:  Completion of 75 gm CHO 2 hr gtt at 6 weeks post-partum to check for " "Type 2 DM diagnosis    Breastfeeding Guidelines:  Continue GDM meal plan plus additional 350-500 calories daily. Stay hydrated by drinking 8-10 (8 oz.) fluids daily. Examples of protein and carbohydrate snacks provided.    Dining Out & Travel Guidelines:  Patient advised to be prepared with extra diabetes supplies, medications, and snacks, as well as sticking to the same time schedule and portions eaten at home for meals and snacks.    Maternal-Fetal Testing:    Ultrasounds- growth scans every 4 weeks.   NST- twice weekly starting at 32nd week GA   SVETA- weekly starting at 32 weeks GA    Patient Stated Goal: \"I will plan my meals and snacks every day\"  Goal Assessment: On track    Diabetes Self Management Support Plan outside of ongoing care: Spouse/Family    Learner/s Present:Learners Present: Patient   Barriers to Learning/Change: Financial  Expected Compliance: good    Date to report blood sugars: Weekly  Follow up date: as needed    Begin Time: 8:05 AM  End Time: 9 AM    It was a pleasure working with them today. Please feel free to call with any questions or concerns.    Eliza Arce  Diabetes Educator  [facility] Maternal Fetal Medicine  Diabetes in Pregnancy Program  [address] [place] [address], [address]  [place] [address]     Virtual Regular Visit  Name: Krysta Santos      : 2000      MRN: [id number]  Encounter Provider: Eliza Arce  Encounter Date: 2024   Encounter department: [facility]    Verification of patient location:    Patient is located at Other in the following state in which I hold an active license [place]    Assessment & Plan   1. Diet controlled gestational diabetes mellitus (GDM) in third trimester  2. 32 weeks gestation of pregnancy  3. Encounter for supervision of normal first pregnancy in first trimester  -     Ambulatory Referral to Maternal Fetal Medicine        Encounter provider Eliza Arce    The patient was identified by name and date " of birth. Krysta Santos was informed that this is a telemedicine visit and that the visit is being conducted through the Epic Embedded platform. She agrees to proceed..  My office door was closed. No one else was in the room.  She acknowledged consent and understanding of privacy and security of the video platform. The patient has agreed to participate and understands they can discontinue the visit at any time.    Patient is aware this is a billable service.     History of Present Illness     Krysta Santos is a 24 y.o. female who presents with pregnancy    Review of Systems    Objective     LMP 01/09/2024 (Exact Date)   Physical Exam --not performed    Visit Time  Total Visit Duration: 60 minutes

## 2024-08-26 PROBLEM — O98.512 COVID-19 AFFECTING PREGNANCY IN SECOND TRIMESTER: Status: ACTIVE | Noted: 2024-07-31

## 2024-08-27 ENCOUNTER — ULTRASOUND (OUTPATIENT)
Dept: PERINATAL CARE | Facility: OTHER | Age: 24
End: 2024-08-27
Payer: COMMERCIAL

## 2024-08-27 VITALS
DIASTOLIC BLOOD PRESSURE: 62 MMHG | HEART RATE: 84 BPM | HEIGHT: 62 IN | WEIGHT: 125.4 LBS | SYSTOLIC BLOOD PRESSURE: 126 MMHG | BODY MASS INDEX: 23.08 KG/M2

## 2024-08-27 DIAGNOSIS — Z3A.33 33 WEEKS GESTATION OF PREGNANCY: ICD-10-CM

## 2024-08-27 DIAGNOSIS — O98.512 COVID-19 AFFECTING PREGNANCY IN SECOND TRIMESTER: ICD-10-CM

## 2024-08-27 DIAGNOSIS — U07.1 COVID-19 AFFECTING PREGNANCY IN SECOND TRIMESTER: ICD-10-CM

## 2024-08-27 DIAGNOSIS — Z36.89 ENCOUNTER FOR ULTRASOUND TO ASSESS FETAL GROWTH: ICD-10-CM

## 2024-08-27 DIAGNOSIS — O24.410 DIET CONTROLLED GESTATIONAL DIABETES MELLITUS (GDM) IN THIRD TRIMESTER: Primary | ICD-10-CM

## 2024-08-27 PROCEDURE — 76816 OB US FOLLOW-UP PER FETUS: CPT | Performed by: OBSTETRICS & GYNECOLOGY

## 2024-08-27 PROCEDURE — 99213 OFFICE O/P EST LOW 20 MIN: CPT | Performed by: OBSTETRICS & GYNECOLOGY

## 2024-08-27 NOTE — PROGRESS NOTES
"[facility]: Krysta Santos was seen today for fetal growth assessment ultrasound.  See ultrasound report under \"OB Procedures\" tab.   The time spent on this established patient on the encounter date included 5 minutes previsit service time reviewing records and precharting, 10 minutes face-to-face service time counseling regarding results and coordinating care, and  5 minutes charting, totalling 20 minutes.  Please don't hesitate to contact our office with any concerns or questions.  -Adina Ravi MD    "

## 2024-08-28 ENCOUNTER — PATIENT MESSAGE (OUTPATIENT)
Age: 24
End: 2024-08-28

## 2024-08-28 ENCOUNTER — APPOINTMENT (OUTPATIENT)
Dept: PHYSICAL THERAPY | Facility: CLINIC | Age: 24
End: 2024-08-28
Payer: COMMERCIAL

## 2024-08-28 NOTE — PATIENT COMMUNICATION
Please let her know that a new, signed letter is available for her to  at the [facility] office when she is able to get it

## 2024-09-03 PROBLEM — Z3A.34 34 WEEKS GESTATION OF PREGNANCY: Status: ACTIVE | Noted: 2024-03-14

## 2024-09-04 ENCOUNTER — OFFICE VISIT (OUTPATIENT)
Dept: PHYSICAL THERAPY | Facility: CLINIC | Age: 24
End: 2024-09-04
Payer: COMMERCIAL

## 2024-09-04 DIAGNOSIS — O99.891 BACK PAIN IN PREGNANCY: Primary | ICD-10-CM

## 2024-09-04 DIAGNOSIS — M54.9 BACK PAIN IN PREGNANCY: Primary | ICD-10-CM

## 2024-09-04 PROCEDURE — 97530 THERAPEUTIC ACTIVITIES: CPT

## 2024-09-04 PROCEDURE — 97110 THERAPEUTIC EXERCISES: CPT

## 2024-09-04 NOTE — PROGRESS NOTES
Daily Note     Today's date: 2024  Patient name: Krysta Santos  : 2000  MRN: [id number]  Referring provider: Rama Marrero*  Dx:   Encounter Diagnosis     ICD-10-CM    1. Back pain in pregnancy  O99.891     M54.9               Start Time: 0730  Stop Time: 0815  Total time in clinic (min): 45 minutes    Subjective: - currently 32 weeks gestation. She has not had anymore back pain.     - 34 weeks gestation- noticing cramps in the groin area when getting up.       History of Present Illness:   Currently 29 weeks gestation. LBP w/ c/o sciatic pain to the thigh.     Objective: See treatment diary below      PERFECT Score   Power right: 4/5   Power left: 4/5   Endurance (seconds to max): 6    Fast flicks (in 10 seconds): 7   Perfect Score: Slight TTP to L OI.     Assessment: Pt shows good isolation of pelvic floor during open glottis breathing- good contraction of abdominals. HEP was revised and reviewed.  Tolerated treatment well. Patient demonstrated fatigue post treatment and exhibited good technique with therapeutic exercises. Pt would benefit from additional visit before delivery to go over labor and delivery techniques and perineal massages.         Plan: Continue per plan of care.       Precautions: currently pregnant.   Patient Active Problem List   Diagnosis    28 weeks gestation of pregnancy    Nausea/vomiting in pregnancy    Group B streptococcal bacteriuria    Rubella non-immune status, antepartum    Rh negative state in antepartum period    Encounter for supervision of normal first pregnancy in second trimester     Access Code: [email or web address]     PRO EVAL RE-EVAL DISCHARGE   PFDI      CHANCE-18      VPQ      CPSI-NIH      PGQ 44          POC Expires Auth Status Start Date Exp Date PT Visit Limit Unit limit DA provider     18 BOMN          Date of Service 8/2 8/14 8/21 9/3        Visits Used 1 2 3 4        Visits Remaining 17 16 15 14                    Manuals                                                             Neuro Re-Ed            90/90  2X5 elevators  2x15 lift off  2x15           Romain pose    4x30s          1/2 kneel stretch and elevator   4x10         Adductor roll back w/ breathe    2x15                                            Ther Ex            Standing Add    3x12 Blue TB        Cossack lunge    3x8        Side plank clam 3x10 BlTB 3x10   BlTB          Seated abd w/ hinge 3x10 BluTB           Glute thrust  3x6 SL  3x8 SL        c[place]  3x5s ea          Bird dog crunch   3x10                     Ther Activity            Education Anatomy and POC            education Urge supression           Suitcase RDL  3x8 20#  2x8 ea 25# KS         Side step  3x 15 ft lap  GTB 3x20 ft lap Blue TB         Labor and delivery prep education     25' AL                                 Modalities

## 2024-09-05 ENCOUNTER — ROUTINE PRENATAL (OUTPATIENT)
Age: 24
End: 2024-09-05
Payer: COMMERCIAL

## 2024-09-05 VITALS
DIASTOLIC BLOOD PRESSURE: 68 MMHG | HEART RATE: 89 BPM | TEMPERATURE: 96.9 F | HEIGHT: 62 IN | BODY MASS INDEX: 22.82 KG/M2 | SYSTOLIC BLOOD PRESSURE: 108 MMHG | WEIGHT: 124 LBS

## 2024-09-05 DIAGNOSIS — Z34.03 PRENATAL CARE, FIRST PREGNANCY IN THIRD TRIMESTER: ICD-10-CM

## 2024-09-05 DIAGNOSIS — Z3A.34 34 WEEKS GESTATION OF PREGNANCY: Primary | ICD-10-CM

## 2024-09-05 DIAGNOSIS — O24.410 DIET CONTROLLED GESTATIONAL DIABETES MELLITUS (GDM) IN THIRD TRIMESTER: ICD-10-CM

## 2024-09-05 DIAGNOSIS — O99.013 ANEMIA DURING PREGNANCY IN THIRD TRIMESTER: ICD-10-CM

## 2024-09-05 LAB
SL AMB  POCT GLUCOSE, UA: ABNORMAL
SL AMB POCT URINE PROTEIN: 1

## 2024-09-05 PROCEDURE — 81002 URINALYSIS NONAUTO W/O SCOPE: CPT | Performed by: OBSTETRICS & GYNECOLOGY

## 2024-09-05 PROCEDURE — 99214 OFFICE O/P EST MOD 30 MIN: CPT | Performed by: OBSTETRICS & GYNECOLOGY

## 2024-09-05 NOTE — ASSESSMENT & PLAN NOTE
BG well controlled  U/s on 8/27 EFW  2224 grams - 4 lbs 14 oz (59%)   Has f/u on 9/24  Lab Results   Component Value Date    HGBA1C 4.7 06/14/2024

## 2024-09-05 NOTE — PROGRESS NOTES
"Problem List Items Addressed This Visit       34 weeks gestation of pregnancy - Primary     Pt doing well today. Feels like she is developing cold symptoms- reviewed safe OTC medications, provided OTC medication sheet again today.   +FM. Denies ctx, vb and lof.   Up to date on care  Reviewed starting perineal massage.  GBS + from urine  Precautions reviewed. RTO in 2 weeks         Diet controlled gestational diabetes mellitus (GDM) in third trimester     BG well controlled  U/s on  EFW  2224 grams - 4 lbs 14 oz (59%)   Has f/u on   Lab Results   Component Value Date    HGBA1C 4.7 2024            Anemia during pregnancy in third trimester     Pt taking oral fe  Rpt cbc ordered         Relevant Orders    CBC and differential     Other Visit Diagnoses       Prenatal care, first pregnancy in third trimester        Relevant Orders    POCT urine dip            Krysta Santos is a 24 y.o.  at 34w2d who presents today for routine prenatal visit.     /68 (BP Location: Left arm, Patient Position: Sitting, Cuff Size: Adult)   Pulse 89   Temp (!) 96.9 °F (36.1 °C) (Temporal)   Ht 5' 2\" (1.575 m)   Wt 56.2 kg (124 lb)   LMP 2024 (Exact Date)   BMI 22.68 kg/m²       FH 34 cm    "

## 2024-09-05 NOTE — PROGRESS NOTES
Patient presents for a routine prenatal visit    34W2D  Good Fetal Movement  No LOF,bleeding,discharge or cramping.  No current complaints at this time.   Delivery consent is signed.  GBS positive in urine  UTD on Rhogam and Tdap.

## 2024-09-05 NOTE — ASSESSMENT & PLAN NOTE
Pt doing well today. Feels like she is developing cold symptoms- reviewed safe OTC medications, provided OTC medication sheet again today.   +FM. Denies ctx, vb and lof.   Up to date on care  Reviewed starting perineal massage.  GBS + from urine  Precautions reviewed. RTO in 2 weeks

## 2024-09-13 ENCOUNTER — DOCUMENTATION (OUTPATIENT)
Dept: PERINATAL CARE | Facility: CLINIC | Age: 24
End: 2024-09-13

## 2024-09-13 NOTE — PROGRESS NOTES
"  Date: 09/13/24  Krysta Santos  2000  Estimated Date of Delivery: 10/15/24  35w3d  OB/GYN:[facility]    Diagnosis:  Diet controlled GDM    Blood Sugar Logs Submitted via: Glucose Flowsheet          Assessment and Plan:  No diabetes related medications  1800 calorie (CHO: 45-15-45-15-45-30) (PRO:2-1-3-1-3-2) meal plan consisting of 3 meals and 3 snacks daily including protein at each. Uses Isopure or PLNT protein shake for her mid-morning snack.   Advised patient to continue current meal plan  Avoid fasting for > 10 hours overnight  Continue SMBG 4 x per day (Fasting, 2 hour after start of each meal) with a Contour Next EZ glucose meter.   Walks outside daily or on treadmill & does pregnancy exercises.     Lab Work:   Lab Results   Component Value Date    HGBA1C 4.7 06/14/2024      Ultrasound:       Date:8/27/24       Fetal Growth: Normal       SVETA: Normal  Next: 9/24/24    Diabetes Self Management Support Plan outside of ongoing care: Spouse/Family   Patient Stated Goal: \"I will plan my meals and snacks every day\"   Goal Assessment: On track    Date to report next: weekly     Eliza Arce, MS, RD, [facility]  Diabetes Educator  [facility] Maternal Fetal Medicine  Diabetes in Pregnancy Program  [address], [address]  [place] [address]    "

## 2024-09-17 ENCOUNTER — ROUTINE PRENATAL (OUTPATIENT)
Dept: OBGYN CLINIC | Facility: MEDICAL CENTER | Age: 24
End: 2024-09-17
Payer: COMMERCIAL

## 2024-09-17 VITALS
SYSTOLIC BLOOD PRESSURE: 112 MMHG | BODY MASS INDEX: 23.05 KG/M2 | WEIGHT: 126 LBS | DIASTOLIC BLOOD PRESSURE: 70 MMHG | HEART RATE: 76 BPM | OXYGEN SATURATION: 99 %

## 2024-09-17 DIAGNOSIS — Z67.91 RH NEGATIVE STATE IN ANTEPARTUM PERIOD: Primary | ICD-10-CM

## 2024-09-17 DIAGNOSIS — R82.71 GROUP B STREPTOCOCCAL BACTERIURIA: ICD-10-CM

## 2024-09-17 DIAGNOSIS — O24.410 DIET CONTROLLED GESTATIONAL DIABETES MELLITUS (GDM) IN THIRD TRIMESTER: ICD-10-CM

## 2024-09-17 DIAGNOSIS — O99.013 ANEMIA DURING PREGNANCY IN THIRD TRIMESTER: ICD-10-CM

## 2024-09-17 DIAGNOSIS — O26.899 RH NEGATIVE STATE IN ANTEPARTUM PERIOD: Primary | ICD-10-CM

## 2024-09-17 DIAGNOSIS — Z29.11 NEED FOR RSV VACCINATION: ICD-10-CM

## 2024-09-17 DIAGNOSIS — F41.9 ANXIETY: ICD-10-CM

## 2024-09-17 DIAGNOSIS — Z3A.36 36 WEEKS GESTATION OF PREGNANCY: ICD-10-CM

## 2024-09-17 LAB
SL AMB  POCT GLUCOSE, UA: NORMAL
SL AMB POCT URINE PROTEIN: NORMAL

## 2024-09-17 PROCEDURE — 90471 IMMUNIZATION ADMIN: CPT

## 2024-09-17 PROCEDURE — 99215 OFFICE O/P EST HI 40 MIN: CPT | Performed by: STUDENT IN AN ORGANIZED HEALTH CARE EDUCATION/TRAINING PROGRAM

## 2024-09-17 PROCEDURE — 90678 RSV VACC PREF BIVALENT IM: CPT

## 2024-09-17 RX ORDER — SERTRALINE HYDROCHLORIDE 25 MG/1
25 TABLET, FILM COATED ORAL DAILY
Qty: 30 TABLET | Refills: 1 | Status: SHIPPED | OUTPATIENT
Start: 2024-09-17

## 2024-09-17 NOTE — ASSESSMENT & PLAN NOTE
Following with DP, reviewed delivery window 39+0-40+6  Interested in avoid interventions, specifically IOL and  if not needed.  Lab Results   Component Value Date    HGBA1C 4.7 2024

## 2024-09-17 NOTE — ASSESSMENT & PLAN NOTE
Patient is feeling very anxious about the possibility of getting PPD as she had significant depression in 2020.   She would like to restart sertraline  Plan for 25 mg now to delivery and increase to 50 at delivery unless requested sooner

## 2024-09-17 NOTE — ASSESSMENT & PLAN NOTE
23 yo  at 36+0 here for routine ob visit. Feeling well. No contractions, leaking or bleeding. Good fetal movement. RSV vaccine completed today. Return in 1 wk. Closed and thick but soft.

## 2024-09-17 NOTE — PROGRESS NOTES
Group B streptococcal bacteriuria  Will need antibiotics in labor  No allergies    36 weeks gestation of pregnancy  23 yo  at 36+0 here for routine ob visit. Feeling well. No contractions, leaking or bleeding. Good fetal movement. RSV vaccine completed today. Return in 1 wk. Closed and thick but soft.    Rh negative state in antepartum period  Rhogam 24    Anemia during pregnancy in third trimester  9.9 at 28 wks  Repeat recommended    Diet controlled gestational diabetes mellitus (GDM) in third trimester  Following with DP, reviewed delivery window 39+0-40+6  Interested in avoid interventions, specifically IOL and  if not needed.  Lab Results   Component Value Date    HGBA1C 4.7 2024       Anxiety  Patient is feeling very anxious about the possibility of getting PPD as she had significant depression in .   She would like to restart sertraline  Plan for 25 mg now to delivery and increase to 50 at delivery unless requested sooner    Administrative Statements   I have spent a total time of 40 minutes in caring for this patient on the day of the visit/encounter including Prognosis, Risks and benefits of tx options, Instructions for management, Patient and family education, Importance of tx compliance, Risk factor reductions, Impressions, Counseling / Coordination of care, Documenting in the medical record, Reviewing / ordering tests, medicine, procedures  , and Obtaining or reviewing history  .

## 2024-09-18 ENCOUNTER — OFFICE VISIT (OUTPATIENT)
Dept: PHYSICAL THERAPY | Facility: CLINIC | Age: 24
End: 2024-09-18
Payer: COMMERCIAL

## 2024-09-18 DIAGNOSIS — M54.9 BACK PAIN IN PREGNANCY: Primary | ICD-10-CM

## 2024-09-18 DIAGNOSIS — O99.891 BACK PAIN IN PREGNANCY: Primary | ICD-10-CM

## 2024-09-18 PROCEDURE — 97112 NEUROMUSCULAR REEDUCATION: CPT

## 2024-09-18 PROCEDURE — 97530 THERAPEUTIC ACTIVITIES: CPT

## 2024-09-18 PROCEDURE — 97164 PT RE-EVAL EST PLAN CARE: CPT

## 2024-09-18 NOTE — PROGRESS NOTES
PT Re-Evaluation  and PT Discharge    Today's date: 2024  Patient name: Krysta Santos  : 2000  MRN: [id number]  Referring provider: Rama Marrero  Dx:   Encounter Diagnosis     ICD-10-CM    1. Back pain in pregnancy  O99.891     M54.9             Start Time: 0730  Stop Time: 0815  Total time in clinic (min): 45 minutes    Assessment  Symptom irritability: low    Assessment details: Pt has shown vast improvement in posterior pelvic girdle pain throughout pregnancy. She has good understanding of her HEP for maintaining her progress through the rest of the last trimester and showed good understanding of labor and delivery techniques to prevent complications and injury. PP POC was discussed w. Pt and she was agreeable. Pt is D/C today.     Understanding of Dx/Px/POC: good    Goals  STG: to be completed in 3 weeks   1.Pt will have good understanding and compliance of HEP. MET   2. Pt will show appropriate co-contraction of TvA w/ PF during lifting from floor to waist. MET     LTG: to be met by d/c   1. Pt will be able to bend and lift 25# w/o pain. MET   2. Pt will have good knowledge of different positions to open pelvic girdle during labor. MET   3. Pt will have good understand and adherence to perineal massage to prevent tearing. MET       Plan  Patient would benefit from: skilled physical therapy    Planned therapy interventions: joint mobilization, manual therapy, therapeutic activities and neuromuscular re-education    Frequency: 1x week  Duration in weeks: 8  Plan of Care beginning date: 2024  Plan of Care expiration date: 2024  Treatment plan discussed with: patient        PT Pelvic Floor Subjective:   History of Present Illness:   Currently 29 weeks gestation. LBP w/ c/o sciatic pain to the thigh.     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     D/C- currently 36 weeks gestation- no more back pain, occasional lightening crotch symptoms.   Currently has  "gestational diabetes- contributing to her increased frequency.   Diet and Exercise:      Exercise type: walking    half an hour a day.  OB/ gyn History    Gestational History:     Prior Pregnancy: No    Bladder Function:     Voiding Difficulties positive for: frequent urination (only at night)       Voiding Difficulties comments:     Voiding frequency: every 3-4 hours    Urinary leakage: no urine leakage    Nocturia (episodes per night): 4 or more    Intake (ounces): Water: 100,   Bowel Function:     Voiding DIfficulties: constipation    Sexual Function:     Sexually Active:  Sexually active    Pain during intercourse: No    Pain:     Current pain ratin    At best pain ratin    At worst pain rating:  3    Location:  LBP during walking    Quality:  Throbbing    Aggravating factors:  Walking (transfers from floor to standing.)    Pain duration: relief upon relaxation.    Relieving factors:  Relaxation  Patient Goals:     Patient goals for therapy:  Improved pain management    Other patient goals:  \"Survive this pregnncy and prepare for labor\" MET      Objective       Abdominal Assessment:      Abdominal Assessment: Slight belly button herniation during abdominal contraction.     Visual Inspection of Perineum:   Involuntary contraction with coughing: yes  Involuntary relaxation with bearing down: no        Pelvic Floor Muscle Exam:     Muscle Contraction: well isolated          PERFECT Score   Power right: 4/5   Power left: 4/5   Endurance (seconds to max): 6    Fast flicks (in 10 seconds): 7   Perfect Score: Slight TTP to L OI.     TONE: normal.       pelvic floor exam consent given by patient    Pelvic exam completed: vaginally                Precautions: currently pregnant.   Patient Active Problem List   Diagnosis    36 weeks gestation of pregnancy    Nausea/vomiting in pregnancy    Group B streptococcal bacteriuria    Rubella non-immune status, antepartum    Rh negative state in antepartum period    " Encounter for supervision of normal first pregnancy in third trimester    Diet controlled gestational diabetes mellitus (GDM) in third trimester    Anemia during pregnancy in third trimester    COVID-19 affecting pregnancy in second trimester    Anxiety     Access Code: [email or web address]     PRO EVAL RE-EVAL DISCHARGE   PFDI      CHANCE-18      VPQ      CPSI-NIH      PGQ 44  10.67         POC Expires Auth Status Start Date Exp Date PT Visit Limit Unit limit DA provider   9/27 8/2  18 BOMN          Date of Service 8/2 8/14 8/21 9/3 9/17       Visits Used 1 2 3 4 5       Visits Remaining 17 16 15 14                    Manuals                                                            Neuro Re-Ed            90/90  2X5 elevators  2x15 lift off  2x15    2x30s ea        Romain pose    4x30s   2'       1/2 kneel stretch and elevator   4x10  4x30s ea       Adductor roll back w/ breathe    2x15        inversion     Puppy pose 3'        inversion                        Ther Ex            Standing Add    3x12 Blue TB        Cossack lunge    3x8        Side plank clam 3x10 BlTB 3x10   BlTB          Seated abd w/ hinge 3x10 BluTB           Glute thrust  3x6 SL  3x8 SL        copenhagen  3x5s ea          Bird dog crunch   3x10                     Ther Activity            education Urge supression    PP care  5'        Suitcase RDL  3x8 20#  2x8 ea 25# KS         Side step  3x 15 ft lap  GTB 3x20 ft lap Blue TB         KB squat     X10 25#        Labor and delivery prep education     25' AL  25'AL                                Modalities

## 2024-09-23 ENCOUNTER — APPOINTMENT (OUTPATIENT)
Age: 24
End: 2024-09-23
Payer: COMMERCIAL

## 2024-09-23 DIAGNOSIS — O99.013 ANEMIA DURING PREGNANCY IN THIRD TRIMESTER: ICD-10-CM

## 2024-09-23 LAB
BASOPHILS # BLD MANUAL: 0.07 THOUSAND/UL (ref 0–0.1)
BASOPHILS NFR MAR MANUAL: 1 % (ref 0–1)
EOSINOPHIL # BLD MANUAL: 0.07 THOUSAND/UL (ref 0–0.4)
EOSINOPHIL NFR BLD MANUAL: 1 % (ref 0–6)
ERYTHROCYTE [DISTWIDTH] IN BLOOD BY AUTOMATED COUNT: 13.2 % (ref 11.6–15.1)
HCT VFR BLD AUTO: 34.1 % (ref 34.8–46.1)
HGB BLD-MCNC: 10.9 G/DL (ref 11.5–15.4)
LYMPHOCYTES # BLD AUTO: 1.92 THOUSAND/UL (ref 0.6–4.47)
LYMPHOCYTES # BLD AUTO: 18 % (ref 14–44)
MCH RBC QN AUTO: 29.7 PG (ref 26.8–34.3)
MCHC RBC AUTO-ENTMCNC: 32 G/DL (ref 31.4–37.4)
MCV RBC AUTO: 93 FL (ref 82–98)
MONOCYTES # BLD AUTO: 0.33 THOUSAND/UL (ref 0–1.22)
MONOCYTES NFR BLD: 5 % (ref 4–12)
NEUTROPHILS # BLD MANUAL: 4.24 THOUSAND/UL (ref 1.85–7.62)
NEUTS SEG NFR BLD AUTO: 64 % (ref 43–75)
PLATELET # BLD AUTO: 154 THOUSANDS/UL (ref 149–390)
PLATELET BLD QL SMEAR: ADEQUATE
PMV BLD AUTO: 11.7 FL (ref 8.9–12.7)
RBC # BLD AUTO: 3.67 MILLION/UL (ref 3.81–5.12)
RBC MORPH BLD: NORMAL
VARIANT LYMPHS # BLD AUTO: 11 %
WBC # BLD AUTO: 6.62 THOUSAND/UL (ref 4.31–10.16)

## 2024-09-23 PROCEDURE — 85027 COMPLETE CBC AUTOMATED: CPT

## 2024-09-23 PROCEDURE — 36415 COLL VENOUS BLD VENIPUNCTURE: CPT

## 2024-09-23 PROCEDURE — 85007 BL SMEAR W/DIFF WBC COUNT: CPT

## 2024-09-24 ENCOUNTER — NURSE TRIAGE (OUTPATIENT)
Age: 24
End: 2024-09-24

## 2024-09-24 ENCOUNTER — ULTRASOUND (OUTPATIENT)
Dept: PERINATAL CARE | Facility: OTHER | Age: 24
End: 2024-09-24
Payer: COMMERCIAL

## 2024-09-24 VITALS
BODY MASS INDEX: 23.19 KG/M2 | HEART RATE: 83 BPM | DIASTOLIC BLOOD PRESSURE: 58 MMHG | HEIGHT: 62 IN | WEIGHT: 126 LBS | SYSTOLIC BLOOD PRESSURE: 98 MMHG

## 2024-09-24 DIAGNOSIS — Z3A.37 37 WEEKS GESTATION OF PREGNANCY: ICD-10-CM

## 2024-09-24 DIAGNOSIS — Z36.89 ENCOUNTER FOR ULTRASOUND TO ASSESS FETAL GROWTH: ICD-10-CM

## 2024-09-24 DIAGNOSIS — O24.410 DIET CONTROLLED GESTATIONAL DIABETES MELLITUS (GDM) IN THIRD TRIMESTER: Primary | ICD-10-CM

## 2024-09-24 DIAGNOSIS — O98.512 COVID-19 AFFECTING PREGNANCY IN SECOND TRIMESTER: ICD-10-CM

## 2024-09-24 DIAGNOSIS — U07.1 COVID-19 AFFECTING PREGNANCY IN SECOND TRIMESTER: ICD-10-CM

## 2024-09-24 PROCEDURE — 99213 OFFICE O/P EST LOW 20 MIN: CPT | Performed by: OBSTETRICS & GYNECOLOGY

## 2024-09-24 PROCEDURE — 76816 OB US FOLLOW-UP PER FETUS: CPT | Performed by: OBSTETRICS & GYNECOLOGY

## 2024-09-24 NOTE — PROGRESS NOTES
"[facility]: Krysta Santos was seen today at 37w0d for fetal growth assessment ultrasound.  See ultrasound report under \"OB Procedures\" tab.  Please don't hesitate to contact our office with any concerns or questions.  -Glenna Dugan MD    "

## 2024-09-24 NOTE — LETTER
"  Date: 2024    Astrid Coulter MD  [address]  [address] [place] [id number]    Patient: Krysta Santos   YOB: 2000   Date of Visit: 2024   Gestational age 37w0d   Nature of this communication: Routine       This patient was seen recently in our  office.  Please see ultrasound report under \"OB Procedures\" tab.  Please don't hesitate to contact our office with any concerns or questions.      Sincerely,      Glenna Dugan MD  Attending Physician, Maternal-Fetal Medicine  [facility]       "

## 2024-09-24 NOTE — TELEPHONE ENCOUNTER
"Pt is 37w0d calling with multiple questions, primarily concerned with 2 episodes of spotting after perineal massage. Pt states perineal massage done in opening of vagina, not deep internally. Feels as if her partner accidentally scratched opening. Pt wiped area and bleeding stopped. Denies leaking of fluid, cramping, or additional vaginal bleeding. Pt notes positive fetal movement and otherwise feeling very well. RN advised partner to use a non-latex glove, or trim nails when applying perineal massage, and use of massage oils or lubricant to prevent friction. Pt also asking about course of labor and experiencing contractions. RN advised contractions will feel like muscle cramps that she may experience in her calves. They will come and go persistently and not let up. Should time them, and once they become regular and close together, should call office. Pt concerned due to not feeling any radha faith. Patient states other people who are far along as she is are feeling radha faith or are not feeling well in pregnancy, but she doesn't feel pregnant at all, and doing well. RN advised so long as patient feels baby move, does not have leaking of fluid, cramping, vaginal bleeding, and is feeling good - no need for concern. Keep hydrating and getting rest. Pt questioning if OK to continue Iron supplement regimen as CBC labs continue to run low. RN advised hemoglobin and hematocrit did rise since July labs. Keep regimen and continue to eat iron rich foods. Does not need to add supplements. Call back with any questions or new abnormal symptoms. Pt verbalized an understanding. No further questions.     Answer Assessment - Initial Assessment Questions  1. ONSET: \"When did this bleeding start?\"         2 days ago, then 3 days prior with  massage  2. DESCRIPTION: \"Describe the bleeding that you are having.\" \"How much bleeding is there?\"     - SPOTTING: spotting, or pinkish / brownish mucous discharge; does not fill " "panti-liner or pad     - MILD:  less than 1 pad / hour; less than patient's usual menstrual bleeding    - MODERATE: 1-2 pads / hour; 1 menstrual cup every 6 hours; small-medium blood clots (e.g., pea, grape, small coin)    - SEVERE: soaking 2 or more pads/hour for 2 or more hours; 1 menstrual cup every 2 hours; bleeding not contained by pads or continuous red blood from vagina; large blood clots (e.g., golf ball, large coin)       Spotting   3. ABDOMINAL PAIN SEVERITY: If present, ask: \"How bad is it?\"  (e.g., Scale 1-10; mild, moderate, or severe)    - MILD (1-3): doesn't interfere with normal activities, abdomen soft and not tender to touch     - MODERATE (4-7): interferes with normal activities or awakens from sleep, tender to touch     - SEVERE (8-10): excruciating pain, doubled over, unable to do any normal activities      Denies  4. PREGNANCY: \"Do you know how many weeks or months pregnant you are?\"       37w  5. MEREDITH: \"What date are you expecting to deliver?\"      10/15/24  6. FETAL MOVEMENT: \"Has the baby's movement decreased or changed significantly from normal?\"      Positive  7. HEMODYNAMIC STATUS: \"Are you weak or feeling lightheaded?\" If Yes, ask: \"Can you stand and walk normally?\"       Denies  8. OTHER SYMPTOMS: \"What other symptoms are you having with the bleeding?\" (e.g., leaking fluid from vagina, contractions)      Denies    Protocols used: Pregnancy - Vaginal Bleeding Greater Than 20 Weeks EGA-ADULT-[place]    "

## 2024-09-25 ENCOUNTER — DOCUMENTATION (OUTPATIENT)
Dept: PERINATAL CARE | Facility: CLINIC | Age: 24
End: 2024-09-25

## 2024-09-25 NOTE — PROGRESS NOTES
"  Date: 09/25/24  Krysta Santos  2000  Estimated Date of Delivery: 10/15/24  37w1d  OB/GYN:JERI    Diagnosis:  Diet controlled GDM    Blood Sugar Logs Submitted via: Glucose Flowsheet      Assessment and Plan:  No diabetes related medications  1800 calorie (CHO: 45-15-45-15-45-30) (PRO:2-1-3-1-3-2) meal plan consisting of 3 meals and 3 snacks daily including protein at each. Uses Isopure or PLNT protein shake for her mid-morning snack.   Advised patient to continue current meal plan  Avoid fasting for > 10 hours overnight  Continue SMBG 4 x per day (Fasting, 2 hour after start of each meal) with a Contour Next EZ glucose meter.   Walks outside daily or on treadmill & does pregnancy exercises.     Lab Work:   Lab Results   Component Value Date    HGBA1C 4.7 06/14/2024      Ultrasound:       Date:9/24/24       Fetal Growth: Normal       SVETA: Normal  Next: none    Diabetes Self Management Support Plan outside of ongoing care: Spouse/Family   Patient Stated Goal: \"I will plan my meals and snacks every day\"   Goal Assessment: On track    Date to report next: weekly     Eliza Arce, MS, RD, [facility]  Diabetes Educator  [facility] Maternal Fetal Medicine  Diabetes in Pregnancy Program  [address] [facility], [address]  [place] [address]    "

## 2024-09-30 ENCOUNTER — ROUTINE PRENATAL (OUTPATIENT)
Dept: OBGYN CLINIC | Facility: CLINIC | Age: 24
End: 2024-09-30
Payer: COMMERCIAL

## 2024-09-30 VITALS
DIASTOLIC BLOOD PRESSURE: 86 MMHG | HEART RATE: 93 BPM | OXYGEN SATURATION: 98 % | HEIGHT: 62 IN | WEIGHT: 124 LBS | SYSTOLIC BLOOD PRESSURE: 118 MMHG | BODY MASS INDEX: 22.82 KG/M2

## 2024-09-30 DIAGNOSIS — Z3A.37 37 WEEKS GESTATION OF PREGNANCY: ICD-10-CM

## 2024-09-30 DIAGNOSIS — F41.9 ANXIETY: ICD-10-CM

## 2024-09-30 DIAGNOSIS — O24.410 DIET CONTROLLED GESTATIONAL DIABETES MELLITUS (GDM) IN THIRD TRIMESTER: ICD-10-CM

## 2024-09-30 DIAGNOSIS — Z34.03 PRENATAL CARE, FIRST PREGNANCY IN THIRD TRIMESTER: Primary | ICD-10-CM

## 2024-09-30 LAB
SL AMB  POCT GLUCOSE, UA: NORMAL
SL AMB POCT URINE PROTEIN: NORMAL

## 2024-09-30 PROCEDURE — 99213 OFFICE O/P EST LOW 20 MIN: CPT | Performed by: OBSTETRICS & GYNECOLOGY

## 2024-09-30 NOTE — ASSESSMENT & PLAN NOTE
Increase in stress. Father has had a stroke, he is in [place]. Unsure prognosis  Increase in setraline requested, counseling, support services available if needed.     Signs of labor, fetal kick counts discussed.       
No indicators present

## 2024-09-30 NOTE — PROGRESS NOTES
37 weeks gestation of pregnancy  Increase in stress. Father has had a stroke, he is in [place]. Unsure prognosis  Increase in setraline requested, counseling, support services available if needed.     Signs of labor, fetal kick counts discussed.       Problem   Diet Controlled Gestational Diabetes Mellitus (Gdm) in Third Trimester    Delivery timing 39+0 to 40+6/7  If pregnant beyond EDC needs weekly NST+SVETA with MFM (needs to be scheduled)  PP 75g oral glucose tolerance test ordered     37w growth:  AC             32.12 cm        36 weeks 0 days* (36%)  BPD             9.03 cm        36 weeks 4 days* (54%)  HC             31.98 cm        36 weeks 0 days* (9%)  Femur           6.93 cm        35 weeks 4 days* (16%)    EFW Hadlock 4   2816 grams - 6 lbs 3 oz                 (30%)                       Patient is here for prenatal ob visit today.  GA: 37w6d  MEREDITH: 10/15/24    Denies LOF, CTX  +FM    Urine: Protein neg / Glucose neg  Labs utd     Delivery consent 24  Birth plan 24  Breast pump said they will send when she goes into labor   Peds referral not needed  RSV 24  Tdap 24  Rhogam 24  GBS positive 4/15 urine    Was having VB while doing perineal message but none besides that.  Pt requested cervical check

## 2024-10-02 ENCOUNTER — HOSPITAL ENCOUNTER (OUTPATIENT)
Facility: HOSPITAL | Age: 24
Discharge: HOME | DRG: 542 | End: 2024-10-02
Attending: OBSTETRICS & GYNECOLOGY | Admitting: OBSTETRICS & GYNECOLOGY
Payer: COMMERCIAL

## 2024-10-02 ENCOUNTER — NURSE TRIAGE (OUTPATIENT)
Dept: OTHER | Facility: OTHER | Age: 24
End: 2024-10-02

## 2024-10-02 VITALS
HEART RATE: 71 BPM | SYSTOLIC BLOOD PRESSURE: 109 MMHG | TEMPERATURE: 98.2 F | DIASTOLIC BLOOD PRESSURE: 77 MMHG | RESPIRATION RATE: 16 BRPM

## 2024-10-02 PROBLEM — O47.9 IRREGULAR CONTRACTIONS: Status: ACTIVE | Noted: 2024-10-02

## 2024-10-02 PROBLEM — Z3A.38 38 WEEKS GESTATION OF PREGNANCY: Status: ACTIVE | Noted: 2024-03-14

## 2024-10-02 PROCEDURE — 76815 OB US LIMITED FETUS(S): CPT

## 2024-10-02 PROCEDURE — 99214 OFFICE O/P EST MOD 30 MIN: CPT

## 2024-10-02 PROCEDURE — 99213 OFFICE O/P EST LOW 20 MIN: CPT | Performed by: OBSTETRICS & GYNECOLOGY

## 2024-10-02 PROCEDURE — 76815 OB US LIMITED FETUS(S): CPT | Performed by: OBSTETRICS & GYNECOLOGY

## 2024-10-02 RX ADMIN — SODIUM CHLORIDE 1000 ML: 0.9 INJECTION, SOLUTION INTRAVENOUS at 20:34

## 2024-10-02 NOTE — PROGRESS NOTES
L&D Triage Note - OB/GYN  Krysta Santos 24 y.o. female MRN: [id number]  Unit/Bed#:  TRIAGE  Encounter: [id number]      ASSESSMENT:  Krysta Santos is a 24 y.o.  at 38w1d who was evaluated today for decreased fetal movement. NST is Reactive and SVETA within normal limits with fetal movement seen on ultrasound. Patient feeling fetal movement in triage. Safe for discharge to home with return precautions reviewed.    PLAN:    1) Decreased Fetal Movement  - SVETA: 12 cm  - NST Reactive   - Patient feeling movement in triage  - Fetal movement visualized on ultrasound     38w1d weeks gestation of pregnancy  - Continue routine prenatal care  - Discharge from OB triage with labor precautions    - Reviewed rupture of membranes, false vs true labor, decreased fetal movement, and vaginal bleeding   - Pt to call provider with any concerns and follow up at her next scheduled prenatal appointment    - Case discussed with Dr. Camilla eKn MD  OB/GYN PGY-1  10/2/2024 8:57 PM    SUBJECTIVE:    Krysta Santos 24 y.o.  at 38w1d with an Estimated Date of Delivery: 10/15/24 presenting with decreased fetal movement. Her father passed away today and says that she may not have been paying close attention to the baby's movement, but since she doesn't recall feeling the baby move since yesterday she presented to triage today.   Patient denies vaginal bleeding.  Patient complains of uterine contractions, occurring every 3-4 min irregularly minutes  Patient denies recent trauma, recent intercourse/objects per vagina, inadequate hydration, vaginal discharge, genital lesions, and s/sx of infection  Patient does not have headache, blurry vision, epigastric pain, or edema  Fetal movement is normal    Her current pregnancy is notable for Rubella NI, GDMA1, GBS bacteriuria  Her obstetrical history is significant for gestational DM and class   DM A1    ROS:  General: Denies: fever, chills, weakness,  fatigue  Cardiovascular: negative for chest pain, chest pressure/discomfort, dyspnea, palpitations  HEENT: Denies blurry vision, denies headache  Pulmonary: Denies cough, shortness of breath or dyspnea on exertion  GI: Denies: abdominal pain, flank pain, nausea, vomiting, diarrhea, constipation  : Denies urinary frequency, hematuria, urinary hesitancy, and urinary retention    OBJECTIVE  Vitals:   Vitals:    10/02/24 1842   BP: 109/77   Pulse: 71   Resp: 16   Temp: 98.2 °F (36.8 °C)     There is no height or weight on file to calculate BMI.  GBS: Positive  Blood type: A negative  Estimated Date of Delivery: 10/15/24    General Physical Exam:  General: in no apparent distress, well developed and well nourished, non-toxic, in no respiratory distress and acyanotic, and alert  Cardiovascular: intact distals pulses  Lungs: non-labored breathing  Abdomen: Soft, Non-tender, Gravid  Lower extremities: nontender    Speculum:   SVE: 1 / 50% / -3    Fetal monitoring:  Fetal heart rate: Baseline Rate (FHR): 125 bpm  Variability: Moderate  Accelerations: 15 x 15 or greater, At variable times  Decelerations: None  FHR Category: Category I  [place]: Contraction Frequency (minutes): 2-3  Contraction Duration (seconds): 50-60  Contraction Intensity: Mild    Imaging:      Abd. US   SVETA      - Q1 2.5 cm     - Q2 5.1 cm     - Q3 2.5 cm     - Q4 4.84 cm     - Total: 12 cm   Presentation: anterior                  Labs: No results found for this or any previous visit (from the past 24 hour(s)).

## 2024-10-03 ENCOUNTER — ANESTHESIA (INPATIENT)
Dept: ANESTHESIOLOGY | Facility: HOSPITAL | Age: 24
DRG: 542 | End: 2024-10-03
Payer: COMMERCIAL

## 2024-10-03 ENCOUNTER — HOSPITAL ENCOUNTER (INPATIENT)
Facility: HOSPITAL | Age: 24
LOS: 3 days | Discharge: HOME | DRG: 542 | End: 2024-10-06
Attending: STUDENT IN AN ORGANIZED HEALTH CARE EDUCATION/TRAINING PROGRAM | Admitting: STUDENT IN AN ORGANIZED HEALTH CARE EDUCATION/TRAINING PROGRAM
Payer: COMMERCIAL

## 2024-10-03 ENCOUNTER — NURSE TRIAGE (OUTPATIENT)
Age: 24
End: 2024-10-03

## 2024-10-03 ENCOUNTER — NURSE TRIAGE (OUTPATIENT)
Dept: OTHER | Facility: OTHER | Age: 24
End: 2024-10-03

## 2024-10-03 ENCOUNTER — ANESTHESIA EVENT (INPATIENT)
Dept: ANESTHESIOLOGY | Facility: HOSPITAL | Age: 24
DRG: 542 | End: 2024-10-03
Payer: COMMERCIAL

## 2024-10-03 ENCOUNTER — PATIENT MESSAGE (OUTPATIENT)
Dept: PERINATAL CARE | Facility: CLINIC | Age: 24
End: 2024-10-03

## 2024-10-03 ENCOUNTER — HOSPITAL ENCOUNTER (OUTPATIENT)
Facility: HOSPITAL | Age: 24
Discharge: HOME | DRG: 542 | End: 2024-10-03
Attending: OBSTETRICS & GYNECOLOGY | Admitting: OBSTETRICS & GYNECOLOGY
Payer: COMMERCIAL

## 2024-10-03 VITALS
TEMPERATURE: 98.6 F | RESPIRATION RATE: 16 BRPM | HEART RATE: 68 BPM | DIASTOLIC BLOOD PRESSURE: 78 MMHG | SYSTOLIC BLOOD PRESSURE: 127 MMHG

## 2024-10-03 DIAGNOSIS — Z3A.38 38 WEEKS GESTATION OF PREGNANCY: Primary | ICD-10-CM

## 2024-10-03 DIAGNOSIS — F41.9 ANXIETY: ICD-10-CM

## 2024-10-03 PROBLEM — N50.89 LEAKING SEMINAL FLUID: Status: ACTIVE | Noted: 2024-10-03

## 2024-10-03 PROBLEM — O42.90 AMNIOTIC FLUID LEAKING: Status: ACTIVE | Noted: 2024-10-03

## 2024-10-03 LAB
ERYTHROCYTE [DISTWIDTH] IN BLOOD BY AUTOMATED COUNT: 13.2 % (ref 11.6–15.1)
GLUCOSE SERPL-MCNC: 145 MG/DL (ref 65–140)
GLUCOSE SERPL-MCNC: 156 MG/DL (ref 65–140)
HCT VFR BLD AUTO: 37 % (ref 34.8–46.1)
HGB BLD-MCNC: 12.1 G/DL (ref 11.5–15.4)
HOLD SPECIMEN: NORMAL
MCH RBC QN AUTO: 29.9 PG (ref 26.8–34.3)
MCHC RBC AUTO-ENTMCNC: 32.7 G/DL (ref 31.4–37.4)
MCV RBC AUTO: 91 FL (ref 82–98)
PLATELET # BLD AUTO: 192 THOUSANDS/UL (ref 149–390)
PMV BLD AUTO: 10.9 FL (ref 8.9–12.7)
RBC # BLD AUTO: 4.05 MILLION/UL (ref 3.81–5.12)
WBC # BLD AUTO: 13.17 THOUSAND/UL (ref 4.31–10.16)

## 2024-10-03 PROCEDURE — NC001 PR NO CHARGE: Performed by: OBSTETRICS & GYNECOLOGY

## 2024-10-03 PROCEDURE — 99213 OFFICE O/P EST LOW 20 MIN: CPT

## 2024-10-03 PROCEDURE — 85027 COMPLETE CBC AUTOMATED: CPT

## 2024-10-03 PROCEDURE — 99214 OFFICE O/P EST MOD 30 MIN: CPT

## 2024-10-03 PROCEDURE — NC001 PR NO CHARGE: Performed by: STUDENT IN AN ORGANIZED HEALTH CARE EDUCATION/TRAINING PROGRAM

## 2024-10-03 PROCEDURE — 82948 REAGENT STRIP/BLOOD GLUCOSE: CPT

## 2024-10-03 PROCEDURE — 4A1HXCZ MONITORING OF PRODUCTS OF CONCEPTION, CARDIAC RATE, EXTERNAL APPROACH: ICD-10-PCS | Performed by: STUDENT IN AN ORGANIZED HEALTH CARE EDUCATION/TRAINING PROGRAM

## 2024-10-03 RX ORDER — LIDOCAINE HYDROCHLORIDE AND EPINEPHRINE 15; 5 MG/ML; UG/ML
INJECTION, SOLUTION EPIDURAL AS NEEDED
Status: DISCONTINUED | OUTPATIENT
Start: 2024-10-03 | End: 2024-10-09 | Stop reason: HOSPADM

## 2024-10-03 RX ORDER — BUPIVACAINE HYDROCHLORIDE 2.5 MG/ML
30 INJECTION, SOLUTION EPIDURAL; INFILTRATION; INTRACAUDAL ONCE AS NEEDED
Status: DISCONTINUED | OUTPATIENT
Start: 2024-10-03 | End: 2024-10-04

## 2024-10-03 RX ORDER — BUPIVACAINE HYDROCHLORIDE 7.5 MG/ML
INJECTION, SOLUTION INTRASPINAL AS NEEDED
Status: DISCONTINUED | OUTPATIENT
Start: 2024-10-03 | End: 2024-10-09 | Stop reason: HOSPADM

## 2024-10-03 RX ORDER — ONDANSETRON 2 MG/ML
4 INJECTION INTRAMUSCULAR; INTRAVENOUS ONCE
Status: COMPLETED | OUTPATIENT
Start: 2024-10-03 | End: 2024-10-03

## 2024-10-03 RX ORDER — ROPIVACAINE HYDROCHLORIDE 2 MG/ML
INJECTION, SOLUTION EPIDURAL; INFILTRATION; PERINEURAL CONTINUOUS PRN
Status: DISCONTINUED | OUTPATIENT
Start: 2024-10-03 | End: 2024-10-09 | Stop reason: HOSPADM

## 2024-10-03 RX ORDER — SODIUM CHLORIDE, SODIUM LACTATE, POTASSIUM CHLORIDE, CALCIUM CHLORIDE 600; 310; 30; 20 MG/100ML; MG/100ML; MG/100ML; MG/100ML
125 INJECTION, SOLUTION INTRAVENOUS CONTINUOUS
Status: DISCONTINUED | OUTPATIENT
Start: 2024-10-03 | End: 2024-10-03

## 2024-10-03 RX ORDER — FAMOTIDINE 20 MG/1
20 TABLET, FILM COATED ORAL 2 TIMES DAILY
Status: DISCONTINUED | OUTPATIENT
Start: 2024-10-04 | End: 2024-10-04

## 2024-10-03 RX ORDER — CALCIUM CARBONATE 500 MG/1
1000 TABLET, CHEWABLE ORAL DAILY PRN
Status: DISCONTINUED | OUTPATIENT
Start: 2024-10-03 | End: 2024-10-04 | Stop reason: SDUPTHER

## 2024-10-03 RX ORDER — HYDROMORPHONE HYDROCHLORIDE 2 MG/ML
2 INJECTION, SOLUTION INTRAMUSCULAR; INTRAVENOUS; SUBCUTANEOUS ONCE
Status: COMPLETED | OUTPATIENT
Start: 2024-10-03 | End: 2024-10-03

## 2024-10-03 RX ORDER — DIPHENHYDRAMINE HYDROCHLORIDE 50 MG/ML
25 INJECTION INTRAMUSCULAR; INTRAVENOUS ONCE
Status: DISCONTINUED | OUTPATIENT
Start: 2024-10-03 | End: 2024-10-03

## 2024-10-03 RX ORDER — ONDANSETRON 2 MG/ML
4 INJECTION INTRAMUSCULAR; INTRAVENOUS EVERY 6 HOURS PRN
Status: DISCONTINUED | OUTPATIENT
Start: 2024-10-03 | End: 2024-10-04

## 2024-10-03 RX ORDER — OXYTOCIN/RINGER'S LACTATE 30/500 ML
PLASTIC BAG, INJECTION (ML) INTRAVENOUS
Status: DISPENSED
Start: 2024-10-03 | End: 2024-10-04

## 2024-10-03 RX ORDER — DIPHENHYDRAMINE HYDROCHLORIDE 50 MG/ML
50 INJECTION INTRAMUSCULAR; INTRAVENOUS ONCE
Status: COMPLETED | OUTPATIENT
Start: 2024-10-03 | End: 2024-10-03

## 2024-10-03 RX ORDER — SODIUM CHLORIDE 9 MG/ML
125 INJECTION, SOLUTION INTRAVENOUS CONTINUOUS
Status: DISCONTINUED | OUTPATIENT
Start: 2024-10-03 | End: 2024-10-04

## 2024-10-03 RX ADMIN — HYDROMORPHONE HYDROCHLORIDE 2 MG: 2 INJECTION, SOLUTION INTRAMUSCULAR; INTRAVENOUS; SUBCUTANEOUS at 11:40

## 2024-10-03 RX ADMIN — SODIUM CHLORIDE 125 ML/HR: 0.9 INJECTION, SOLUTION INTRAVENOUS at 22:21

## 2024-10-03 RX ADMIN — ROPIVACAINE HYDROCHLORIDE: 2 INJECTION, SOLUTION EPIDURAL; INFILTRATION at 22:25

## 2024-10-03 RX ADMIN — BUPIVACAINE HYDROCHLORIDE IN DEXTROSE 1.4 ML: 7.5 INJECTION, SOLUTION SUBARACHNOID at 21:55

## 2024-10-03 RX ADMIN — ONDANSETRON 4 MG: 2 INJECTION INTRAMUSCULAR; INTRAVENOUS at 11:18

## 2024-10-03 RX ADMIN — LIDOCAINE HYDROCHLORIDE AND EPINEPHRINE 3 ML: 15; 5 INJECTION, SOLUTION EPIDURAL at 22:11

## 2024-10-03 RX ADMIN — ROPIVACAINE HYDROCHLORIDE 10 ML/HR: 2 INJECTION EPIDURAL; INFILTRATION; PERINEURAL at 22:15

## 2024-10-03 RX ADMIN — CALCIUM CARBONATE 1000 MG: 500 TABLET, CHEWABLE ORAL at 23:00

## 2024-10-03 RX ADMIN — PENICILLIN G POTASSIUM 5 MILLION UNITS: 20000000 INJECTION, POWDER, FOR SOLUTION INTRAVENOUS at 21:40

## 2024-10-03 RX ADMIN — DIPHENHYDRAMINE HYDROCHLORIDE 50 MG: 50 INJECTION, SOLUTION INTRAMUSCULAR; INTRAVENOUS at 11:41

## 2024-10-03 RX ADMIN — LIDOCAINE HYDROCHLORIDE AND EPINEPHRINE 2 ML: 15; 5 INJECTION, SOLUTION EPIDURAL at 22:15

## 2024-10-03 RX ADMIN — SODIUM CHLORIDE, SODIUM LACTATE, POTASSIUM CHLORIDE, AND CALCIUM CHLORIDE 1000 ML: .6; .31; .03; .02 INJECTION, SOLUTION INTRAVENOUS at 11:19

## 2024-10-03 RX ADMIN — SODIUM CHLORIDE 999 ML/HR: 0.9 INJECTION, SOLUTION INTRAVENOUS at 21:35

## 2024-10-03 NOTE — PROCEDURES
Krysta Santos, a  at 38w1d with an MEREDITH of 10/15/2024, by Last Menstrual Period, was seen at [facility] LABOR AND DELIVERY for the following procedure(s): $Procedure Type: SVETA]         4 Quadrant SVETA  SVETA Q1 (cm): 2.5 cm  SVETA Q2 (cm): 5.1 cm  SVETA Q3 (cm): 2.7 cm  SVETA Q4 (cm): 1.7 cm  SVETA TOTAL (cm): 12 cm

## 2024-10-03 NOTE — TELEPHONE ENCOUNTER
"Reason for Disposition   [1] First baby (primipara) AND [2] contractions < 6 minutes apart  AND [3] present 2 hours    Answer Assessment - Initial Assessment Questions  1. ONSET: \"When did the symptoms begin?\"         2 days ago and went to L&D both days and was discharged  2. CONTRACTIONS: \"Describe the contractions that you are having.\" (e.g., duration, frequency, regularity, severity)      5-8 minutes apart, rectal and back pain, lasting 40-50 seconds, 10/10 pain when contractions coming  3. PREGNANCY: \"How many weeks pregnant are you?\"      38 weeks 2 days  4. MEREDITH: \"What date are you expecting to deliver?\"      10/15/2024  5. PARITY: \"Have you had a baby before?\" If Yes, ask: \"How long did the labor last?\"      , first child  6. FETAL MOVEMENT: \"Has the baby's movement decreased or changed significantly from normal?\"      Moving normally  7. OTHER SYMPTOMS: \"Do you have any other symptoms?\" (e.g., leaking fluid from vagina, vaginal bleeding, fever, hand/facial swelling)      Blood and mucus vaginal discharge started today, when contractions come patient feels hot, no edema.    Protocols used: Pregnancy - Labor-Adult-AH    "

## 2024-10-03 NOTE — TELEPHONE ENCOUNTER
Patient and  on phone 38 weeks 2 days EDC 10/15/24 L&D last night .Patient just had bloody show brownish red mucous  Denies any ROM.Contractions on and off all night some 5 minutes apart lasting 40-50 sec some are coming sooner but those are quick in duration.Pain 7-8/10. Baby is moving well. Very nauseated. Patient asking doctor if she should come back to be re-evaluated since she was sent home last night.  ESC Dr. Spence  ESC from ALINE Maier RN-tell her to come into triage to be evaluated   Phone call back to patient, ETA 1 hour.   Reason for Disposition   Contractions < 10 minutes apart for 1 hour (i.e., 6 or more contractions an hour)    Protocols used: Pregnancy - Labor - -ADULT-[place]

## 2024-10-03 NOTE — ASSESSMENT & PLAN NOTE
Lab Results   Component Value Date    HGBA1C 4.7 06/14/2024     EFW Hadlock 4 2816 grams - 6 lbs 3 oz (30%) at 37w0d  F/u postpartum for 2hr glucola

## 2024-10-03 NOTE — TELEPHONE ENCOUNTER
"Reason for Disposition   [1] Pregnant 23 or more weeks AND [2] baby moving less today by kick count  (e.g., kick count < 5 in 1 hour or < 10 in 2 hours)    Answer Assessment - Initial Assessment Questions  1. FETAL MOVEMENT: \"Has the baby's movement decreased or changed significantly from normal?\" (e.g., yes, no; describe)      Decreased, last felt the baby move last night    2. MEREDITH: \"What date are you expecting to deliver?\"       10/15/24    3. PREGNANCY: \"How many weeks pregnant are you?\"       38 weeks 1 day    4. OTHER SYMPTOMS: \"Do you have any other symptoms?\" (e.g., abdominal pain, leaking fluid from vagina, vaginal bleeding, etc.)      Pressure on rectum when sitting down and when walking feel pressure in lower belly, tightening of the stomach, not very regular, gets very hard, stomach changed shape, mild lower back pain    Protocols used: Pregnancy - Decreased Fetal Movement-ADULT-AH    "
Seen in L&D  
Yes

## 2024-10-03 NOTE — PROGRESS NOTES
L&D Triage Note - OB/GYN  Krysta Santos 24 y.o. female MRN: [id number]  Unit/Bed#:  TRIAGE  Encounter: [id number]    Patient is seen by JERI.    ASSESSMENT  Krysta Santos is a 24 y.o.  at 38w2d here for r/o labor.     PLAN  #1. Uterine contractions:   Q2-3 on toco  NST reactive  SVE .-2, from 1/50/-3  Plan for IVF, zofran, 2-hour recheck    D/w Dr. Spence.  ______________    SUBJECTIVE    MEREDITH: Estimated Date of Delivery: 10/15/24    HPI:  24 y.o.  38w2d presents with complaint of painful uterine contractions. Describes contractions that started overnight and picked up in frequency and strength. She also has noticed some bloody mucus discharge. Other symptoms include nausea and vomiting which started with her contractions. She denies LOF or DFM.      Contractions: as above  Leakage of fluid: denies  Vaginal Bleeding: as above  Fetal movement: present    Her obstetrical history is not significant; this is her first pregnancy.    ROS:  Constitutional: Negative  Respiratory: Negative  Cardiovascular: Negative    Gastrointestinal: +nausea, vomiting    OBJECTIVE:    Vitals:  /79   Pulse 90   Temp 98.6 °F (37 °C) (Oral)   Resp 16   LMP 2024 (Exact Date)   There is no height or weight on file to calculate BMI.    Physical Exam  Constitutional:       Comments: Appears uncomfortable   Cardiovascular:      Rate and Rhythm: Normal rate.   Pulmonary:      Effort: Pulmonary effort is normal.   Abdominal:      Comments: Gravid, non-tender   Genitourinary:     General: Normal vulva.   Skin:     General: Skin is warm and dry.   Neurological:      General: No focal deficit present.      Mental Status: She is alert and oriented to person, place, and time.   Psychiatric:         Mood and Affect: Mood normal.         Behavior: Behavior normal.       SVE: .-2  Presentation: Vertex  Position: Unknown  Method: Manual  OB Examiner: Tarsha    FHT:  Baseline Rate (FHR): 130 bpm  Variability:  Moderate  Accelerations: 15 x 15 or greater, At variable times    TOCO:   Contraction Frequency (minutes): 2-3    Labs: No results found for this or any previous visit (from the past 24 hour(s)).      Jael Willingham MD  10/3/2024  11:11 AM

## 2024-10-03 NOTE — H&P
H & P- Obstetrics   Krysta Santos 24 y.o. female MRN: [id number]  Unit/Bed#: LD TRIAGE  Encounter: [id number]    Assessment: 24 y.o.  at 38w2d admitted for {scmobprob:61573}.  SVE: ***  FHT: ***  Clinical EFW: *** ; Cephalic confirmed by ***  GBS status: ***   Postpartum contraception plan: ***    Plan:   Anemia during pregnancy in third trimester  Assessment & Plan  F/u admission HgB    Diet controlled gestational diabetes mellitus (GDM) in third trimester  Assessment & Plan    Lab Results   Component Value Date    HGBA1C 4.7 2024     EFW Hadlock 4 2816 grams - 6 lbs 3 oz (30%) at 37w0d  F/u postpartum for 2hr glucola    Rh negative state in antepartum period  Assessment & Plan  Rhogam postpartum    Rubella non-immune status, antepartum  Assessment & Plan  Offer MMR postpartum    38 weeks gestation of pregnancy  Assessment & Plan  PNL s/f elevated glucola otherwise wnl  A negative  GBS positive    EFW Hadlock 4 2816 grams - 6 lbs 3 oz (30%) at 37w0d           Discussed case and plan w/ . ***      Chief Complaint: {scmobyo:96037}    HPI: Krysta Santos is a 24 y.o.  with an MEREDITH of 10/15/2024, by Last Menstrual Period at 38w2d who is being admitted for {scmobprob:77644}. She {gen contractions:288059}, has {scmLOF:03834}, and reports {scmobvb:74096}. She {scmfm:67492}    Patient Active Problem List   Diagnosis    38 weeks gestation of pregnancy    Nausea/vomiting in pregnancy    Group B streptococcal bacteriuria    Rubella non-immune status, antepartum    Rh negative state in antepartum period    Diet controlled gestational diabetes mellitus (GDM) in third trimester    Anemia during pregnancy in third trimester    COVID-19 affecting pregnancy in second trimester    Anxiety    Irregular contractions       Baby complications/comments: none***    Review of Systems    OB Hx:  OB History    Para Term  AB Living   1             SAB IAB Ectopic Multiple Live Births                  #  Outcome Date GA Lbr Mc/2nd Weight Sex Type Anes PTL Lv   1 Current                Past Medical Hx:  Past Medical History:   Diagnosis Date    Anxiety     specifically about nausea in pregnancy and labor    Depression     discontinued medications in pregnancy    Family history of lupus anticoagulant disorder     father of patient    Family history of MTHFR deficiency     Father of patient-    Varicella 2004    had disease       Past Surgical hx:  Past Surgical History:   Procedure Laterality Date    LYMPHANGIOMA EXCISION         Social Hx:  Alcohol use: ***  Tobacco use: ***  Other substance use: ***    Other: ***    No Known Allergies      Medications Prior to Admission:     Blood Glucose Monitoring Suppl (Contour Next Gen Monitor) w/Device KIT    Contour Next Test test strip    ferrous sulfate 324 (65 Fe) mg    Microlet Lancets MISC    ondansetron (ZOFRAN-ODT) 4 mg disintegrating tablet    pantoprazole (PROTONIX) 40 mg tablet    Blywbq-QnVnq-PsSez-FA-CA-Omega (Complete  DHA) 29-1-200 & 200 MG MISC    Prenatal MV-Min-Fe Fum-FA-DHA (Prenatal+DHA) 28-0.975 & 200 MG MISC    sertraline (Zoloft) 50 mg tablet    Objective:  Temp:  [98.2 °F (36.8 °C)-98.6 °F (37 °C)] 98.6 °F (37 °C)  HR:  [71-90] 90  BP: (109-121)/(77-79) 121/79  Resp:  [16] 16  There is no height or weight on file to calculate BMI.     Physical Exam:  OBGyn Exam       FHT:  Baseline Rate (FHR): 130 bpm  Variability: Moderate  Accelerations: 15 x 15 or greater, At variable times    TOCO:   Contraction Frequency (minutes): 2-3    Lab Results   Component Value Date    WBC 6.62 2024    HGB 10.9 (L) 2024    HCT 34.1 (L) 2024     2024     Lab Results   Component Value Date    K 4.0 2024     2024    CO2 25 2024    BUN 7 2024    CREATININE 0.59 2024    AST 20 2024    ALT 22 2024     Prenatal Labs: Reviewed      Blood type: A negative  Antibody: negative  GBS:  negative  HIV: non-reactive  Rubella: non-immune  Syphilis IgM/IgG: non-reactive  HBsAg: non-reactive  HCAb: non-reactive  Chlamydia: negative  Gonorrhea: negative  Diabetes 1 hour screen: elevated  3 hour glucose: elevated  Platelets: ***  Hgb: ***  {Expected LOS:26727}  {Admission Status:61255}    Signature/Title: Jael Willingham MD  Date: 10/3/2024  Time: 11:26 AM

## 2024-10-03 NOTE — ASSESSMENT & PLAN NOTE
PNL s/f elevated glucola otherwise wnl  A negative  GBS positive    EFW Hadlock 4 2816 grams - 6 lbs 3 oz (30%) at 37w0d

## 2024-10-03 NOTE — PROGRESS NOTES
Patient reassessed after another two hours. She reports that her contractions have spaced out tremendously - she's only feeling them every 7 minutes - and that they are not as strong as they were before. She is able to bounce on the birthing ball and is comfortable having a conversation. NST reactive, contractions still frequent on tocometry q1-2, but patient not feeling them. SVE with minimal change to 3/70/-2. At this time, patient opts to return home and return if contractions worsen from this new baseline. Given strict return precautions, including contractions increasing in strength/frequency, loss of fluid, vaginal bleeding, and decreased fetal movement.     D/w Dr. Spence.     Jael Willingham MD  PGY-2 OBGYN

## 2024-10-03 NOTE — PROGRESS NOTES
Patient reassessed after 2 hours of therapeutic rest; patient had requested something for pain after initial evaluation and subsequently received dilaudid/benadryl. She was able to sleep and reports that her contractions have spaced out but they are still just as strong as before. SVE with minimal change, now 2.5/70/-2. Discussed with patient that this is likely early labor but because she is not yet 39 weeks we cannot augment unless she continues to make change. Offered another 2-hour recheck to assess progress of possible labor vs return home - opted for 2hr recheck. Continue to monitor closely.    D/w Dr. Spence.     Jael Willingham MD  PGY-2 OBGYN

## 2024-10-04 LAB
ABO GROUP BLD: NORMAL
BASE EXCESS BLDCOV CALC-SCNC: -7.6 MMOL/L (ref 1–9)
BLD GP AB SCN SERPL QL: POSITIVE
BLOOD GROUP ANTIBODIES SERPL: NORMAL
DME PARACHUTE DELIVERY DATE ACTUAL: NORMAL
DME PARACHUTE DELIVERY DATE REQUESTED: NORMAL
DME PARACHUTE ITEM DESCRIPTION: NORMAL
DME PARACHUTE ORDER STATUS: NORMAL
DME PARACHUTE SUPPLIER NAME: NORMAL
DME PARACHUTE SUPPLIER PHONE: NORMAL
FETAL CELL SCN BLD QL ROSETTE: NEGATIVE
GLUCOSE SERPL-MCNC: 117 MG/DL (ref 65–140)
HCO3 BLDCOV-SCNC: 18 MMOL/L (ref 12.2–28.6)
OXYHGB MFR BLDCOV: 75.4 %
PCO2 BLDCOV: 37 MM HG (ref 27–43)
PH BLDCOV: 7.3 [PH] (ref 7.19–7.49)
PO2 BLDCOV: 31 MM HG (ref 15–45)
RH BLD: NEGATIVE
SAO2 % BLDCOV: 16.4 ML/DL
TREPONEMA PALLIDUM IGG+IGM AB [PRESENCE] IN SERUM OR PLASMA BY IMMUNOASSAY: NORMAL

## 2024-10-04 PROCEDURE — 82805 BLOOD GASES W/O2 SATURATION: CPT | Performed by: STUDENT IN AN ORGANIZED HEALTH CARE EDUCATION/TRAINING PROGRAM

## 2024-10-04 PROCEDURE — 86900 BLOOD TYPING SEROLOGIC ABO: CPT | Performed by: STUDENT IN AN ORGANIZED HEALTH CARE EDUCATION/TRAINING PROGRAM

## 2024-10-04 PROCEDURE — 59409 OBSTETRICAL CARE: CPT | Performed by: STUDENT IN AN ORGANIZED HEALTH CARE EDUCATION/TRAINING PROGRAM

## 2024-10-04 PROCEDURE — 86850 RBC ANTIBODY SCREEN: CPT | Performed by: STUDENT IN AN ORGANIZED HEALTH CARE EDUCATION/TRAINING PROGRAM

## 2024-10-04 PROCEDURE — 0DQR0ZZ REPAIR ANAL SPHINCTER, OPEN APPROACH: ICD-10-PCS | Performed by: STUDENT IN AN ORGANIZED HEALTH CARE EDUCATION/TRAINING PROGRAM

## 2024-10-04 PROCEDURE — 82948 REAGENT STRIP/BLOOD GLUCOSE: CPT

## 2024-10-04 PROCEDURE — NC001 PR NO CHARGE: Performed by: OBSTETRICS & GYNECOLOGY

## 2024-10-04 PROCEDURE — 86870 RBC ANTIBODY IDENTIFICATION: CPT | Performed by: STUDENT IN AN ORGANIZED HEALTH CARE EDUCATION/TRAINING PROGRAM

## 2024-10-04 PROCEDURE — 86780 TREPONEMA PALLIDUM: CPT | Performed by: STUDENT IN AN ORGANIZED HEALTH CARE EDUCATION/TRAINING PROGRAM

## 2024-10-04 PROCEDURE — 86901 BLOOD TYPING SEROLOGIC RH(D): CPT | Performed by: STUDENT IN AN ORGANIZED HEALTH CARE EDUCATION/TRAINING PROGRAM

## 2024-10-04 PROCEDURE — 85461 HEMOGLOBIN FETAL: CPT | Performed by: STUDENT IN AN ORGANIZED HEALTH CARE EDUCATION/TRAINING PROGRAM

## 2024-10-04 RX ORDER — OXYTOCIN/RINGER'S LACTATE 30/500 ML
250 PLASTIC BAG, INJECTION (ML) INTRAVENOUS ONCE
Status: CANCELLED | OUTPATIENT
Start: 2024-10-04 | End: 2024-10-04

## 2024-10-04 RX ORDER — ACETAMINOPHEN 325 MG/1
975 TABLET ORAL EVERY 6 HOURS
Status: CANCELLED | OUTPATIENT
Start: 2024-10-04

## 2024-10-04 RX ORDER — SIMETHICONE 80 MG
80 TABLET,CHEWABLE ORAL 4 TIMES DAILY PRN
Status: DISCONTINUED | OUTPATIENT
Start: 2024-10-04 | End: 2024-10-06 | Stop reason: HOSPADM

## 2024-10-04 RX ORDER — CALCIUM CARBONATE 500 MG/1
1000 TABLET, CHEWABLE ORAL DAILY PRN
Status: CANCELLED | OUTPATIENT
Start: 2024-10-04

## 2024-10-04 RX ORDER — DIPHENHYDRAMINE HCL 25 MG
25 TABLET ORAL EVERY 6 HOURS PRN
Status: CANCELLED | OUTPATIENT
Start: 2024-10-04

## 2024-10-04 RX ORDER — SIMETHICONE 80 MG
80 TABLET,CHEWABLE ORAL 4 TIMES DAILY PRN
Status: CANCELLED | OUTPATIENT
Start: 2024-10-04

## 2024-10-04 RX ORDER — DOCUSATE SODIUM 100 MG/1
100 CAPSULE, LIQUID FILLED ORAL 2 TIMES DAILY
Status: DISCONTINUED | OUTPATIENT
Start: 2024-10-04 | End: 2024-10-06 | Stop reason: HOSPADM

## 2024-10-04 RX ORDER — ONDANSETRON 2 MG/ML
4 INJECTION INTRAMUSCULAR; INTRAVENOUS EVERY 8 HOURS PRN
Status: DISCONTINUED | OUTPATIENT
Start: 2024-10-04 | End: 2024-10-04 | Stop reason: SDUPTHER

## 2024-10-04 RX ORDER — ONDANSETRON 2 MG/ML
4 INJECTION INTRAMUSCULAR; INTRAVENOUS EVERY 8 HOURS PRN
Status: CANCELLED | OUTPATIENT
Start: 2024-10-04

## 2024-10-04 RX ORDER — SENNOSIDES 8.6 MG
1 TABLET ORAL DAILY PRN
Status: CANCELLED | OUTPATIENT
Start: 2024-10-04

## 2024-10-04 RX ORDER — DIPHENHYDRAMINE HYDROCHLORIDE 50 MG/ML
25 INJECTION INTRAMUSCULAR; INTRAVENOUS EVERY 6 HOURS PRN
Status: DISCONTINUED | OUTPATIENT
Start: 2024-10-04 | End: 2024-10-06 | Stop reason: HOSPADM

## 2024-10-04 RX ORDER — ACETAMINOPHEN 325 MG/1
650 TABLET ORAL EVERY 4 HOURS PRN
Status: DISCONTINUED | OUTPATIENT
Start: 2024-10-04 | End: 2024-10-06 | Stop reason: HOSPADM

## 2024-10-04 RX ORDER — DOCUSATE SODIUM 100 MG/1
100 CAPSULE, LIQUID FILLED ORAL 2 TIMES DAILY
Status: CANCELLED | OUTPATIENT
Start: 2024-10-04

## 2024-10-04 RX ORDER — IBUPROFEN 600 MG/1
600 TABLET, FILM COATED ORAL EVERY 6 HOURS PRN
Status: CANCELLED | OUTPATIENT
Start: 2024-10-04

## 2024-10-04 RX ORDER — METOCLOPRAMIDE HYDROCHLORIDE 5 MG/ML
10 INJECTION INTRAMUSCULAR; INTRAVENOUS EVERY 6 HOURS PRN
Status: CANCELLED | OUTPATIENT
Start: 2024-10-04

## 2024-10-04 RX ORDER — IBUPROFEN 600 MG/1
600 TABLET, FILM COATED ORAL EVERY 6 HOURS
Status: DISCONTINUED | OUTPATIENT
Start: 2024-10-04 | End: 2024-10-06 | Stop reason: HOSPADM

## 2024-10-04 RX ORDER — BENZOCAINE/MENTHOL 6 MG-10 MG
1 LOZENGE MUCOUS MEMBRANE DAILY PRN
Status: DISCONTINUED | OUTPATIENT
Start: 2024-10-04 | End: 2024-10-06 | Stop reason: HOSPADM

## 2024-10-04 RX ORDER — CALCIUM CARBONATE 500 MG/1
1000 TABLET, CHEWABLE ORAL DAILY PRN
Status: DISCONTINUED | OUTPATIENT
Start: 2024-10-04 | End: 2024-10-06 | Stop reason: HOSPADM

## 2024-10-04 RX ORDER — BENZOCAINE/MENTHOL 6 MG-10 MG
1 LOZENGE MUCOUS MEMBRANE DAILY PRN
Status: CANCELLED | OUTPATIENT
Start: 2024-10-04

## 2024-10-04 RX ORDER — CEFOXITIN SODIUM 2 G/50ML
2000 INJECTION, SOLUTION INTRAVENOUS ONCE
Status: COMPLETED | OUTPATIENT
Start: 2024-10-04 | End: 2024-10-04

## 2024-10-04 RX ADMIN — DOCUSATE SODIUM 100 MG: 100 CAPSULE, LIQUID FILLED ORAL at 09:23

## 2024-10-04 RX ADMIN — ACETAMINOPHEN 650 MG: 325 TABLET ORAL at 17:37

## 2024-10-04 RX ADMIN — IBUPROFEN 600 MG: 600 TABLET ORAL at 23:57

## 2024-10-04 RX ADMIN — IBUPROFEN 600 MG: 600 TABLET ORAL at 04:59

## 2024-10-04 RX ADMIN — SERTRALINE HYDROCHLORIDE 50 MG: 50 TABLET ORAL at 09:23

## 2024-10-04 RX ADMIN — BENZOCAINE AND LEVOMENTHOL 1 APPLICATION: 200; 5 SPRAY TOPICAL at 04:58

## 2024-10-04 RX ADMIN — DOCUSATE SODIUM 100 MG: 100 CAPSULE, LIQUID FILLED ORAL at 17:37

## 2024-10-04 RX ADMIN — HYDROCORTISONE 1 APPLICATION: 1 CREAM TOPICAL at 04:59

## 2024-10-04 RX ADMIN — CEFOXITIN SODIUM 2000 MG: 2 INJECTION, SOLUTION INTRAVENOUS at 00:44

## 2024-10-04 RX ADMIN — IBUPROFEN 600 MG: 600 TABLET ORAL at 17:37

## 2024-10-04 RX ADMIN — ACETAMINOPHEN 650 MG: 325 TABLET ORAL at 11:30

## 2024-10-04 RX ADMIN — IBUPROFEN 600 MG: 600 TABLET ORAL at 11:30

## 2024-10-04 RX ADMIN — WITCH HAZEL 1 PAD: 500 SOLUTION RECTAL; TOPICAL at 04:59

## 2024-10-04 NOTE — ANESTHESIA PROCEDURE NOTES
CSE Block    Patient location during procedure: OB  Start time: 10/3/2024 10:10 PM  Reason for block: procedure for pain and at surgeon's request  Staffing  Performed by: Eric Shahid DO  Authorized by: Eric Shahid DO    Preanesthetic Checklist  Completed: patient identified, IV checked, site marked, risks and benefits discussed, surgical consent, monitors and equipment checked, pre-op evaluation and timeout performed  CSE  Patient position: sitting  Prep: ChloraPrep  Patient monitoring: continuous pulse ox, frequent blood pressure checks and heart rate  Approach: midline  Spinal Needle  Needle type: pencil-tip   Needle gauge: 24 G  Needle length: 10 cm  Epidural Needle  Injection technique: RAFAEL saline  Needle type: Tuohy   Needle gauge: 17 G  Needle length: 9 cm  Needle insertion depth: 4.5 cm  Location: lumbar (1-5)  Catheter  Catheter type: side hole  Catheter size: 19 G  Catheter at skin depth: 10 cm  Catheter securement method: clear occlusive dressing  Test dose: negative  Assessment  Sensory level: T4  Injection Assessment:  negative aspiration for heme, no paresthesia on injection, positive aspiration for clear CSF and no pain on injection.  Additional Notes  Spinal and epidural placed separately to allow for spinal to provide for analgesia for epidural placement, allowing easier epidural placement

## 2024-10-04 NOTE — OB LABOR/OXYTOCIN SAFETY PROGRESS
Oxytocin Safety Progress Check Note - Krysta Santos 24 y.o. female MRN: [id number]    Unit/Bed#: -01 Encounter: [id number]       Contraction Frequency (minutes): 3-4 (not pickingh up well)  Contraction Intensity: Moderate     Cervical Dilation: 10  Dilation Complete Date: 10/03/24  Dilation Complete Time: 1130  Cervical Effacement: 100  Fetal Station: 2  Baseline Rate (FHR): 125 bpm  Fetal Heart Rate (FHT): 125 BPM  FHR Category: 1               Vital Signs:   Vitals:    10/03/24 2300   Resp: 18   Temp: 98.1 °F (36.7 °C)       Notes/comments:   -will initiate pushing   -anticipate vaginal delivery     Tram Montilla DO 10/3/2024 11:37 PM

## 2024-10-04 NOTE — ASSESSMENT & PLAN NOTE
Hemoglobin   Date Value Ref Range Status   09/23/2024 10.9 (L) 11.5 - 15.4 g/dL Final   07/23/2024 10.7 (L) 11.5 - 15.4 g/dL Final   04/15/2024 11.2 (L) 11.5 - 15.4 g/dL Final

## 2024-10-04 NOTE — CASE MANAGEMENT
Case Management Progress Note    Patient name Krysta Santos  Location /-01 MRN [id number]  : 2000 Date 10/4/2024       LOS (days): 1  Geometric Mean LOS (GMLOS) (days):   Days to GMLOS:        OBJECTIVE:        Current admission status: Inpatient  Preferred Pharmacy:   [id number] - [place], [place] - [address]  [address]  [facility] [address]  Phone: [phone] Fax: [phone]    [id number] - [place], [place] - [address] [address]  Phone: [phone] Fax: [phone]    Primary Care Provider: No primary care provider on file.    Primary Insurance: [employer] [facility]  Secondary Insurance:     PROGRESS NOTE:      CM met with MOB to introduce CM services, complete assessment, and provide CM contact info.    MOB had  present and verbalized agreement with personal interview with them present.    MOB reported the following:    Assessment:  Consult reason: Breast Pump/DME  Gestational Age at Birth: 38 Weeks + 3 Days  MOB Name (& age if teen):   Krysta Santos  FOB Name (& age if teen MOB):  Obi Santos    Other Legal Guardian(s) for Baby:   n/a   Other Children:   First Baby  Housing Plan/Lives with: MOB FOB living with in laws     Insurance Coverage/Plan for Baby: MOB verbalizes that they will contact their insurance to add baby ASAP.   Support System: Family and Spouse/Significant Other  Care Items: Car Seat, Crib/Bassinet (Safe Sleep Space), Diapers/Wipes, and Clothing  Method of Feeding: Breast Feeding  Breast Pump: CM ordering/ordered breast pump  Government Assistance Programs: None   Arrangements: MOB  Current Employment/Schooling: MOB unemployed and MOB staying home with baby  Mental Health History and/or Treatment:   MOB history of depression on medications  Substance Use History and/or Treatment: None reported     Urine Drug Screen Results: Not Applicable  Children &  Youth History: None  Current Legal Issues: N/A  Domestic/Intimate Partner Violence History: Denies.  NICU Resources: N/A    Discharge Plan:  Pediatrician:   KASEY Leonard  Prenatal/ Care:  TBD   Follow-Up Appointments Needed/Scheduled: TBD  Medications/DME/Other Referrals: TBD  Transportation Plan: MOB has a vehicle and FOB has a vehicle    Follow-Up Needed from Care Management:       Patient's dad  unexpectedly 10/2. Pt looking for list for outpatient therapist, CM provided  [facility] and outpatient resources.     CM received consult for MOB requesting Spectra S2 for home use. Order placed to [facility] via [place]. Pump delivered to bedside by CM.

## 2024-10-04 NOTE — L&D DELIVERY NOTE
"Vaginal Delivery Summary - OB/GYN   Krysta Santos 24 y.o. female MRN: [id number]  Unit/Bed#:  201-01 Encounter: [id number]    Pre-delivery Diagnosis:   Pregnancy at 38.3wks   Labor  Rubella nonimmune   GDMA 1   Rh neg     Post-delivery Diagnosis: same, delivered    Procedure: Spontaneous Vaginal Delivery     OBGYN Practice: [facility]    Attending Physician: Dr. Montilla  Resident Physician: Dr. Ken  Other Assistant: Dr. Gr    Anesthesia: Epidural,     QBL:      pending    Complications: none apparent    Specimens:   1. Arterial and venous cord gases  2. Cord blood  3. Segment of umbilical cord  4. Placenta to storage     Findings:  1. Viable male  on 10/4/2024 12:18 AM via Vaginal, Spontaneous . with APGAR scores of 8  and 9  at 1 and 5 minutes, respectively. Weight pending at time of dictation for skin to skin bonding.  2. Spontaneous delivery of intact placenta   3. 3 A degree laceration repaired with 2-0 Vicryl Rapide      Gases:  Umbilical Artery  No results for input(s): \"PHCART\", \"BECART\" in the last 72 hours.    Umbilical Vein  Recent Labs     10/04/24  0022   PHCVEN 7.304   BECVEN -7.6*           Brief history and labor course:  Krysta Santos is a 24 y.o. at 38w3d . She presented to labor and delivery for regular painful contractions and leakage of fluid. . Her pregnancy was complicated by GDMA 1. On exam in triage she was noted to be 8/100/0. She was admitted for labor.    Description of delivery:    After pushing, Krysta delivered a viable male , wt pending as mother is doing skin to skin bonding. The fetal vertex delivered OA position spontaneously. There was nuchal cord x3 around neck. The anterior shoulder delivered atraumatically with maternal expulsive forces and the assistance of gentle downward traction. The posterior shoulder delivered with maternal expulsive forces and the assistance of gentle upward traction. The remainder of the fetus delivered " Prior Authorization Approval    Authorization Effective Date: 6/24/2022  Authorization Expiration Date: 6/24/2023  Medication: Ondasetron  Approved Dose/Quantity: 90 for 30 days  Reference #:   [id number]  Insurance Company:    Expected CoPay:       CoPay Card Available:      Foundation Assistance Needed:    Which Pharmacy is filling the prescription (Not needed for infusion/clinic administered):    Pharmacy Notified:    Patient Notified:       spontaneously.      Upon delivery, the infant was placed on Krysta's abdomen and the cord was doubly clamped and cut. Delayed cord clamping was achieved. The infant was noted to cry spontaneously and was moving all extremities appropriately. There was no evidence for injury. Awaiting nurse resuscitators evaluated the . Arterial and venous cord blood gases and cord blood was collected for analysis. These were promptly sent to the lab. In the immediate post-partum, active management of the 3rd stage of labor was performed with massage, the administration of 30 units of IV pitocin, and gentle traction on the umbilical cord. The placenta delivered spontaneously and was noted to have a centrally inserted 3 vessel cord.  The placenta was sent to storage.     Laceration Repair  The vagina, cervix, perineum, and rectum were inspected and there was noted to be 3A laceration. Cefoxitin was ordered and given to patient during repair.     The patient was comfortable with epidural for analgesia. The external anal sphincter was frayed at the anterior portion. This was reinforced with two interrupted sutures using 2-0 vicryl. The vaginal mucosa and submucosa were then re approximated using 2-0 vicryl rapide to the point of the hymenal ring. Interrupted 2-0 vicryl was used to re approximate the muscle and fascia. The superficial perineal fascia was then re approximated using 2-0 vicryl in a running non locked stitch downward followed by a running subcuticular stitch upward to the level of the introitus.    At the conclusion of the procedure, all needle, sponge, and instrument counts were noted to be correct. Dr. Montilla was present and participated in all key portions of the case.    Disposition:  The patient and the  both tolerated the procedure well and are recovering in labor and delivery room       Tram Montilla DO  10/4/2024  1:20 AM

## 2024-10-04 NOTE — LACTATION NOTE
This note was copied from a baby's chart.  CONSULT - LACTATION  Baby Boy (Mckenna Santos 0 days male MRN: [id number]    [facility] NURSERY Room / Bed: (N)/(N) Encounter: [id number]    Maternal Information     MOTHER:  Krysta Santos  Maternal Age: 24 y.o.  OB History: # 1 - Date: 10/04/24, Sex: Male, Weight: 3010 g (6 lb 10.2 oz), GA: 38w3d, Type: Vaginal, Spontaneous, Apgar1: 8, Apgar5: 9, Living: Living, Birth Comments: None   Previouse breast reduction surgery? No    Lactation history:   Has patient previously breast fed: No (did attend a prenatal breastfeeding class)   How long had patient previously breast fed:     Previous breast feeding complications:       Past Surgical History:   Procedure Laterality Date    LYMPHANGIOMA EXCISION         Birth information:  YOB: 2024   Time of birth: 12:18 AM   Sex: male   Delivery type: Vaginal, Spontaneous   Birth Weight: 3010 g (6 lb 10.2 oz)   Percent of Weight Change: 0%     Gestational Age: 38w3d   [unfilled]    Assessment     Breast and nipple assessment: cracked nipples and nipples retract through skin at times, while the body of the nipple protrudes.     Assessment: sleepy    Feeding assessment: no latch     10/04/24 1540   Lactation Consultation   Reason for Consult 20;20 m;20 min   Maternal Information   Has mother  before? No  (did attend a prenatal breastfeeding class)   LATCH Documentation   Latch 1   Audible Swallowing 0   Type of Nipple 2   Comfort (Breast/Nipple) 0   Hold (Positioning) 0   LATCH Score 3   Having latch problems? No  (baby sleepy, recently fed)   Position(s) Used Cross Cradle;Laid Back;Football   Breasts/Nipples   Left Breast Soft   Right Breast Soft   Left Nipple Everted;Other (Comment);Sore;Cracked  (body of nipple everted, at times, recessed behind skin, but still with silouette of eversion.)   Right Nipple Everted;Other (Comment);Sore;Cracked  (body of  nipple everted, at times, recessed behind skin, but still with silouette of eversion.)   Intervention Hand expression;Lanolin   Breastfeeding Progress Not yet established   Other OB Lactation Tools   Feeding Devices Feeding Cup   Breast Pump   Pump 3  (Has Spectra S2)   Patient Follow-Up   Lactation Consult Status 2   Follow-Up Type Inpatient;Call as needed   Other OB Lactation Documentation    Additional Problem Noted Reviewed concepts of position and latch with familyFamily needed much support with increasing confidence of handling baby. Encouraged stablNipples tender and cracked. Krysta using lanolin cream for pain.  (Family had prenatal breastfeeding class. RSB and D/C booklets at bedside. Reviewed alcohol and breast care as parents asked questions.)       Feeding recommendations:  breast feed on demand    Information on hand expression given. Discussed benefits of knowing how to manually express breast including stimulating milk supply, softening nipple for latch and evacuating breast in the event of engorgement.    Reviewed how to bring baby to the breast so that his lower lip and chin touch the breast with his nose just above the nipple to encourage a wider, more asymmetric latch.    Met with [employer]ad. Provided  with Ready, Set, Baby booklet which contained information on:  Hand expression with access to QR codes to review hand expression.  Positioning and latch reviewed as well as showing images of other feeding positions.  Discussed the properties of a good latch in any position.   Feeding on cue and what that means for recognizing infant's hunger, s/s that baby is getting enough milk and some s/s that breastfeeding dyad may need further help  Skin to Skin contact and benefits to mom and baby  Avoidance of pacifiers for the first month discussed.   Gave information on common concerns, what to expect the first few weeks after delivery, preparing for other caregivers, and how partners can help. Resources for  support also provided.    Also reviewed discharge breastfeeding booklet including the feeding log. Emphasized 8 or more (12) feedings in a 24 hour period, what to expect for the number of diapers per day of life and the progression of properties of the  stooling pattern.    List of reasons to call a lactation consultant.  Feeding logs  Feeding cues  Hand expression  Baby's Second day (cluster feeding)  Breastfeeding and Your Lifestyle (Medications, Alcohol, Caffeine, Smoking, Street Drugs, Methadone)  First Two Weeks Survival Guide for Breastfeeding  Breast Changes  Physical Therapy  Storage and Handling of Breast milk  How to Keep Your Breast Pump Kit Clean  The Employed Breastfeeding Mother  Mixed feeding  Bottle feeding like breastfeeding (paced bottle feeding)  astfeeding and your lifestyle, storage and preparation of breast milk, how to keep you breast pump clean, the employed breastfeeding mother and paced bottle feeding handouts.     Booklet included Breastfeeding Resources for after discharge including access to the number for the [facility].    Encouraged parents to call for assistance, questions, and concerns about breastfeeding.  Sarah Daley RN 10/4/2024 4:47 PM

## 2024-10-04 NOTE — QUICK NOTE
"Called to the bedside as patient felt \"not well\". On initial evaluation, she was noted to have a BP of 100s/70s. She states that her blood pressures are usually lower than this. She denies CP and SOB. On exam, she is noted to have vaginal swelling but minimal lochia. On bimanual exam, she has minimal clot in the LENIN. On fundal exam she is noted to be firm, but with urinary overflow. Recommend straight catheterization and continued monitoring.     Shelley Casanova MD  OBGYN PGY-4  10/04/24 3:32 AM  "

## 2024-10-04 NOTE — DISCHARGE SUMMARY
Obstetrics Discharge Summary  Krysta Santos 24 y.o. female MRN: [id number]  Unit/Bed#: -01 Encounter: [id number]    Admission Date: 10/3/2024   Discharge Attending: Clara Spence MD  Discharge Diagnosis:   No new Assessment & Plan notes have been filed under this hospital service since the last note was generated.  Service: OB/GYN       Delivery Method: Vaginal, Spontaneous   Delivery Date and Time: 10/4/2024 12:18 AM  Delivery Attending: Tram Montilla DO    Anesthesia: epidural    Hospital course:   Krysta Santos is a G1-P1    On 10/4/24 at 0018 she delivered a viable male  38w3d via spontaneous vaginal delivery. She sustained 3rd degree laceration during delivery  which was adequately repaired. 's birth weight was 6lbs 10oz; Apgars were 8 (1 min) and  9 (5 min).    Her post-delivery course was complicated. Her postpartum pain was well controlled with oral analgesics. Mom's blood type is A negative, while baby's is A positive.  RhoGAM was given      On day of discharge, she was ambulating and able to reasonably perform all ADLs. She was voiding and had appropriate bowel function. Pain was well controlled. She was discharged home on postpartum day #2 without complications. Patient was instructed to follow up with her OBGYN as an outpatient and was given appropriate warnings to call provider if she develops signs of infection or uncontrolled pain.    Complications: none apparent    Condition at discharge: good     Provisions for Follow-Up Care:  Please see after visit summary for information related to follow-up care and any pertinent home health orders.    No follow-ups on file.    Disposition: Home    Discharge Medications:   Please see AVS for a complete list of discharge medications.    Discharge instructions :   Please see AVS for complete discharge instructions.      Jael Willingham MD  PGY-2 OBGYN

## 2024-10-04 NOTE — PLAN OF CARE

## 2024-10-04 NOTE — TELEPHONE ENCOUNTER
Reached out to on call provider, Dr. Tram Montilla, to let her know about patient's contractions and severity.  While on the call with patient, patient had 3 contraction in the 10 minute call. Dr. Montilla agreed with my disposition and patient is going to [facility].  L&D charge nurse Lina Snyder made aware of patient incoming.    Care advice and recommendations given.   Patient verbalized understanding.    Please follow up with patient.

## 2024-10-04 NOTE — H&P
H & P- Obstetrics   Krysta Santos 24 y.o. female MRN: [id number]  Unit/Bed#: -01 Encounter: [id number]    ObGyn Provider:  [facility] ([facility])    Assessment: 24 y.o.  at 38w2d admitted for spontaneous rupture of membranes.  SVE: 8/100/0  FHT Category 1  Clinical EFW:  EFW @30%tile @37w ; Cephalic confirmed by BSUS  GBS: positive      Plan:   Anemia during pregnancy in third trimester  Assessment & Plan  Hemoglobin   Date Value Ref Range Status   2024 10.9 (L) 11.5 - 15.4 g/dL Final   2024 10.7 (L) 11.5 - 15.4 g/dL Final   04/15/2024 11.2 (L) 11.5 - 15.4 g/dL Final        Rh negative state in antepartum period  Assessment & Plan  Rhogam pp    Group B streptococcal bacteriuria  Assessment & Plan  Tx w/PCN    * Amniotic fluid leaking  Assessment & Plan  Admit to OBGYN   Clear liquid diet   F/u T&S, CBC, RPR   IVF LR 125cc/hr   Continuous fetal monitoring and tocometry   Analgesia at maternal request   Vertex by TAUS            Discussed case and plan w/ Tram Montilla DO      Chief Complaint: contractions and leaking fluid    HPI: Krysta Santos is a 24 y.o.  with an MEREDITH of 10/15/2024, by Last Menstrual Period at 38w2d who is being admitted for labor  and spontaneous rupture of membranes. She complains of uterine contractions, occurring every 2-3 minutes, has large amounts of fluid leaking, and reports no VB. She states she has felt good FM.    Baby complications/comments: none    Review of Systems   Respiratory:  Negative for chest tightness and shortness of breath.    Cardiovascular:  Negative for chest pain.       OB Hx:  OB History    Para Term  AB Living   1             SAB IAB Ectopic Multiple Live Births                  # Outcome Date GA Lbr Mc/2nd Weight Sex Type Anes PTL Lv   1 Current                Past Medical Hx:  Past Medical History:   Diagnosis Date    Anxiety     specifically about nausea in pregnancy and labor    Depression      discontinued medications in pregnancy    Family history of lupus anticoagulant disorder     father of patient    Family history of MTHFR deficiency     Father of patient-    Varicella     had disease       Past Surgical hx:  Past Surgical History:   Procedure Laterality Date    LYMPHANGIOMA EXCISION           Social History     Tobacco Use   Smoking Status Never   Smokeless Tobacco Never   Tobacco Comments    None         No Known Allergies      Medications Prior to Admission:     Blood Glucose Monitoring Suppl (Contour Next Gen Monitor) w/Device KIT    Contour Next Test test strip    ferrous sulfate 324 (65 Fe) mg    Microlet Lancets MISC    ondansetron (ZOFRAN-ODT) 4 mg disintegrating tablet    pantoprazole (PROTONIX) 40 mg tablet    Zbomdm-SqCrs-XcQeh-FA-CA-Omega (Complete Elisabeth DHA) 29-1-200 & 200 MG MISC    Prenatal MV-Min-Fe Fum-FA-DHA (Prenatal+DHA) 28-0.975 & 200 MG MISC    sertraline (Zoloft) 50 mg tablet    Objective:  Temp:  [98.6 °F (37 °C)] 98.6 °F (37 °C)  HR:  [68-90] 68  BP: (121-127)/(78-79) 127/78  Resp:  [16] 16  There is no height or weight on file to calculate BMI.     Physical Exam:  Physical Exam  Constitutional:       General: She is in acute distress.   Genitourinary:      Vulva and rectum normal.   HENT:      Nose: Nose normal.   Cardiovascular:      Rate and Rhythm: Normal rate.      Pulses: Normal pulses.   Musculoskeletal:         General: Normal range of motion.      Cervical back: Normal range of motion.   Skin:     General: Skin is warm.   Vitals reviewed. Exam conducted with a chaperone present.          Fetal monitoring:  Fetal heart rate:    [place]:      Lab Results   Component Value Date    WBC 6.62 2024    HGB 10.9 (L) 2024    HCT 34.1 (L) 2024     2024     Lab Results   Component Value Date    K 4.0 2024     2024    CO2 25 2024    BUN 7 2024    CREATININE 0.59 2024    AST 20 2024    ALT 22 2024      Prenatal Labs: Reviewed      Blood type: A negative  Antibody: negative  GBS: positive  HIV: Non-Reactive  Rubella: Non Immune  Syphilis IgM/IgG: Non-Reactive  HBsAg: Non-Reactive  HCAb: Non-Reactive  Chlamydia: negative  Gonorrhea: negative  Diabetes 1 hour screen: abnormal  Diabetes 3 hour screen: abnormal  Platelets: 154  Hgb: 10.9    >2 Midnights  INPATIENT     Signature/Title:  Jessi Ken MD  Date: 10/3/2024  Time: 9:30 PM

## 2024-10-04 NOTE — ASSESSMENT & PLAN NOTE
Admit to OBGYN   Clear liquid diet   F/u T&S, CBC, RPR   IVF LR 125cc/hr   Continuous fetal monitoring and tocometry   Analgesia at maternal request   Vertex by TAUS

## 2024-10-04 NOTE — ANESTHESIA POSTPROCEDURE EVALUATION
Post-Op Assessment Note    CV Status:  Stable    Pain management: adequate      Post-op block assessment: no complications and catheter intact   Mental Status:  Alert and awake   Hydration Status:  Euvolemic   PONV Controlled:  Controlled   Airway Patency:  Patent     Post Op Vitals Reviewed: Yes                  BP      Temp      Pulse     Resp      SpO2

## 2024-10-04 NOTE — ANESTHESIA PREPROCEDURE EVALUATION
Procedure:  LABOR ANALGESIA    Relevant Problems   No relevant active problems        Physical Exam    Airway    Mallampati score: III  TM Distance: >3 FB  Neck ROM: full     Dental   No notable dental hx     Cardiovascular      Pulmonary      Other Findings  post-pubertal.      Anesthesia Plan  ASA Score- 2     Anesthesia Type- epidural with ASA Monitors.         Additional Monitors:     Airway Plan:            Plan Factors-Exercise tolerance (METS): >4 METS.    Chart reviewed.   Existing labs reviewed. Patient summary reviewed.    Patient is not a current smoker.              Induction-     Postoperative Plan-     Perioperative Resuscitation Plan - Level 1 - Full Code.       Informed Consent- Anesthetic plan and risks discussed with patient.  I personally reviewed this patient with the CRNA. Discussed and agreed on the Anesthesia Plan with the CRNA..

## 2024-10-04 NOTE — ASSESSMENT & PLAN NOTE
Recovering well   Routine postpartum care, encourage ambulation, encourage familial bonding  Lactation support for breast/bottle feeding as needed  Pre-delivery Hgb 12  FEN: Tolerating regular diet  Pain: Motrin/Tylenol around the clock, oxycodone if needed  Appropriate bowel and bladder function   DVT Ppx: Ambulation, SCDs

## 2024-10-04 NOTE — PROGRESS NOTES
"A+P: 24 y.o.  at 38w3d who is PPD#0 s/p uncomplicated .     Progressing appropriately postpartum, attempt to void now. Reviewed medicaiton for hemorrhoids    EDPS 19. Her father  unexpectedly two days ago. On zoloft 50mg, has not taken in 2 days and this was ordered. Recently started on this and just increased dose two days before. Recommend continuing this dose for now. Offered psychiatry to see her which she does not fel like she needs. She is okay with Case management consult. Had a therapist in [place] and not in the [place]. Resources given for her to find a therapist also.    S: has not voided since straight cath at delivery. Plans to try to void now. Reports rectal pressure/soreness at times due to hemorrhoids. BF. Feels like she is dealing well but very upset.    O: /61 (BP Location: Right arm)   Pulse 78   Temp 98.6 °F (37 °C) (Oral)   Resp 18   Ht 5' 2\" (1.575 m)   Wt 56.2 kg (124 lb)   LMP 2024 (Exact Date)   SpO2 98%   Breastfeeding Yes   BMI 22.68 kg/m²     Gen: alert    "

## 2024-10-05 LAB — HOLD SPECIMEN: NORMAL

## 2024-10-05 PROCEDURE — 99024 POSTOP FOLLOW-UP VISIT: CPT | Performed by: STUDENT IN AN ORGANIZED HEALTH CARE EDUCATION/TRAINING PROGRAM

## 2024-10-05 RX ADMIN — IBUPROFEN 600 MG: 600 TABLET ORAL at 15:45

## 2024-10-05 RX ADMIN — IBUPROFEN 600 MG: 600 TABLET ORAL at 06:40

## 2024-10-05 RX ADMIN — WITCH HAZEL 1 PAD: 500 SOLUTION RECTAL; TOPICAL at 10:25

## 2024-10-05 RX ADMIN — DOCUSATE SODIUM 100 MG: 100 CAPSULE, LIQUID FILLED ORAL at 15:45

## 2024-10-05 RX ADMIN — SERTRALINE HYDROCHLORIDE 50 MG: 50 TABLET ORAL at 07:57

## 2024-10-05 RX ADMIN — HUMAN RHO(D) IMMUNE GLOBULIN 300 MCG: 300 INJECTION, SOLUTION INTRAMUSCULAR at 16:30

## 2024-10-05 RX ADMIN — DOCUSATE SODIUM 100 MG: 100 CAPSULE, LIQUID FILLED ORAL at 07:58

## 2024-10-05 RX ADMIN — IBUPROFEN 600 MG: 600 TABLET ORAL at 21:55

## 2024-10-05 NOTE — ASSESSMENT & PLAN NOTE
Lab Results   Component Value Date    HGBA1C 4.7 06/14/2024       Recent Labs     10/03/24  2243 10/03/24  2355 10/04/24  0331   POCGLU 156* 145* 117       Blood Sugar Average: Last 72 hrs:  (P) [id number]    F/u outpatient for postpartum 2hr glucola

## 2024-10-05 NOTE — ASSESSMENT & PLAN NOTE
Elevated EPDS (19)   Father passed away unexpectedly a few days ago   On Zoloft   No SI/HI  S/p CM consult, looking into talk therapy options

## 2024-10-05 NOTE — PROGRESS NOTES
Obstetrics Progress Note  Krysta Santos 24 y.o. female MRN: [id number]  Unit/Bed#:  313-01 Encounter: [id number]    Assessment/Plan:     Postpartum Day #1 s/p  delivery complicated by 3a laceration, currently stable. Baby in room. By issue:    Postpartum depression  Assessment & Plan  Elevated EPDS (19)   Father passed away unexpectedly a few days ago   On Zoloft   No SI/HI  S/p CM consult, looking into talk therapy options    Perineal laceration during delivery  Assessment & Plan  S/p 1g cefoxitin    Anemia during pregnancy in third trimester  Assessment & Plan  Hemoglobin   Date Value Ref Range Status   2024 10.9 (L) 11.5 - 15.4 g/dL Final   2024 10.7 (L) 11.5 - 15.4 g/dL Final   04/15/2024 11.2 (L) 11.5 - 15.4 g/dL Final        Diet controlled gestational diabetes mellitus (GDM) in third trimester  Assessment & Plan  Lab Results   Component Value Date    HGBA1C 4.7 2024       Recent Labs     10/03/24  2243 10/03/24  2355 10/04/24  0331   POCGLU 156* 145* 117       Blood Sugar Average: Last 72 hrs:  (P) [id number]    F/u outpatient for postpartum 2hr glucola      Rh negative state in antepartum period  Assessment & Plan  Rhogam pp    Rubella non-immune status, antepartum  Assessment & Plan  Offer MMR    *  (spontaneous vaginal delivery)  Assessment & Plan  Recovering well   Routine postpartum care, encourage ambulation, encourage familial bonding  Lactation support for breast/bottle feeding as needed  Pre-delivery Hgb 12  FEN: Tolerating regular diet  Pain: Motrin/Tylenol around the clock, oxycodone if needed  Appropriate bowel and bladder function   DVT Ppx: Ambulation, SCDs        Subjective/Objective     Subjective:   No acute events overnight. Pain: controlled. Tolerating PO: yes. Voiding: yes. Flatus: passing. Ambulating: yes. Chest pain: no. Shortness of breath: no. Leg pain: no. Lochia: within normal limits. Baby in room, feeding via breast. She would like to stay  until PPD2.    Objective:   Vitals:   Temp:  [97.8 °F (36.6 °C)-98.6 °F (37 °C)] 98.2 °F (36.8 °C)  HR:  [78-89] 78  BP: ()/(55-77) 99/67  Resp:  [14-20] 16  SpO2:  [97 %-98 %] 98 %  O2 Device: None (Room air)       Intake/Output Summary (Last 24 hours) at 10/5/2024 0444  Last data filed at 10/4/2024 1500  Gross per 24 hour   Intake --   Output 1450 ml   Net -1450 ml       Lab Results   Component Value Date    WBC 13.17 (H) 10/03/2024    HGB 12.1 10/03/2024    HCT 37.0 10/03/2024    MCV 91 10/03/2024     10/03/2024       Physical Exam:   General: alert and oriented x3, in no apparent distress  Cardiovascular: regular rate and rhythm  Pulmonary: normal effort, CTAB  Abdomen: Soft, non-tender, non-distended, no rebound or guarding. Uterine fundus firm and non-tender, -1 cm below the umbilicus  Extremities: Non tender with no edema    Jael Willingham MD  OBGYN PGY2  10/5/2024, 4:44 AM

## 2024-10-05 NOTE — PLAN OF CARE

## 2024-10-06 ENCOUNTER — NURSE TRIAGE (OUTPATIENT)
Dept: OTHER | Facility: OTHER | Age: 24
End: 2024-10-06

## 2024-10-06 VITALS
DIASTOLIC BLOOD PRESSURE: 54 MMHG | WEIGHT: 124 LBS | HEART RATE: 80 BPM | SYSTOLIC BLOOD PRESSURE: 110 MMHG | RESPIRATION RATE: 18 BRPM | BODY MASS INDEX: 22.82 KG/M2 | HEIGHT: 62 IN | TEMPERATURE: 98.9 F | OXYGEN SATURATION: 97 %

## 2024-10-06 PROCEDURE — NC001 PR NO CHARGE: Performed by: OBSTETRICS & GYNECOLOGY

## 2024-10-06 PROCEDURE — 99024 POSTOP FOLLOW-UP VISIT: CPT | Performed by: OBSTETRICS & GYNECOLOGY

## 2024-10-06 RX ORDER — DOCUSATE SODIUM 100 MG/1
100 CAPSULE, LIQUID FILLED ORAL 2 TIMES DAILY
Start: 2024-10-06

## 2024-10-06 RX ORDER — BENZOCAINE/MENTHOL 6 MG-10 MG
1 LOZENGE MUCOUS MEMBRANE DAILY PRN
Start: 2024-10-06

## 2024-10-06 RX ORDER — IBUPROFEN 600 MG/1
600 TABLET, FILM COATED ORAL EVERY 6 HOURS
Start: 2024-10-06

## 2024-10-06 RX ORDER — ACETAMINOPHEN 325 MG/1
650 TABLET ORAL EVERY 4 HOURS PRN
Start: 2024-10-06

## 2024-10-06 RX ADMIN — SERTRALINE HYDROCHLORIDE 50 MG: 50 TABLET ORAL at 09:24

## 2024-10-06 RX ADMIN — IBUPROFEN 600 MG: 600 TABLET ORAL at 09:24

## 2024-10-06 RX ADMIN — DOCUSATE SODIUM 100 MG: 100 CAPSULE, LIQUID FILLED ORAL at 09:24

## 2024-10-06 RX ADMIN — IBUPROFEN 600 MG: 600 TABLET ORAL at 04:04

## 2024-10-06 NOTE — LACTATION NOTE
This note was copied from a baby's chart.  Discharge Lactation    Met with Krysta who is breastfeeding her baby boy and both are anticipating discharge home today. Krysta states many latches are painful and her nipples are sore. She states baby prefers the left breast and does not always latch on the right. She has been hand expressing and cup feeding.     Upon exam, Krysta's nipples have cracking and scabbing on both nipple faces. Krysta reports concerns that her nipple type (everted with a mildly dimpled center) would affect baby's latch. Reassured that all nipple types can breastfeed. Assessed baby's mouth while he was sleeping, his tongue appears to have full range of motion, but peristalsis and cupping could not be assessed.    Krysta requested a latch assessment and to practice positioning. Reviewed supportive positioning. Krysta is able to independently position baby. Baby rooted and Krysta latched baby, but his lips appeared puckered and Krysta reported pain during the entirety of suckling beyond 1 minute. Discussed initial latch on pain and shallow latch pain. Discussed how to break the suction of a latch. Baby was too sleepy to latch again. Encouraged guiding baby onto the breast deeply and swiftly. Baby's cheeks should be touching the breast. Discussed holding the breast compression for baby the entire feed until he has the strength to sustain a deep latch in the next few weeks.  Reviewed positioning in football hold and cross cradle.    Discussed limiting and delaying pacifier use. Discussed using cylindrical pacifiers. Discussed appropriate times to pump. Discussed cup, spoon, and syringe feeding if baby does not latch. Discussed breast care and comfort. Discussed hand expression and reverse pressure softening. Discussed wet wound healing with nipple balm/lanolin/olive oil and telfa/wax paper/parchment paper. Discussed appropriate Silverette usage of 20 minutes at a time a 4-6 times a day for up  to 2 days. Discussed nipple healing should occur within a week as long as latches are deep.    Encouraged utilizing the [facility] for virtual support groups, in person groups, and scheduled lactation consultations. Discussed calling for any questions or concerns. Parents have Ready Set Baby and Discharge Lactation Booklets for home review as needed.

## 2024-10-06 NOTE — TELEPHONE ENCOUNTER
"Pt w episode of chest pressure and SOB while going for a short walk. Chest pressure resolved but still mildly SOB after resting for about 5 minutes. Advised ED per protocol to r/o PE but patient hesitant. Communicated with on call who advised patient can rest, try some slow deep breathing, and if it does not resolved should go to [place] [facility] for evaluation. Relayed to patient who verbalized understanding.     Reason for Disposition   Pregnant or postpartum (from 0 to 6 weeks after delivery)    Answer Assessment - Initial Assessment Questions  1. RESPIRATORY STATUS: \"Describe your breathing?\" (e.g., wheezing, shortness of breath, unable to speak, severe coughing)       Mild shortness of breath with chest pressure while walking      5 minutes ago      Chest pressure has stopped but still feels a little SOB    2. ONSET: \"When did this breathing problem begin?\"       5 minutes ago on a short walk    3. PATTERN \"Does the difficult breathing come and go, or has it been constant since it started?\"       Just happened one  4. SEVERITY: \"How bad is your breathing?\" (e.g., mild, moderate, severe)       mild    5. RECURRENT SYMPTOM: \"Have you had difficulty breathing before?\" If Yes, ask: \"When was the last time?\" and \"What happened that time?\"       Last time felt like this was when she had covid 2 months ago    6. CARDIAC HISTORY: \"Do you have any history of heart disease?\" (e.g., heart attack, angina, bypass surgery, angioplasty)       no    7. LUNG HISTORY: \"Do you have any history of lung disease?\"  (e.g., pulmonary embolus, asthma, emphysema)      no    8. CAUSE: \"What do you think is causing the breathing problem?\"      Not sure short walk    9. OTHER SYMPTOMS: \"Do you have any other symptoms?\" (e.g., chest pain, cough, dizziness, fever, runny nose)      Had some chest pressure      Mild cough starting the day after delivery    10. O2 SATURATION MONITOR:  \"Do you use an oxygen saturation monitor (pulse oximeter) at " "home?\" If Yes, ask: \"What is your reading (oxygen level) today?\" \"What is your usual oxygen saturation reading?\" (e.g., 95%)        No    11. PREGNANCY: \"Is there any chance you are pregnant?\" \"When was your last menstrual period?\"        2 days post partum        128/79 which is high for her    Protocols used: Breathing Difficulty-Adult-AH    "

## 2024-10-06 NOTE — PLAN OF CARE

## 2024-10-06 NOTE — TELEPHONE ENCOUNTER
"Regarding: postpartum/SOB/pressure on chest  ----- Message from Alisia CARCAMO sent at 10/6/2024  5:20 PM EDT -----  \"I had a baby 2 days ago and I took a walk and had shortness of breath and pressure on chest. I wanted to know if that was normal\"    " 04-Jul-2018

## 2024-10-06 NOTE — PLAN OF CARE

## 2024-10-06 NOTE — PROGRESS NOTES
Obstetrics Progress Note  Krysta Santos 24 y.o. female MRN: [id number]  Unit/Bed#: -01 Encounter: [id number]    Assessment/Plan:    Postpartum Day #2 s/p  complicated by 3a laceration, currently stable. Baby in room. By issue:    Postpartum depression  Assessment & Plan  Elevated EPDS (19)   Father passed away unexpectedly a few days ago   On Zoloft   No SI/HI  S/p CM consult, looking into talk therapy options    Perineal laceration during delivery  Assessment & Plan  S/p 1g cefoxitin    Anemia during pregnancy in third trimester  Assessment & Plan  Hemoglobin   Date Value Ref Range Status   2024 10.9 (L) 11.5 - 15.4 g/dL Final   2024 10.7 (L) 11.5 - 15.4 g/dL Final   04/15/2024 11.2 (L) 11.5 - 15.4 g/dL Final        Diet controlled gestational diabetes mellitus (GDM) in third trimester  Assessment & Plan  Lab Results   Component Value Date    HGBA1C 4.7 2024       Recent Labs     10/03/24  2243 10/03/24  2355 10/04/24  0331   POCGLU 156* 145* 117       Blood Sugar Average: Last 72 hrs:  (P) [id number]    F/u outpatient for postpartum 2hr glucola      Rh negative state in antepartum period  Assessment & Plan  Rhogam pp    Rubella non-immune status, antepartum  Assessment & Plan  Offer MMR    *  (spontaneous vaginal delivery)  Assessment & Plan  Recovering well   Routine postpartum care, encourage ambulation, encourage familial bonding  Lactation support for breast/bottle feeding as needed  Pre-delivery Hgb 12  FEN: Tolerating regular diet  Pain: Motrin/Tylenol around the clock, oxycodone if needed  Appropriate bowel and bladder function   DVT Ppx: Ambulation, SCDs        Subjective/Objective     Subjective:   No acute events overnight. Pain: controlled. Tolerating PO: yes. Voiding: yes. Flatus: passing. Ambulating: yes. Chest pain: no. Shortness of breath: no. Leg pain: no. Lochia: within normal limits. Baby in room, feeding via breast.    Objective:   Vitals:   Temp:  [98  °F (36.7 °C)-98.7 °F (37.1 °C)] 98 °F (36.7 °C)  HR:  [74-76] 74  BP: ()/(54-59) 98/54  Resp:  [16-17] 17  SpO2:  [97 %-98 %] 97 %  O2 Device: None (Room air)     No intake or output data in the 24 hours ending 10/06/24 0635    Lab Results   Component Value Date    WBC 13.17 (H) 10/03/2024    HGB 12.1 10/03/2024    HCT 37.0 10/03/2024    MCV 91 10/03/2024     10/03/2024       Physical Exam:   General: alert and oriented x3, in no apparent distress  Cardiovascular: regular rate and rhythm  Pulmonary: normal effort, CTAB  Abdomen: Soft, non-tender, non-distended, no rebound or guarding. Uterine fundus firm and non-tender, below the umbilicus  Extremities: Non tender with no edema    Jael Willingham MD  OBGYN PGY2  10/6/2024, 6:35 AM

## 2024-10-07 LAB
DME PARACHUTE DELIVERY DATE ACTUAL: NORMAL
DME PARACHUTE DELIVERY DATE REQUESTED: NORMAL
DME PARACHUTE ITEM DESCRIPTION: NORMAL
DME PARACHUTE ORDER STATUS: NORMAL
DME PARACHUTE SUPPLIER NAME: NORMAL
DME PARACHUTE SUPPLIER PHONE: NORMAL

## 2024-10-07 NOTE — UTILIZATION REVIEW
"Mother and baby discharged on 10/06/2024     NOTIFICATION OF INPATIENT ADMISSION   MATERNITY/DELIVERY AUTHORIZATION REQUEST   SERVICING FACILITY:   [facility] - L&D, [place], NICU  [address] [place] [address]  Tax ID: [id number]  NPI: [id number]   ATTENDING PROVIDER:  Attending Name and NPI#: Tram Montilla Do [[id number]]  Address: [address] [place] [address]  Phone: [phone]   ADMISSION INFORMATION:  Place of Service: Inpatient [facility]  Place of Service Code: 21  Inpatient Admission Date/Time: 10/3/24  9:20 PM  Discharge Date/Time: 10/6/2024  1:49 PM  Admitting Diagnosis Code/Description:  38 weeks gestation of pregnancy [Z3A.38]  Uterine contractions during pregnancy [O47.9]  Encounter for full-term uncomplicated delivery [O80]     Mother: Krysta Santos 2000 Estimated Date of Delivery: 10/15/24  Delivering clinician: Tram Montilla   OB History          1    Para   1    Term   1            AB        Living   1         SAB        IAB        Ectopic        Multiple   0    Live Births   1                Name & MRN:   Information for the patient's :  Roger Santosony [[id number]]   [place] Delivery Information:  Sex: male  Delivered 10/4/2024 12:18 AM by Vaginal, Spontaneous; Gestational Age: 38w3d    [place] Measurements:  Weight: 6 lb 10.2 oz (3010 g);  Height: 19\"    APGAR 1 minute 5 minutes 10 minutes   Totals: 8 9       UTILIZATION REVIEW CONTACT:  Brooklynn Still Utilization   Network Utilization Review Department  Phone: [phone]  Fax [phone]  Email: [email or web address]  Contact for approvals/pending authorizations, clinical reviews, and discharge.     PHYSICIAN ADVISORY SERVICES:  Medical Necessity Denial & Sued-av-Xtni Review  Phone: [phone]  Fax: [phone]  Email: [email or web address]     DISCHARGE SUPPORT TEAM:  For Patients Discharge " Needs & Updates  Phone: [phone] opt. 2 Fax: [phone]  Email: [email or web address]

## 2024-10-07 NOTE — TELEPHONE ENCOUNTER
Patient given recommendations by on call provider. Advised to go to ED if symptoms worsen to be evaluated. Patient aware.

## 2024-10-08 ENCOUNTER — TELEPHONE (OUTPATIENT)
Dept: OBGYN CLINIC | Facility: CLINIC | Age: 24
End: 2024-10-08

## 2024-10-08 NOTE — TELEPHONE ENCOUNTER
----- Message from Alexa GALDAMEZ sent at 10/4/2024  9:56 AM EDT -----  Regarding: Postpartum Call  delivered

## 2024-10-08 NOTE — TELEPHONE ENCOUNTER
Date of Delivery: 10/4  Delivering Provider: NP   Mode:   Delivery Notes/Complications: 3a Tear  Breastfeeding/Formula/Both? breast  postpartum visit scheduled? Not yet- added to appointment list.

## 2024-10-09 ENCOUNTER — TELEPHONE (OUTPATIENT)
Age: 24
End: 2024-10-09

## 2024-10-09 NOTE — PROGRESS NOTES
"Diagnoses and all orders for this visit:    Hemorrhoids, unspecified hemorrhoid type       Hemorrhoids  Patient presents for evaluation of perianal concerns.  Patient notes she had off-and-on concerns during her pregnancy.  She notes significant worsening of pain around the time of spontaneous vaginal delivery approximately 1 week ago.  Examination shows 2 thromboses.  1 in the left lateral 1 in the right lateral position.  These are relatively soft and both softball are then once up centimeter.    Based upon her presentation, I do not think there is much role for excision.  The clot burden itself seems to be relatively small and is on the soft side.  Warm soaks heat or cold would be reasonable for treatment.  High-fiber diet with adequate hydration was recommended for constipation.  We discussed that the clot burden will likely resolve and improve the pain.  She may be left with small remnant skin tags.  I do not recommend excision of these.  We discussed that excision of external thromboses or skin tags does not necessarily prevent more issues in the future.  She also discussed medication that she has procured from [place].  It sounds like she is describing a flea platonic.  We discussed this is reasonable to try and increase vascular tone to decrease problems in the future.  I do not think that will necessarily change her current care.  She will return to see me as needed.      HPI    Krysta Santos presents with concerns of painful hemorrhoids.     The patient notes 2 anal lumps throughout her pregnancy which increased in size and became painful during vaginal delivery on 10/4/2024. Using Hydrocortisone rectal cream, taking laxatives.     Admitted on 10/3/2024-10/6 for spontaneous vaginal delivery.    Family Medicine at [facility], visit on 10/8/2024: \" She has 2 very large external hemorrhoids that are causing a lot of pain or from sitting comfortably and also preventing her from moving her bowels properly. Hytone is " "given and she is advised sitz baths. She is referred to colorectal because of the size and symptoms associated with her postdelivery hemorrhoids.   Relevant Medications : hydrocortisone (HYTONE) 2.5 % cream. \"     Past Medical History:   Diagnosis Date    Anxiety 2024    specifically about nausea in pregnancy and labor    Depression 2020    discontinued medications in pregnancy    Family history of lupus anticoagulant disorder     father of patient    Family history of MTHFR deficiency     Father of patient-    Varicella 2004    had disease     Past Surgical History:   Procedure Laterality Date    LYMPHANGIOMA EXCISION  2014       Current Outpatient Medications:     hydrocortisone 1 % cream, Apply 1 Application topically daily as needed for irritation, Disp: , Rfl:     acetaminophen (TYLENOL) 325 mg tablet, Take 2 tablets (650 mg total) by mouth every 4 (four) hours as needed for mild pain, Disp: , Rfl:     benzocaine-menthol-lanolin-aloe (DERMOPLAST) 20-0.5 % topical spray, Apply 1 Application topically every 6 (six) hours as needed for irritation or mild pain, Disp: , Rfl:     Blood Glucose Monitoring Suppl (Contour Next Gen Monitor) w/Device KIT, Use 1 Device 4 (four) times a day as needed (as instructed), Disp: 1 kit, Rfl: 0    docusate sodium (COLACE) 100 mg capsule, Take 1 capsule (100 mg total) by mouth 2 (two) times a day, Disp: , Rfl:     ferrous sulfate 324 (65 Fe) mg, Take 1 tablet (324 mg total) by mouth daily before breakfast, Disp: 30 tablet, Rfl: 3    ibuprofen (MOTRIN) 600 mg tablet, Take 1 tablet (600 mg total) by mouth every 6 (six) hours, Disp: , Rfl:     Microlet Lancets MISC, Test 4 times daily, Disp: 100 each, Rfl: 4    ondansetron (ZOFRAN-ODT) 4 mg disintegrating tablet, TAKE 1 TABLET BY MOUTH EVERY 8 HOURS AS NEEDED FOR NAUSEA AND VOMITING, Disp: 20 tablet, Rfl: 5    pantoprazole (PROTONIX) 40 mg tablet, TAKE 1 TABLET BY MOUTH EVERY DAY, Disp: 30 tablet, Rfl: 5    " Ibtgnc-HfQsc-EsOxq-FA-CA-Omega (Complete Elisabeth DHA) 29-1-200 & 200 MG MISC, TAKE AS DIRECTED BY MOUTH EVERY DAY, Disp: , Rfl:     Prenatal MV-Min-Fe Fum-FA-DHA (Prenatal+DHA) 28-0.975 & 200 MG MISC, 1 tab po daily, Disp: 30 each, Rfl: 9    sertraline (Zoloft) 50 mg tablet, Take 0.5 tablets (25 mg total) by mouth daily (Patient taking differently: Take 50 mg by mouth daily), Disp: 90 tablet, Rfl: 3    witch hazel-glycerin (TUCKS) topical pad, Apply 1 Pad topically every 4 (four) hours as needed for irritation, Disp: , Rfl:   No current facility-administered medications for this visit.  Allergies as of 10/10/2024    (No Known Allergies)     Review of Systems   Gastrointestinal:  Positive for rectal pain.   All other systems reviewed and are negative.    There were no vitals filed for this visit.  Physical Exam  Constitutional:       Appearance: Normal appearance.   HENT:      Head: Normocephalic and atraumatic.   Eyes:      Extraocular Movements: Extraocular movements intact.      Pupils: Pupils are equal, round, and reactive to light.   Pulmonary:      Effort: Pulmonary effort is normal.   Abdominal:      General: Abdomen is flat.   Genitourinary:     Comments: Bilateral soft subcentimeter thrombosed external hemorrhoids  Skin:     General: Skin is warm.   Neurological:      General: No focal deficit present.      Mental Status: She is alert and oriented to person, place, and time.   Psychiatric:         Mood and Affect: Mood normal.         Behavior: Behavior normal.         Thought Content: Thought content normal.         Judgment: Judgment normal.

## 2024-10-09 NOTE — TELEPHONE ENCOUNTER
Patient delivered on 10/4. She needs to schedule a postpartum appt. Per Glenna encounter placed and office will call to schedule.

## 2024-10-09 NOTE — ANESTHESIA POSTPROCEDURE EVALUATION
Post-Op Assessment Note            No anethesia notable event occurred.            Last Filed PACU Vitals:  Vitals Value Taken Time   Temp     Pulse     BP     Resp     SpO2         Modified Keturah:  No data recorded       85

## 2024-10-10 ENCOUNTER — CONSULT (OUTPATIENT)
Age: 24
End: 2024-10-10
Payer: COMMERCIAL

## 2024-10-10 VITALS — BODY MASS INDEX: 20.43 KG/M2 | HEIGHT: 62 IN | WEIGHT: 111 LBS

## 2024-10-10 DIAGNOSIS — K64.9 HEMORRHOIDS, UNSPECIFIED HEMORRHOID TYPE: Primary | ICD-10-CM

## 2024-10-10 LAB — PLACENTA IN STORAGE: NORMAL

## 2024-10-10 PROCEDURE — 99203 OFFICE O/P NEW LOW 30 MIN: CPT | Performed by: COLON & RECTAL SURGERY

## 2024-10-10 NOTE — ASSESSMENT & PLAN NOTE
Patient presents for evaluation of perianal concerns.  Patient notes she had off-and-on concerns during her pregnancy.  She notes significant worsening of pain around the time of spontaneous vaginal delivery approximately 1 week ago.  Examination shows 2 thromboses.  1 in the left lateral 1 in the right lateral position.  These are relatively soft and both softball are then once up centimeter.    Based upon her presentation, I do not think there is much role for excision.  The clot burden itself seems to be relatively small and is on the soft side.  Warm soaks heat or cold would be reasonable for treatment.  High-fiber diet with adequate hydration was recommended for constipation.  We discussed that the clot burden will likely resolve and improve the pain.  She may be left with small remnant skin tags.  I do not recommend excision of these.  We discussed that excision of external thromboses or skin tags does not necessarily prevent more issues in the future.  She also discussed medication that she has procured from [place].  It sounds like she is describing a flea platonic.  We discussed this is reasonable to try and increase vascular tone to decrease problems in the future.  I do not think that will necessarily change her current care.  She will return to see me as needed.

## 2024-10-17 NOTE — TELEPHONE ENCOUNTER
Patient does not have questions and did not want to talk at this time- she is scheduled for PP visit on Tues 10/22

## 2024-10-24 ENCOUNTER — POSTPARTUM VISIT (OUTPATIENT)
Dept: OBGYN CLINIC | Facility: CLINIC | Age: 24
End: 2024-10-24

## 2024-10-24 VITALS — BODY MASS INDEX: 20.05 KG/M2 | DIASTOLIC BLOOD PRESSURE: 76 MMHG | SYSTOLIC BLOOD PRESSURE: 120 MMHG | WEIGHT: 109.6 LBS

## 2024-10-24 DIAGNOSIS — O24.410 DIET CONTROLLED GESTATIONAL DIABETES MELLITUS (GDM) IN THIRD TRIMESTER: ICD-10-CM

## 2024-10-24 PROBLEM — Z34.03 ENCOUNTER FOR SUPERVISION OF NORMAL FIRST PREGNANCY IN THIRD TRIMESTER: Status: RESOLVED | Noted: 2024-05-01 | Resolved: 2024-10-24

## 2024-10-24 PROBLEM — R82.71 GROUP B STREPTOCOCCAL BACTERIURIA: Status: RESOLVED | Noted: 2024-03-14 | Resolved: 2024-10-24

## 2024-10-24 PROBLEM — O21.9 NAUSEA/VOMITING IN PREGNANCY: Status: RESOLVED | Noted: 2024-03-14 | Resolved: 2024-10-24

## 2024-10-24 PROBLEM — O99.013 ANEMIA DURING PREGNANCY IN THIRD TRIMESTER: Status: RESOLVED | Noted: 2024-08-07 | Resolved: 2024-10-24

## 2024-10-24 PROBLEM — Z28.39 RUBELLA NON-IMMUNE STATUS, ANTEPARTUM: Status: RESOLVED | Noted: 2024-04-16 | Resolved: 2024-10-24

## 2024-10-24 PROBLEM — Z3A.38 38 WEEKS GESTATION OF PREGNANCY: Status: RESOLVED | Noted: 2024-03-14 | Resolved: 2024-10-24

## 2024-10-24 PROBLEM — O42.90 AMNIOTIC FLUID LEAKING: Status: RESOLVED | Noted: 2024-10-03 | Resolved: 2024-10-24

## 2024-10-24 PROBLEM — Z67.91 RH NEGATIVE STATE IN ANTEPARTUM PERIOD: Status: RESOLVED | Noted: 2024-04-16 | Resolved: 2024-10-24

## 2024-10-24 PROBLEM — U07.1 COVID-19 AFFECTING PREGNANCY IN SECOND TRIMESTER: Status: RESOLVED | Noted: 2024-07-31 | Resolved: 2024-10-24

## 2024-10-24 PROBLEM — O98.512 COVID-19 AFFECTING PREGNANCY IN SECOND TRIMESTER: Status: RESOLVED | Noted: 2024-07-31 | Resolved: 2024-10-24

## 2024-10-24 PROBLEM — O47.9 UTERINE CONTRACTIONS AT GREATER THAN 20 WEEKS OF GESTATION: Status: RESOLVED | Noted: 2024-10-02 | Resolved: 2024-10-24

## 2024-10-24 PROBLEM — O26.899 RH NEGATIVE STATE IN ANTEPARTUM PERIOD: Status: RESOLVED | Noted: 2024-04-16 | Resolved: 2024-10-24

## 2024-10-24 PROBLEM — O09.899 RUBELLA NON-IMMUNE STATUS, ANTEPARTUM: Status: RESOLVED | Noted: 2024-04-16 | Resolved: 2024-10-24

## 2024-10-24 PROCEDURE — 99024 POSTOP FOLLOW-UP VISIT: CPT | Performed by: STUDENT IN AN ORGANIZED HEALTH CARE EDUCATION/TRAINING PROGRAM

## 2024-10-24 RX ORDER — ESTRADIOL 0.1 MG/G
1 CREAM VAGINAL
Qty: 50 G | Refills: 3 | Status: SHIPPED | OUTPATIENT
Start: 2024-10-24 | End: 2025-10-09

## 2024-10-24 NOTE — ASSESSMENT & PLAN NOTE
-granulation tissue noted at 6oclock introitus. Silver nitrate applied  -estrace cream ordered to use every other day, will follow up in 2 weeks to assess laceration     Orders:    estradiol (ESTRACE VAGINAL) 0.1 mg/g vaginal cream; Insert 1 g into the vagina 4 (four) times a week

## 2024-10-24 NOTE — ASSESSMENT & PLAN NOTE
-doing well post partum   -discussed 12-28 month interdelivery interval, would like to use condoms and natural family planning at this time

## 2024-10-24 NOTE — PROGRESS NOTES
Ambulatory Visit  Name: Krysta Santos      : 2000      MRN: [id number]  Encounter Provider: Tram Montilla DO  Encounter Date: 10/24/2024   Encounter department: [facility] [place]    Assessment & Plan   (spontaneous vaginal delivery)  -doing well post partum   -discussed 12-28 month interdelivery interval, would like to use condoms and natural family planning at this time            Type 3a perineal laceration  -granulation tissue noted at 6oclock introitus. Silver nitrate applied  -estrace cream ordered to use every other day, will follow up in 2 weeks to assess laceration     Orders:    estradiol (ESTRACE VAGINAL) 0.1 mg/g vaginal cream; Insert 1 g into the vagina 4 (four) times a week    Postpartum depression  -EPDS 11, denies SI/HI  -stable on zoloft          Diet controlled gestational diabetes mellitus (GDM) in third trimester  -2hr GTT outstanding  Lab Results   Component Value Date    HGBA1C 4.7 2024              History of Present Illness     Krysta Santos is a 24 y.o. female  s/p  10/4/24 presents for 3 week post partum visit. Pain well controlled, minimal vaginal bleeding, voiding, having bowel movements, sleeping well, breast feeding without difficulty. Reports pain at laceration site and on left vaginal opening. Denies abnormal discharge.       Review of Systems   Constitutional:  Negative for appetite change, chills, fatigue and fever.   Respiratory:  Negative for cough, chest tightness, shortness of breath and wheezing.    Cardiovascular:  Negative for chest pain, palpitations and leg swelling.   Gastrointestinal:  Negative for abdominal distention, abdominal pain, constipation, diarrhea, nausea and vomiting.   Endocrine: Negative for cold intolerance, heat intolerance and polyuria.   Genitourinary:  Negative for difficulty urinating, dyspareunia, dysuria, genital sores, menstrual problem, vaginal bleeding, vaginal discharge and  vaginal pain.   Neurological:  Negative for dizziness, weakness, light-headedness and headaches.           Objective     /76   Wt 49.7 kg (109 lb 9.6 oz)   LMP 01/09/2024 (Exact Date)   Breastfeeding Yes   BMI 20.05 kg/m²     Physical Exam  Constitutional:       General: She is not in acute distress.     Appearance: Normal appearance. She is not ill-appearing.   Cardiovascular:      Rate and Rhythm: Normal rate.   Pulmonary:      Effort: Pulmonary effort is normal. No respiratory distress.   Abdominal:      General: Abdomen is flat. There is no distension.      Palpations: Abdomen is soft.      Tenderness: There is no abdominal tenderness. There is no guarding or rebound.   Genitourinary:     General: Normal vulva.      Exam position: Lithotomy position.      Labia:         Right: No rash, tenderness, lesion or injury.         Left: No rash, tenderness, lesion or injury.       Vagina: Normal. No foreign body. No vaginal discharge, erythema, tenderness, bleeding or lesions.      Cervix: Normal. No cervical motion tenderness, discharge, friability, lesion, erythema, cervical bleeding or eversion.      Uterus: Normal. Not enlarged, not fixed, not tender and no uterine prolapse.       Adnexa: Right adnexa normal and left adnexa normal.        Right: No mass, tenderness or fullness.          Left: No mass, tenderness or fullness.        Comments: 3A laceration healing well, small area of granulation tissue present at 6 oclock, suture intact.   Musculoskeletal:      Right lower leg: No edema.      Left lower leg: No edema.   Neurological:      General: No focal deficit present.      Mental Status: She is alert and oriented to person, place, and time.   Psychiatric:         Mood and Affect: Mood normal.         Behavior: Behavior normal.         Thought Content: Thought content normal.         Judgment: Judgment normal.

## 2024-11-06 ENCOUNTER — OFFICE VISIT (OUTPATIENT)
Dept: POSTPARTUM | Facility: CLINIC | Age: 24
End: 2024-11-06

## 2024-11-06 NOTE — PROGRESS NOTES
INITIAL BREAST FEEDING EVALUATION    Informant/Relationship: Krysta (mom/self)     Discussion of General Lactation Issues: Krysta is reporting that she is having concerns with Roger getting enough to eat on the breast. Baby is often crying or fussing after feedings, and while she feels he feeds when when on the breast, she and other care gives feel that he should be more relaxed after feeding. When he is offered the breast again, he is often pushing away or not latching calmly. She has started pumping and bottle feeding to see what he is getting. He cries less when getting a bottle.     Infant is 4 weeks and 5 days  old today.        History:  Fertility Problem:no  Breast changes:yes - enlargement, color changes, tenderness   : yes - everything went well, she went into labor spontaneously. She was in labor for 15 hours total, pushed for about 1 hour.   Full term:yes - 38 and 3    labor:no  First nursing/attempt < 1 hour after birth:yes - good latch  Skin to skin following delivery:yes - immediately   Breast changes after delivery:yes - 3-4 days postpartum   Rooming in (infant in room with mother with exception of procedures, eg. Circumcision: yes - entire stay   Blood sugar issues:no  NICU stay:no  Jaundice:no  Phototherapy:no  Supplement given: (list supplement and method used as well as reason(s):no    Past Medical History:   Diagnosis Date    Anxiety     specifically about nausea in pregnancy and labor    Depression     discontinued medications in pregnancy    Family history of lupus anticoagulant disorder     father of patient    Family history of MTHFR deficiency     Father of patient-    Varicella     had disease         Current Outpatient Medications:     docusate sodium (COLACE) 100 mg capsule, Take 1 capsule (100 mg total) by mouth 2 (two) times a day, Disp: , Rfl:     estradiol (ESTRACE VAGINAL) 0.1 mg/g vaginal cream, Insert 1 g into the vagina 4 (four) times a week,  Disp: 50 g, Rfl: 3    ferrous sulfate 324 (65 Fe) mg, Take 1 tablet (324 mg total) by mouth daily before breakfast, Disp: 30 tablet, Rfl: 3    hydrocortisone 1 % cream, Apply 1 Application topically daily as needed for irritation, Disp: , Rfl:     ondansetron (ZOFRAN-ODT) 4 mg disintegrating tablet, TAKE 1 TABLET BY MOUTH EVERY 8 HOURS AS NEEDED FOR NAUSEA AND VOMITING, Disp: 20 tablet, Rfl: 5    pantoprazole (PROTONIX) 40 mg tablet, TAKE 1 TABLET BY MOUTH EVERY DAY, Disp: 30 tablet, Rfl: 5    Sqanwn-KyTvq-UyXiv-FA-CA-Omega (Complete  DHA) 29-1-200 & 200 MG MISC, TAKE AS DIRECTED BY MOUTH EVERY DAY, Disp: , Rfl:     Prenatal MV-Min-Fe Fum-FA-DHA (Prenatal+DHA) 28-0.975 & 200 MG MISC, 1 tab po daily, Disp: 30 each, Rfl: 9    sertraline (Zoloft) 50 mg tablet, Take 0.5 tablets (25 mg total) by mouth daily (Patient taking differently: Take 50 mg by mouth daily), Disp: 90 tablet, Rfl: 3    No Known Allergies    Social History     Substance and Sexual Activity   Drug Use Never       Social History     Interval Breastfeeding History:    Frequency of breast feedin-2.5 h during the day, up to 4.5 hours during the night, 20 minute feeding   Does mother feel breastfeeding is effective: If no, explain: unsure, having some doubts   Does infant appear satisfied after nursing:Yes - he falls asleep but after 10 minutes he seems to show cues again, it can take up to an hour   Stooling pattern normal: lYes  Urinating frequently:Yes  Using shield or shells: No    Alternative/Artificial Feedings:   Bottle: Yes, Helena Sangita, using evenflo nipple   Cup: No  Syringe/Finger: No           Formula Type: no                     Amount: n/a            Breast Milk:                      Amount: 2.5 ounces, will top off on the breast if needed             Frequency Q 50/50 breast and bottle feedings   Elimination Problems: No      Equipment:    Pump            Type: Spectra, using only manual pump at this time             Frequency of  Use: to replace bottle feeding sessions, 4 times per day.     Output varies, about 4 ounces per pump.       Equipment Problems: no    Mom:  Breast: Normal, rounded, symmetrical, full feeling glandular tissue  throughout.   Nipple Assessment in General: Normal: elongated/eraser, no discoloration and no damage noted.  Mother's Awareness of Feeding Cues                 Recognizes: difficult at times to tell when he is full                   Verbalizes: Yes  Support System: FOB, lives with in-laws.   History of Breastfeeding: first time breastfeeding   Changes/Stressors/Violence: fussy at times when breastfeeding, Mom is not confident he is getting what he needs from the breast. She is still learning to read his cues.    Concerns/Goals: Krysta desires to do more direct breastfeeding.     Problems with Mom: first time breastfeeding, gaining confidence     Physical Exam  Constitutional:       Appearance: Normal appearance.   HENT:      Head: Normocephalic.   Pulmonary:      Effort: Pulmonary effort is normal.   Musculoskeletal:         General: Normal range of motion.      Cervical back: Normal range of motion.   Neurological:      General: No focal deficit present.      Mental Status: She is alert and oriented to person, place, and time.   Skin:     General: Skin is warm and dry.      Capillary Refill: Capillary refill takes less than 2 seconds.   Psychiatric:         Mood and Affect: Mood normal.         Behavior: Behavior normal.         Thought Content: Thought content normal.         Judgment: Judgment normal.       Infant:  Behaviors: Alert  Color: Pink  Birth weight: 3010 g   Current weight: 4165 g     Problems with infant: fussy after feedings at times       General Appearance:  Alert, active, no distress                             Head:  Normocephalic, AFOF, sutures opposed                             Eyes:  Conjunctiva clear, no drainage                              Ears:  Normally placed, no anomolies                              Nose:  Septum intact, no drainage or erythema                           Mouth:  No lesions. Tongue is at rest at the roof of his mouth, bowl' shaped when crying, lateralizes well. Wide gape. Full cup on gloved finger, compresses but peristasis felt with chin and cheek support. Manual lift shows rounded tongue tip, frenulum connects well behind alveolar ridge and tongue tip.                     Neck:  Supple, symmetrical, trachea midline                 Respiratory:  No grunting, flaring, retractions, breath sounds clear and equal            Cardiovascular:  Regular rate and rhythm. No murmur. Adequate perfusion/capillary refill. Femoral pulse present                    Abdomen:   Soft, non-tender, no masses, bowel sounds present, no HSM             Genitourinary:  Normal male, testes descended, no discharge, swelling, or pain, anus patent                          Spine:   No abnormalities noted        Musculoskeletal:  Full range of motion          Skin/Hair/Nails:   Skin warm, dry, and intact, no rashes or abnormal dyspigmentation or lesions                Neurologic:   No abnormal movement, tone appropriate for gestational age     Latch:  Efficiency:               Lips Flanged: Yes              Depth of latch: wide              Audible Swallow: Yes              Visible Milk: Yes              Wide Open/ Asymmetrical: Yes              Suck Swallow Cycle: Breathing: yes, Coordinated: yes  Nipple Assessment after latch: Normal: elongated/eraser, no discoloration and no damage noted.  Latch Problems: He latches well, drinks actively and falls asleep on the breast. He is fussy post feeding, but calms with rocking and pacifier. If offered the breast he is falling asleep and not showing hunger cues or becomes frustrated. Once Mom is standing and gently rocking, and lights dimmed, he is calm. He is calm and sleeping in stroller when leaving the visit.     +60 mL after nursing on the second  breast, his feeding started before the visit in the waiting area.     Position:  Infant's Ergonomics/Body               Body Alignment: Yes               Head Supported: Yes               Close to Mom's body/ Lifted/ Supported: Yes               Mom's Ergonomics/Body: Yes                           Supported: Yes                           Sitting Back: Yes                           Brings Baby to her breast: Yes  Positioning Problems: Reviewed BN hold and Mom returns this demonstration well.       Education:  Reviewed Latch: importance of deep latch without pain.   Reviewed Positioning for Dyad: proper alignment and head angle when positioning at the breast   Reviewed Frequency/Supply & Demand: offer the breast at each feeding, pump if baby is not latching and effective transferring milk.   Reviewed Infant:Cues and varied States of Awareness: watch for hunger cues, feed on demand. If baby seems satisfied at the breast (calm, relaxed sleeping, breasts are softer) no need to pump or supplement   Reviewed Infant Elimination: goal of 6+ wets and 2-3 stools per day   Reviewed Alternative/Artificial Feedings: paced bottle feeding technique demonstrated  Reviewed Mom/Breast care: gentle handling of the breast at all times, as well as tips for healing sore nipples.    Reviewed Equipment: Hand pump and electric pump general guidance, Discussed proper flange fit, how to measure        Plan:      Reassurance provided that baby is growing well at this time, acknowledged Mom's goals and commitment to breastfeeding. Cont with positioning adjustments and watch for signs of effective feeding when on the breast, as well as signs baby is not interested in feeding and may need a lower stimulation environment to settle. Pump only if wanting to replace feeding at the breast with bottle feeding or if latching becomes painful. Gentle handling of the breast at all times to preserve integrity.  Contact [facility] for breastfeeding  support as needed or ongoing concerns with latching comfort and milk transfer.      I have spent 90 minutes with Patient and family today in which greater than 50% of this time was spent in counseling/coordination of care regarding Patient and family education.

## 2024-11-06 NOTE — PATIENT INSTRUCTIONS
-Continue to nurse Roger on demand! He is growing beautifully!   -Watch for signs throughout the feeding that baby is effectively removing milk. You will feel strong tugging, hear swallowing and will feel your breasts get softer after nursing.   -if he slows down active drinking, you can switch him to the second breast. Offer to four breasts per feeding as needed based on his cues.   -No need to pump if baby is latching and feeding well at the breast on demand.   -Pump only as needed for missed feedings at the breast or for uncomfortable engorgement, utilize flange fit and settings that are comfortable for you, pumping should not be painful!   - Storing and Thawing Breast Milk  [facility] Breastfeeding Support (usda.gov)    -Please call or scheduled a follow up appointment with lactation as needed for questions or concerns you may have.     -The book we discussed today is the Suckle, Sleep, Thrive by Toby Townsend and Corin Dejesus. This can help give some insights into Roger's temperament and how to read his cues.   -Follow up as needed for ongoing feeding support.

## 2024-11-08 ENCOUNTER — POSTPARTUM VISIT (OUTPATIENT)
Dept: OBGYN CLINIC | Facility: MEDICAL CENTER | Age: 24
End: 2024-11-08

## 2024-11-08 VITALS
BODY MASS INDEX: 20.17 KG/M2 | WEIGHT: 109.6 LBS | SYSTOLIC BLOOD PRESSURE: 100 MMHG | HEIGHT: 62 IN | DIASTOLIC BLOOD PRESSURE: 70 MMHG

## 2024-11-08 PROCEDURE — 99024 POSTOP FOLLOW-UP VISIT: CPT | Performed by: STUDENT IN AN ORGANIZED HEALTH CARE EDUCATION/TRAINING PROGRAM

## 2024-11-08 NOTE — PROGRESS NOTES
"Ambulatory Visit  Name: Krysta Santos      : 2000      MRN: [id number]  Encounter Provider: Tram Montilla DO  Encounter Date: 2024   Encounter department: [facility] [place]    Assessment & Plan  Type 3a perineal laceration  -granulation tissue resolved with estrace, healing appropriately. Can discontinue estrace cream after 2 more weeks of twice weekly.   -doing well post partum   -follow up prn/annual           History of Present Illness     Krysta Santos is a 24 y.o. female  s/p  10/4/24 presents for perineal laceration check. On visit 10/24/24, was noted to have granulation tissue which was treated with silver nitrate. Plan was for estrace cream daily for 2 weeks to assist with healing. Pt reports area is no longer painful and has not noticed any abnormal bleeding or discharge from laceration site.       Review of Systems   Constitutional:  Negative for appetite change, chills, fatigue and fever.   Respiratory:  Negative for cough, chest tightness, shortness of breath and wheezing.    Cardiovascular:  Negative for chest pain, palpitations and leg swelling.   Gastrointestinal:  Negative for abdominal distention, abdominal pain, constipation, diarrhea, nausea and vomiting.   Endocrine: Negative for cold intolerance, heat intolerance and polyuria.   Genitourinary:  Negative for difficulty urinating, dyspareunia, dysuria, genital sores, menstrual problem, vaginal bleeding, vaginal discharge and vaginal pain.   Neurological:  Negative for dizziness, weakness, light-headedness and headaches.           Objective     /70 (BP Location: Left arm, Patient Position: Sitting, Cuff Size: Adult)   Ht 5' 2\" (1.575 m)   Wt 49.7 kg (109 lb 9.6 oz)   LMP 2024 (Exact Date)   Breastfeeding Yes   BMI 20.05 kg/m²     Physical Exam  Constitutional:       General: She is not in acute distress.     Appearance: Normal appearance. She is not ill-appearing. "   Cardiovascular:      Rate and Rhythm: Normal rate.   Pulmonary:      Effort: Pulmonary effort is normal. No respiratory distress.   Abdominal:      General: Abdomen is flat. There is no distension.      Palpations: Abdomen is soft.      Tenderness: There is no abdominal tenderness. There is no guarding or rebound.   Genitourinary:     General: Normal vulva.      Labia:         Right: No rash, tenderness, lesion or injury.         Left: No rash, tenderness, lesion or injury.       Comments: 3A laceration healing appropriately, suture present  Musculoskeletal:      Right lower leg: No edema.      Left lower leg: No edema.   Neurological:      General: No focal deficit present.      Mental Status: She is alert and oriented to person, place, and time.   Psychiatric:         Mood and Affect: Mood normal.         Behavior: Behavior normal.         Thought Content: Thought content normal.         Judgment: Judgment normal.

## 2024-11-08 NOTE — ASSESSMENT & PLAN NOTE
-granulation tissue resolved with estrace, healing appropriately. Can discontinue estrace cream after 2 more weeks of twice weekly.   -doing well post partum   -follow up prn/annual

## 2024-11-21 ENCOUNTER — NURSE TRIAGE (OUTPATIENT)
Age: 24
End: 2024-11-21

## 2024-11-21 DIAGNOSIS — B37.89 YEAST INFECTION OF NIPPLE, POSTPARTUM: Primary | ICD-10-CM

## 2024-11-21 RX ORDER — CLOTRIMAZOLE 1 %
CREAM (GRAM) TOPICAL 2 TIMES DAILY
Qty: 10 G | Refills: 0 | Status: SHIPPED | OUTPATIENT
Start: 2024-11-21

## 2024-11-21 NOTE — TELEPHONE ENCOUNTER
"Spoke with patient who reports her baby was dx with thrush today and she didn't realize the white on her nipples was from that.  She reports it started about a week ago.  She denies pain. She does advise her nipples turn purple when in the shower but it does go away after.  Allergies and pharmacy confirmed with pt.     Reason for Disposition   [1] Sore nipples AND [2] baby has been treated for thrush    Additional Information   [1] Mom has sore nipples AND [2] baby has untreated symptoms of thrush   Breastfeeding questions about mother (breast symptoms or feeling sick)    Answer Assessment - Initial Assessment Questions  1. BREASTFEEDING: \"Are you currently breastfeeding?\" If No: \"When did you stop?\"      Yes   2. DELIVERY: \"When was the baby born?\"      10/4  3. MAIN CONCERN: \"What is your main concern today?\" (e.g., breast symptoms, medication question)      Nipples are white and baby was dx thrush   4. BREAST PAIN: \"Are you having breast pain?\" If Yes, \"How bad is the pain?\" (Scale 1-10; or mild, moderate, severe). If Yes, \"Which breast?\" (e.g., left, right, both)    - MILD - doesn't interfere with normal activities    - MODERATE - interferes with normal activities or awakens from sleep    - SEVERE - excruciating pain, unable to do any normal activities      denies  5. REDNESS: \"Does the skin on the breast appear red?\"      She states her nipples turn purple when in the shower.    6. FEVER: \"Do you have a fever?\" If so, ask: \"What is it, how was it measured, and when did it start?\"      enies  7. OTHER SYMPTOMS: \"Do you have any other symptoms?\" (e.g., weakness, abdominal pain)      denies    Protocols used: Postpartum - Breastfeeding Guideline Selection-ADULT-[place], Breastfeeding - Mother's Breast Symptoms or Illness-Pediatric-[facility]    "

## 2024-12-11 ENCOUNTER — TELEPHONE (OUTPATIENT)
Age: 24
End: 2024-12-11

## 2024-12-11 NOTE — TELEPHONE ENCOUNTER
Patient calling in stating that she's having pain where her stitches were after her perineal tear from delivery. Pt statin that it hurts with sexual intercourse.     Pt is scheduled for 12/18/24 in [facility] as pt is requesting an appt.     Pt is requesting to have testing for diabetes completed as pt had gestational diabetes in the pregnancy and pt is requesting for testing to be completed.

## 2024-12-11 NOTE — TELEPHONE ENCOUNTER
Called patient, advised 2 Hr glucose test is ordered and must fast for this test. She verbalized understanding and offers no further questions or concerns at this time.

## 2024-12-18 ENCOUNTER — OFFICE VISIT (OUTPATIENT)
Dept: OBGYN CLINIC | Facility: MEDICAL CENTER | Age: 24
End: 2024-12-18
Payer: COMMERCIAL

## 2024-12-18 VITALS — DIASTOLIC BLOOD PRESSURE: 76 MMHG | SYSTOLIC BLOOD PRESSURE: 100 MMHG | WEIGHT: 109 LBS | BODY MASS INDEX: 19.94 KG/M2

## 2024-12-18 DIAGNOSIS — M62.89 PELVIC FLOOR DYSFUNCTION IN FEMALE: ICD-10-CM

## 2024-12-18 DIAGNOSIS — N94.10 DYSPAREUNIA IN FEMALE: ICD-10-CM

## 2024-12-18 PROCEDURE — 99213 OFFICE O/P EST LOW 20 MIN: CPT | Performed by: STUDENT IN AN ORGANIZED HEALTH CARE EDUCATION/TRAINING PROGRAM

## 2024-12-18 NOTE — PROGRESS NOTES
Assessment/Plan:   Dyspareunia in female  Exam with well healed laceration. Left sidewall with tenderness to palpation  Reviewed recommendation for PFPT  Discussed option to restart estrace, she will consider- concerned it may decrease her milk supply    Postpartum depression  EPDS 13, denies SI/HI  Would like to trial increase in her zofoft- currently taking 25 mg   Component of grief  Needs to check with insurance on options- [facility] does not take her insurance     Diagnoses and all orders for this visit:    Type 3a perineal laceration  -     Ambulatory Referral to Physical Therapy; Future    Dyspareunia in female  -     Ambulatory Referral to Physical Therapy; Future    Pelvic floor dysfunction in female  -     Ambulatory Referral to Physical Therapy; Future    Postpartum depression          Subjective:     Patient ID: Krysta Santos is a 24 y.o. female.    23 yo  10 wk PP from  with 3 a laceration here for follow up with complaints of pain with intercourse. Feels very tender like things might split. Feel like there is a wall there blocking things. Reports her mood is up and down. Attributes some to the unexpected loss of her dad around the time of delivery. She tried to see [facility] but her insurance was not accepted so she needs to check for alternative therapists.        Review of Systems   Constitutional:  Negative for chills and fever.   HENT:  Negative for ear pain and sore throat.    Eyes:  Negative for pain and visual disturbance.   Respiratory:  Negative for cough and shortness of breath.    Cardiovascular:  Negative for chest pain and palpitations.   Gastrointestinal:  Negative for abdominal pain, constipation, diarrhea, nausea and vomiting.   Genitourinary:  Positive for dyspareunia and vaginal pain. Negative for dysuria, frequency, hematuria, menstrual problem, urgency, vaginal bleeding and vaginal discharge.   Musculoskeletal:  Negative for arthralgias and back pain.   Skin:  Negative  for color change and rash.   Neurological:  Negative for seizures and syncope.   Psychiatric/Behavioral:  Positive for dysphoric mood. Negative for agitation, behavioral problems and self-injury. The patient is nervous/anxious.    All other systems reviewed and are negative.        Objective:     Physical Exam  Vitals reviewed.   Constitutional:       General: She is not in acute distress.     Appearance: She is well-developed. She is not diaphoretic.   HENT:      Head: Normocephalic and atraumatic.   Pulmonary:      Effort: Pulmonary effort is normal. No respiratory distress.   Genitourinary:     Comments: Small amount of asymetry however otherwise well healed. Hypoestrogenic at the posterior vagina. Tender to palpation along the left vaginal sidewall consistent with muscle tension  Musculoskeletal:      Cervical back: Normal range of motion.   Neurological:      Mental Status: She is alert and oriented to person, place, and time.   Psychiatric:         Behavior: Behavior normal.         Thought Content: Thought content normal.         Judgment: Judgment normal.

## 2024-12-18 NOTE — ASSESSMENT & PLAN NOTE
Exam with well healed laceration. Left sidewall with tenderness to palpation  Reviewed recommendation for PFPT  Discussed option to restart estrace, she will consider- concerned it may decrease her milk supply

## 2024-12-18 NOTE — ASSESSMENT & PLAN NOTE
EPDS 13, denies SI/HI  Would like to trial increase in her zofoft- currently taking 25 mg   Component of grief  Needs to check with insurance on options- [facility] does not take her insurance

## 2025-02-12 ENCOUNTER — ANNUAL EXAM (OUTPATIENT)
Age: 25
End: 2025-02-12
Payer: COMMERCIAL

## 2025-02-12 VITALS — WEIGHT: 106 LBS | DIASTOLIC BLOOD PRESSURE: 74 MMHG | SYSTOLIC BLOOD PRESSURE: 106 MMHG | BODY MASS INDEX: 19.39 KG/M2

## 2025-02-12 DIAGNOSIS — Z01.419 ENCOUNTER FOR ANNUAL ROUTINE GYNECOLOGICAL EXAMINATION: ICD-10-CM

## 2025-02-12 DIAGNOSIS — Z28.39: ICD-10-CM

## 2025-02-12 DIAGNOSIS — N94.10 DYSPAREUNIA IN FEMALE: ICD-10-CM

## 2025-02-12 PROBLEM — K64.4 EXTERNAL HEMORRHOIDS WITH COMPLICATION: Status: ACTIVE | Noted: 2024-10-08

## 2025-02-12 PROBLEM — J30.2 SEASONAL ALLERGIC RHINITIS: Status: ACTIVE | Noted: 2024-10-08

## 2025-02-12 PROCEDURE — 99395 PREV VISIT EST AGE 18-39: CPT

## 2025-02-12 RX ORDER — ESTRADIOL 0.1 MG/G
1 CREAM VAGINAL 2 TIMES WEEKLY
Qty: 42.5 G | Refills: 0 | Status: SHIPPED | OUTPATIENT
Start: 2025-02-13 | End: 2026-01-29

## 2025-02-12 NOTE — PROGRESS NOTES
Name: Krysta Santos      : 2000      MRN: [id number]  Encounter Provider: Beth Chang PA-C  Encounter Date: 2025   Encounter department: [facility] [place]  :  Assessment & Plan  Postpartum depression    Patient has been taking 50 mg of Zoloft since December and does not notice a significant improvement in her symptoms.  Increased Zoloft from 50mg to 75 mg   EPDS: 9  Patient working on finding a talk therapist that accepts her insurance.  Suggested [email or web address]  Encourage patient to set up an appointment to follow-up with PCP in 2 to 3 months for further management of her antidepressant  Dyspareunia in female    Continue Estrace cream  Encourage pelvic floor physical therapy  Encouraged lubricant use with intercourse    Encounter for annual routine gynecological examination    Pap every 3 years if normal.  STI testing as indicated.  Exercise most days of week-minimum of 150-300 minutes per week.   Obtain appropriate diet and hydration.   Continue prenatal vitamin  HPV 9 vaccine series recommended through age 45. Check with your insurance for coverage. If covered, call office to schedule start of vaccine series.    Call your insurance company to verify coverage prior to completing any ordered tests.   Return to office in one year or sooner, if needed.       Type 3a perineal laceration    Orders:    estradiol (ESTRACE VAGINAL) 0.1 mg/g vaginal cream; Insert 1 g into the vagina 2 (two) times a week    Rubella non-immune status, delivered, current hospitalization    Discussed with patient her rubella nonimmune status.  Advised to follow-up with PCP regarding immunization        History of Present Illness   HPI  Krysta Santos is a 24 y.o. female who presents for an annual exam    Vaginal delivery 2024 had a 3A laceration and still notes pain with intercourse. She was referred to pelvic floor PT which she didn't go to and has not been using  estrace cream because she lost the cream.  She states that the pain is more towards the left side. 3/10.  The pain has improved over time but is still present.    She is breastfeeding without complications    LMP amenorrheic   Sexually active Yes, Monogamous  Birth control condoms  History of abnormal pap: No  Last pap 2024 , Findings NILM , Next pap: 2029  Self breast exam No  HPV vaccine series completed: No  Rubella non-immune    Notes vulvar itching that comes and goes on its own, not significantly bothersome    Currently taking 50 mg daily, Denies SI/HI. She denies any negative feelings towards baby. She states that her main contributor to her depression is her Father passing away suddenly around the time of delivery. Working on finding a therapist that will accept her insurance    Hereditary Cancer Screening  FH Breast/Ovarian cancer: none  FH Uterine cancer: none  FH Colon cancer: none  Cancer-related family history includes Cancer in her paternal grandfather and paternal grandmother.      Review of Systems   Constitutional:  Negative for chills, fatigue, fever and unexpected weight change.   Eyes:  Negative for visual disturbance.   Respiratory:  Negative for shortness of breath.    Cardiovascular:  Negative for chest pain and palpitations.   Gastrointestinal:  Negative for abdominal pain, anal bleeding, blood in stool, constipation, diarrhea, nausea and vomiting.   Endocrine: Negative for cold intolerance and heat intolerance.   Genitourinary:  Positive for dyspareunia. Negative for difficulty urinating, dysuria, frequency, hematuria, pelvic pain, urgency, vaginal bleeding, vaginal discharge and vaginal pain.   Musculoskeletal:  Negative for back pain.   Skin:  Negative for rash.   Neurological:  Positive for light-headedness (thinks it may be due to lack of sleep). Negative for dizziness, syncope and headaches.   Hematological:  Does not bruise/bleed easily.   Psychiatric/Behavioral:  Negative for  dysphoric mood. The patient is not nervous/anxious.      Current Outpatient Medications on File Prior to Visit   Medication Sig Dispense Refill    ferrous sulfate 324 (65 Fe) mg Take 1 tablet (324 mg total) by mouth daily before breakfast 30 tablet 3    pantoprazole (PROTONIX) 40 mg tablet TAKE 1 TABLET BY MOUTH EVERY DAY 30 tablet 5    Fszige-NgNes-NvNie-FA-CA-Omega (Complete  DHA) 29-1-200 & 200 MG MISC TAKE AS DIRECTED BY MOUTH EVERY DAY      Prenatal MV-Min-Fe Fum-FA-DHA (Prenatal+DHA) 28-0.975 & 200 MG MISC 1 tab po daily 30 each 9    sertraline (Zoloft) 50 mg tablet Take 0.5 tablets (25 mg total) by mouth daily (Patient taking differently: Take 100 mg by mouth daily) 90 tablet 3    clotrimazole (LOTRIMIN) 1 % cream Apply topically 2 (two) times a day 10 g 0    docusate sodium (COLACE) 100 mg capsule Take 1 capsule (100 mg total) by mouth 2 (two) times a day (Patient not taking: Reported on 2024)      estradiol (ESTRACE VAGINAL) 0.1 mg/g vaginal cream Insert 1 g into the vagina 4 (four) times a week 50 g 3    hydrocortisone 1 % cream Apply 1 Application topically daily as needed for irritation (Patient not taking: Reported on 2024)      ondansetron (ZOFRAN-ODT) 4 mg disintegrating tablet TAKE 1 TABLET BY MOUTH EVERY 8 HOURS AS NEEDED FOR NAUSEA AND VOMITING (Patient not taking: Reported on 2025) 20 tablet 5     No current facility-administered medications on file prior to visit.      Social History     Tobacco Use    Smoking status: Never    Smokeless tobacco: Never    Tobacco comments:     None   Vaping Use    Vaping status: Never Used   Substance and Sexual Activity    Alcohol use: Not Currently     Comment: occasional- rarely- not with pregnancy    Drug use: Never    Sexual activity: Yes     Partners: Male     Birth control/protection: None        Objective   /74 (BP Location: Left arm, Patient Position: Sitting, Cuff Size: Standard)   Wt 48.1 kg (106 lb)   Breastfeeding Yes    BMI 19.39 kg/m²      Physical Exam  Vitals and nursing note reviewed.   Constitutional:       General: She is not in acute distress.     Appearance: She is well-developed.   HENT:      Head: Normocephalic and atraumatic.   Eyes:      Extraocular Movements: Extraocular movements intact.   Pulmonary:      Effort: Pulmonary effort is normal.   Chest:   Breasts:     Right: No swelling, bleeding, inverted nipple, mass, nipple discharge, skin change or tenderness.      Left: No swelling, bleeding, inverted nipple, mass, nipple discharge, skin change or tenderness.   Abdominal:      Palpations: Abdomen is soft.      Tenderness: There is no abdominal tenderness.      Hernia: There is no hernia in the left inguinal area or right inguinal area.   Genitourinary:     General: Normal vulva.      Exam position: Lithotomy position.      Pubic Area: No rash.       Labia:         Right: No rash, tenderness or lesion.         Left: No rash, tenderness or lesion.       Urethra: No urethral pain or urethral swelling.      Vagina: No vaginal discharge, erythema, tenderness, bleeding or lesions.      Cervix: No cervical motion tenderness, discharge, friability, lesion, erythema or cervical bleeding.      Uterus: Not enlarged and not tender.       Adnexa:         Right: No mass, tenderness or fullness.          Left: No mass, tenderness or fullness.         Lymphadenopathy:      Upper Body:      Right upper body: No supraclavicular, axillary or pectoral adenopathy.      Left upper body: No supraclavicular, axillary or pectoral adenopathy.      Lower Body: No right inguinal adenopathy. No left inguinal adenopathy.   Skin:     General: Skin is warm and dry.   Neurological:      Mental Status: She is alert.   Psychiatric:         Mood and Affect: Mood normal.         Behavior: Behavior normal.         Beth Chang PA-C

## 2025-02-12 NOTE — ASSESSMENT & PLAN NOTE
Continue Estrace cream  Encourage pelvic floor physical therapy  Encouraged lubricant use with intercourse

## 2025-02-12 NOTE — ASSESSMENT & PLAN NOTE
Patient has been taking 50 mg of Zoloft since December and does not notice a significant improvement in her symptoms.  Increased Zoloft from 50mg to 75 mg   EPDS: 9  Patient working on finding a talk therapist that accepts her insurance.  [facility]ed [email or web address]  Encourage patient to set up an appointment to follow-up with PCP in 2 to 3 months for further management of her antidepressant

## 2025-04-02 ENCOUNTER — PREP FOR PROCEDURE (OUTPATIENT)
Age: 25
End: 2025-04-02

## 2025-04-02 ENCOUNTER — APPOINTMENT (OUTPATIENT)
Age: 25
End: 2025-04-02
Payer: COMMERCIAL

## 2025-04-02 ENCOUNTER — OFFICE VISIT (OUTPATIENT)
Age: 25
End: 2025-04-02
Payer: COMMERCIAL

## 2025-04-02 VITALS
WEIGHT: 109 LBS | OXYGEN SATURATION: 98 % | BODY MASS INDEX: 20.06 KG/M2 | SYSTOLIC BLOOD PRESSURE: 108 MMHG | HEIGHT: 62 IN | HEART RATE: 88 BPM | DIASTOLIC BLOOD PRESSURE: 70 MMHG

## 2025-04-02 DIAGNOSIS — R05.3 CHRONIC COUGH: ICD-10-CM

## 2025-04-02 DIAGNOSIS — K21.9 GASTROESOPHAGEAL REFLUX DISEASE, UNSPECIFIED WHETHER ESOPHAGITIS PRESENT: ICD-10-CM

## 2025-04-02 DIAGNOSIS — K21.9 GASTROESOPHAGEAL REFLUX DISEASE, UNSPECIFIED WHETHER ESOPHAGITIS PRESENT: Primary | ICD-10-CM

## 2025-04-02 LAB — IGA SERPL-MCNC: 157 MG/DL (ref 66–433)

## 2025-04-02 PROCEDURE — 82785 ASSAY OF IGE: CPT

## 2025-04-02 PROCEDURE — 99204 OFFICE O/P NEW MOD 45 MIN: CPT | Performed by: PHYSICIAN ASSISTANT

## 2025-04-02 PROCEDURE — 86003 ALLG SPEC IGE CRUDE XTRC EA: CPT

## 2025-04-02 PROCEDURE — 82784 ASSAY IGA/IGD/IGG/IGM EACH: CPT

## 2025-04-02 PROCEDURE — 36415 COLL VENOUS BLD VENIPUNCTURE: CPT

## 2025-04-02 PROCEDURE — 86364 TISS TRNSGLTMNASE EA IG CLAS: CPT

## 2025-04-02 RX ORDER — SODIUM CHLORIDE, SODIUM LACTATE, POTASSIUM CHLORIDE, CALCIUM CHLORIDE 600; 310; 30; 20 MG/100ML; MG/100ML; MG/100ML; MG/100ML
125 INJECTION, SOLUTION INTRAVENOUS CONTINUOUS
OUTPATIENT
Start: 2025-04-02

## 2025-04-02 RX ORDER — SWAB
1 SWAB, NON-MEDICATED MISCELLANEOUS DAILY
COMMUNITY

## 2025-04-02 RX ORDER — PANTOPRAZOLE SODIUM 40 MG/1
40 TABLET, DELAYED RELEASE ORAL 2 TIMES DAILY
Qty: 60 TABLET | Refills: 1 | Status: SHIPPED | OUTPATIENT
Start: 2025-04-02

## 2025-04-02 NOTE — PROGRESS NOTES
Name: Krysta Santos      : 2000      MRN: [id number]  Encounter Provider: Lina Langston PA-C  Encounter Date: 2025   Encounter department: [facility] [place]  :  Assessment & Plan  Gastroesophageal reflux disease, unspecified whether esophagitis present    Chronic cough    Patient presents for an evaluation of GERD.  She reports of reflux, chronic cough, halitosis and recurrent cavities.    Will plan for EGD to investigate.  Will begin a PPI BID course x 2-3 months.  GERD modifications reviewed.  Patient also requested food allergy and celiac testing which was ordered for the patient to complete.  Note: She is breast-feeding and will review with anesthesia when to resume after the procedure/anesthesia.    Orders:    pantoprazole (PROTONIX) 40 mg tablet; Take 1 tablet (40 mg total) by mouth 2 (two) times a day    EGD; Future        History of Present Illness   HPI  Krysta Santos is a 25 y.o. female who presents to the office for a GI evaluation of GERD.  Patient reports of reflux, chronic cough, throat clearing, halitosis, and recurrent cavities.  She has never had an EGD.  She took Pantoprazole daily for a month but didn't notice resolution.  She is breast feeding.  She is going to ask her PCP For a referral to ENT as well.     I discussed informed consent with the patient for the EGD. The risks/benefits of the procedure were discussed with the patient. Risks included, but not limited to, infection, bleeding, perforation were discussed. Patient was agreeable.   History obtained from: patient    Review of Systems   Constitutional: Negative.    HENT: Negative.     Eyes: Negative.    Respiratory:  Positive for cough.    Gastrointestinal:  Positive for abdominal pain and nausea.        Reflux/GERD.   Endocrine: Negative.    Genitourinary: Negative.    Musculoskeletal: Negative.    Skin: Negative.    Neurological: Negative.    Psychiatric/Behavioral: Negative.        Medical History Reviewed by provider this encounter:  Meds     .  Past Medical History   Past Medical History:   Diagnosis Date    Anxiety 2024    specifically about nausea in pregnancy and labor    Depression 2020    discontinued medications in pregnancy    Family history of lupus anticoagulant disorder     father of patient    Family history of MTHFR deficiency     Father of patient-    Varicella 2004    had disease     Past Surgical History:   Procedure Laterality Date    LYMPHANGIOMA EXCISION  2014     Family History   Problem Relation Age of Onset    Thyroid disease Mother     Deep vein thrombosis Father     Hypertension Father     Stroke Father     Heart disease Maternal Grandmother     Hypertension Maternal Grandmother     Heart attack Maternal Grandfather     Diabetes Paternal Grandmother     Cancer Paternal Grandmother     Heart disease Paternal Grandmother     Cancer Paternal Grandfather         Prostate    Parkinsonism Paternal Grandfather       reports that she has never smoked. She has never used smokeless tobacco. She reports that she does not drink alcohol and does not use drugs.  Current Outpatient Medications   Medication Instructions    estradiol (ESTRACE VAGINAL) 1 g, Vaginal, 2 times weekly    ferrous sulfate 324 mg, Oral, Daily before breakfast    pantoprazole (PROTONIX) 40 mg, Oral, 2 times daily    Prenatal Vit-Fe Fumarate-FA (prenatal multivitamin) 28-0.8 MG TABS 1 tablet, Daily    sertraline (ZOLOFT) 25 mg, Oral, Daily   No Known Allergies   Current Outpatient Medications on File Prior to Visit   Medication Sig Dispense Refill    estradiol (ESTRACE VAGINAL) 0.1 mg/g vaginal cream Insert 1 g into the vagina 2 (two) times a week 42.5 g 0    ferrous sulfate 324 (65 Fe) mg Take 1 tablet (324 mg total) by mouth daily before breakfast 30 tablet 3    Prenatal Vit-Fe Fumarate-FA (prenatal multivitamin) 28-0.8 MG TABS Take 1 tablet by mouth daily      sertraline (Zoloft) 50 mg tablet Take 0.5  "tablets (25 mg total) by mouth daily (Patient taking differently: Take 75 mg by mouth daily) 90 tablet 3    [DISCONTINUED] pantoprazole (PROTONIX) 40 mg tablet TAKE 1 TABLET BY MOUTH EVERY DAY 30 tablet 5    [DISCONTINUED] Ppefmv-FiGew-QpMtx-FA-CA-Omega (Complete  DHA) 29-1-200 & 200 MG MISC TAKE AS DIRECTED BY MOUTH EVERY DAY      [DISCONTINUED] Prenatal MV-Min-Fe Fum-FA-DHA (Prenatal+DHA) 28-0.975 & 200 MG MISC 1 tab po daily 30 each 9     No current facility-administered medications on file prior to visit.      Social History     Tobacco Use    Smoking status: Never    Smokeless tobacco: Never    Tobacco comments:     None   Vaping Use    Vaping status: Never Used   Substance and Sexual Activity    Alcohol use: Never     Comment: occasional- rarely- not with pregnancy    Drug use: Never    Sexual activity: Yes     Partners: Male     Birth control/protection: None        Objective   /70   Pulse 88   Ht 5' 2\" (1.575 m)   Wt 49.4 kg (109 lb)   SpO2 98%   BMI 19.94 kg/m²      Physical Exam  Constitutional:       Appearance: Normal appearance.   HENT:      Head: Normocephalic and atraumatic.   Cardiovascular:      Rate and Rhythm: Normal rate and regular rhythm.   Pulmonary:      Effort: Pulmonary effort is normal. No respiratory distress.      Breath sounds: Normal breath sounds.   Skin:     General: Skin is warm and dry.   Neurological:      Mental Status: She is alert and oriented to person, place, and time.   Psychiatric:         Mood and Affect: Mood normal.         Behavior: Behavior normal.           "

## 2025-04-02 NOTE — PATIENT INSTRUCTIONS
Scheduled date of EGD(as of today): 4/28/25  Physician performing EGD: eli  Location of EGD: [place]  Instructions reviewed with patient by: Erika CORONA  Clearances:

## 2025-04-02 NOTE — H&P (VIEW-ONLY)
Name: Krysta Santos      : 2000      MRN: [id number]  Encounter Provider: Lina Langston PA-C  Encounter Date: 2025   Encounter department: [facility] [place]  :  Assessment & Plan  Gastroesophageal reflux disease, unspecified whether esophagitis present    Chronic cough    Patient presents for an evaluation of GERD.  She reports of reflux, chronic cough, halitosis and recurrent cavities.    Will plan for EGD to investigate.  Will begin a PPI BID course x 2-3 months.  GERD modifications reviewed.  Patient also requested food allergy and celiac testing which was ordered for the patient to complete.  Note: She is breast-feeding and will review with anesthesia when to resume after the procedure/anesthesia.    Orders:    pantoprazole (PROTONIX) 40 mg tablet; Take 1 tablet (40 mg total) by mouth 2 (two) times a day    EGD; Future        History of Present Illness   HPI  Krysta Santos is a 25 y.o. female who presents to the office for a GI evaluation of GERD.  Patient reports of reflux, chronic cough, throat clearing, halitosis, and recurrent cavities.  She has never had an EGD.  She took Pantoprazole daily for a month but didn't notice resolution.  She is breast feeding.  She is going to ask her PCP For a referral to ENT as well.     I discussed informed consent with the patient for the EGD. The risks/benefits of the procedure were discussed with the patient. Risks included, but not limited to, infection, bleeding, perforation were discussed. Patient was agreeable.   History obtained from: patient    Review of Systems   Constitutional: Negative.    HENT: Negative.     Eyes: Negative.    Respiratory:  Positive for cough.    Gastrointestinal:  Positive for abdominal pain and nausea.        Reflux/GERD.   Endocrine: Negative.    Genitourinary: Negative.    Musculoskeletal: Negative.    Skin: Negative.    Neurological: Negative.    Psychiatric/Behavioral: Negative.        Medical History Reviewed by provider this encounter:  Meds     .  Past Medical History   Past Medical History:   Diagnosis Date    Anxiety 2024    specifically about nausea in pregnancy and labor    Depression 2020    discontinued medications in pregnancy    Family history of lupus anticoagulant disorder     father of patient    Family history of MTHFR deficiency     Father of patient-    Varicella 2004    had disease     Past Surgical History:   Procedure Laterality Date    LYMPHANGIOMA EXCISION  2014     Family History   Problem Relation Age of Onset    Thyroid disease Mother     Deep vein thrombosis Father     Hypertension Father     Stroke Father     Heart disease Maternal Grandmother     Hypertension Maternal Grandmother     Heart attack Maternal Grandfather     Diabetes Paternal Grandmother     Cancer Paternal Grandmother     Heart disease Paternal Grandmother     Cancer Paternal Grandfather         Prostate    Parkinsonism Paternal Grandfather       reports that she has never smoked. She has never used smokeless tobacco. She reports that she does not drink alcohol and does not use drugs.  Current Outpatient Medications   Medication Instructions    estradiol (ESTRACE VAGINAL) 1 g, Vaginal, 2 times weekly    ferrous sulfate 324 mg, Oral, Daily before breakfast    pantoprazole (PROTONIX) 40 mg, Oral, 2 times daily    Prenatal Vit-Fe Fumarate-FA (prenatal multivitamin) 28-0.8 MG TABS 1 tablet, Daily    sertraline (ZOLOFT) 25 mg, Oral, Daily   No Known Allergies   Current Outpatient Medications on File Prior to Visit   Medication Sig Dispense Refill    estradiol (ESTRACE VAGINAL) 0.1 mg/g vaginal cream Insert 1 g into the vagina 2 (two) times a week 42.5 g 0    ferrous sulfate 324 (65 Fe) mg Take 1 tablet (324 mg total) by mouth daily before breakfast 30 tablet 3    Prenatal Vit-Fe Fumarate-FA (prenatal multivitamin) 28-0.8 MG TABS Take 1 tablet by mouth daily      sertraline (Zoloft) 50 mg tablet Take 0.5  "tablets (25 mg total) by mouth daily (Patient taking differently: Take 75 mg by mouth daily) 90 tablet 3    [DISCONTINUED] pantoprazole (PROTONIX) 40 mg tablet TAKE 1 TABLET BY MOUTH EVERY DAY 30 tablet 5    [DISCONTINUED] Nazmjd-ApLti-TlUzf-FA-CA-Omega (Complete  DHA) 29-1-200 & 200 MG MISC TAKE AS DIRECTED BY MOUTH EVERY DAY      [DISCONTINUED] Prenatal MV-Min-Fe Fum-FA-DHA (Prenatal+DHA) 28-0.975 & 200 MG MISC 1 tab po daily 30 each 9     No current facility-administered medications on file prior to visit.      Social History     Tobacco Use    Smoking status: Never    Smokeless tobacco: Never    Tobacco comments:     None   Vaping Use    Vaping status: Never Used   Substance and Sexual Activity    Alcohol use: Never     Comment: occasional- rarely- not with pregnancy    Drug use: Never    Sexual activity: Yes     Partners: Male     Birth control/protection: None        Objective   /70   Pulse 88   Ht 5' 2\" (1.575 m)   Wt 49.4 kg (109 lb)   SpO2 98%   BMI 19.94 kg/m²      Physical Exam  Constitutional:       Appearance: Normal appearance.   HENT:      Head: Normocephalic and atraumatic.   Cardiovascular:      Rate and Rhythm: Normal rate and regular rhythm.   Pulmonary:      Effort: Pulmonary effort is normal. No respiratory distress.      Breath sounds: Normal breath sounds.   Skin:     General: Skin is warm and dry.   Neurological:      Mental Status: She is alert and oriented to person, place, and time.   Psychiatric:         Mood and Affect: Mood normal.         Behavior: Behavior normal.           "

## 2025-04-03 LAB
ALMOND IGE QN: <0.1 KUA/I (ref 0–0.1)
CASHEW NUT IGE QN: <0.1 KUA/I (ref 0–0.1)
CODFISH IGE QN: <0.1 KUA/I (ref 0–0.1)
EGG WHITE IGE QN: <0.1 KUA/I (ref 0–0.1)
GLUTEN IGE QN: <0.1 KUA/I (ref 0–0.1)
HAZELNUT IGE QN: <0.1 KUA/L (ref 0–0.1)
MILK IGE QN: <0.1 KUA/I (ref 0–0.1)
PEANUT IGE QN: <0.1 KUA/I (ref 0–0.1)
SALMON IGE QN: <0.1 KUA/I (ref 0–0.1)
SCALLOP IGE QN: <0.1 KUA/L (ref 0–0.1)
SESAME SEED IGE QN: <0.1 KUA/I (ref 0–0.1)
SHRIMP IGE QN: <0.1 KUA/L (ref 0–0.1)
SOYBEAN IGE QN: <0.1 KUA/I (ref 0–0.1)
TOTAL IGE SMQN RAST: 60.8 KU/L (ref 0–113)
TUNA IGE QN: <0.1 KUA/I (ref 0–0.1)
WALNUT IGE QN: <0.1 KUA/I (ref 0–0.1)
WHEAT IGE QN: <0.1 KUA/I (ref 0–0.1)

## 2025-04-04 ENCOUNTER — RESULTS FOLLOW-UP (OUTPATIENT)
Dept: GASTROENTEROLOGY | Facility: CLINIC | Age: 25
End: 2025-04-04

## 2025-04-04 LAB — TTG IGA SER IA-ACNC: <0.4 U/ML (ref ?–10)

## 2025-04-28 ENCOUNTER — HOSPITAL ENCOUNTER (OUTPATIENT)
Dept: GASTROENTEROLOGY | Facility: HOSPITAL | Age: 25
Setting detail: OUTPATIENT SURGERY
Discharge: HOME | End: 2025-04-28
Attending: PHYSICIAN ASSISTANT
Payer: COMMERCIAL

## 2025-04-28 ENCOUNTER — ANESTHESIA EVENT (OUTPATIENT)
Dept: GASTROENTEROLOGY | Facility: HOSPITAL | Age: 25
End: 2025-04-28
Payer: COMMERCIAL

## 2025-04-28 ENCOUNTER — ANESTHESIA (OUTPATIENT)
Dept: GASTROENTEROLOGY | Facility: HOSPITAL | Age: 25
End: 2025-04-28
Payer: COMMERCIAL

## 2025-04-28 VITALS
BODY MASS INDEX: 20.37 KG/M2 | RESPIRATION RATE: 18 BRPM | TEMPERATURE: 97.2 F | OXYGEN SATURATION: 100 % | HEIGHT: 62 IN | DIASTOLIC BLOOD PRESSURE: 64 MMHG | SYSTOLIC BLOOD PRESSURE: 111 MMHG | WEIGHT: 110.67 LBS | HEART RATE: 72 BPM

## 2025-04-28 DIAGNOSIS — R05.3 CHRONIC COUGH: ICD-10-CM

## 2025-04-28 DIAGNOSIS — K21.9 GASTROESOPHAGEAL REFLUX DISEASE, UNSPECIFIED WHETHER ESOPHAGITIS PRESENT: ICD-10-CM

## 2025-04-28 LAB
EXT PREGNANCY TEST URINE: NEGATIVE
EXT. CONTROL: NORMAL

## 2025-04-28 PROCEDURE — 88305 TISSUE EXAM BY PATHOLOGIST: CPT | Performed by: PATHOLOGY

## 2025-04-28 PROCEDURE — 43239 EGD BIOPSY SINGLE/MULTIPLE: CPT | Performed by: INTERNAL MEDICINE

## 2025-04-28 PROCEDURE — 88312 SPECIAL STAINS GROUP 1: CPT | Performed by: PATHOLOGY

## 2025-04-28 PROCEDURE — 81025 URINE PREGNANCY TEST: CPT | Performed by: STUDENT IN AN ORGANIZED HEALTH CARE EDUCATION/TRAINING PROGRAM

## 2025-04-28 RX ORDER — LIDOCAINE HYDROCHLORIDE 20 MG/ML
INJECTION, SOLUTION EPIDURAL; INFILTRATION; INTRACAUDAL; PERINEURAL AS NEEDED
Status: DISCONTINUED | OUTPATIENT
Start: 2025-04-28 | End: 2025-04-28

## 2025-04-28 RX ORDER — PROPOFOL 10 MG/ML
INJECTION, EMULSION INTRAVENOUS AS NEEDED
Status: DISCONTINUED | OUTPATIENT
Start: 2025-04-28 | End: 2025-04-28

## 2025-04-28 RX ORDER — SODIUM CHLORIDE, SODIUM LACTATE, POTASSIUM CHLORIDE, CALCIUM CHLORIDE 600; 310; 30; 20 MG/100ML; MG/100ML; MG/100ML; MG/100ML
INJECTION, SOLUTION INTRAVENOUS CONTINUOUS PRN
Status: DISCONTINUED | OUTPATIENT
Start: 2025-04-28 | End: 2025-04-28

## 2025-04-28 RX ORDER — SODIUM CHLORIDE, SODIUM LACTATE, POTASSIUM CHLORIDE, CALCIUM CHLORIDE 600; 310; 30; 20 MG/100ML; MG/100ML; MG/100ML; MG/100ML
125 INJECTION, SOLUTION INTRAVENOUS CONTINUOUS
Status: DISCONTINUED | OUTPATIENT
Start: 2025-04-28 | End: 2025-05-02 | Stop reason: HOSPADM

## 2025-04-28 RX ADMIN — LIDOCAINE HYDROCHLORIDE 80 MG: 20 INJECTION, SOLUTION EPIDURAL; INFILTRATION; INTRACAUDAL; PERINEURAL at 10:02

## 2025-04-28 RX ADMIN — PROPOFOL 50 MG: 10 INJECTION, EMULSION INTRAVENOUS at 10:04

## 2025-04-28 RX ADMIN — SODIUM CHLORIDE, SODIUM LACTATE, POTASSIUM CHLORIDE, AND CALCIUM CHLORIDE: .6; .31; .03; .02 INJECTION, SOLUTION INTRAVENOUS at 09:59

## 2025-04-28 RX ADMIN — PROPOFOL 150 MG: 10 INJECTION, EMULSION INTRAVENOUS at 10:02

## 2025-04-28 RX ADMIN — SODIUM CHLORIDE, SODIUM LACTATE, POTASSIUM CHLORIDE, AND CALCIUM CHLORIDE 125 ML/HR: .6; .31; .03; .02 INJECTION, SOLUTION INTRAVENOUS at 09:23

## 2025-04-28 NOTE — INTERVAL H&P NOTE
H&P reviewed. After examining the patient I find no changes in the patients condition since the H&P had been written.    Vitals:    04/28/25 0915   BP: 103/65   Pulse: 74   Resp: 18   Temp: (!) 97.1 °F (36.2 °C)   SpO2: 100%

## 2025-04-28 NOTE — ANESTHESIA POSTPROCEDURE EVALUATION
Post-Op Assessment Note    CV Status:  Stable  Pain Score: 0    Pain management: adequate       Mental Status:  Sleepy   Hydration Status:  Euvolemic   PONV Controlled:  Controlled   Airway Patency:  Patent     Post Op Vitals Reviewed: Yes    No anethesia notable event occurred.    Staff: CRNA           Last Filed PACU Vitals:  Vitals Value Taken Time   Temp 97.3    Pulse 70    BP 95/52    Resp 14    SpO2 95

## 2025-04-28 NOTE — ANESTHESIA PREPROCEDURE EVALUATION
Procedure:  EGD    Relevant Problems   ANESTHESIA (within normal limits)      CARDIO   (+) External hemorrhoids with complication   (-) COOK (dyspnea on exertion)      NEURO/PSYCH   (+) Anxiety   (+) Postpartum depression   (-) Seizures (HCC)      PULMONARY   (-) Asthma   (-) URI (upper respiratory infection)        Physical Exam    Airway    Mallampati score: II  TM Distance: >3 FB  Neck ROM: full     Dental   No notable dental hx     Cardiovascular  Cardiovascular exam normal    Pulmonary  Pulmonary exam normal     Other Findings  post-pubertal.      Anesthesia Plan  ASA Score- 2     Anesthesia Type- IV sedation with anesthesia with ASA Monitors.         Additional Monitors:     Airway Plan:            Plan Factors-Exercise tolerance (METS): >4 METS.    Chart reviewed. EKG reviewed. Imaging results reviewed. Existing labs reviewed. Patient summary reviewed.    Patient is not a current smoker.      There is medical exclusion for perioperative obstructive sleep apnea risk education.        Induction- intravenous.    Postoperative Plan-         Informed Consent- Anesthetic plan and risks discussed with patient.  I personally reviewed this patient with the CRNA. Discussed and agreed on the Anesthesia Plan with the CRNA..      NPO Status:  Vitals Value Taken Time   Date of last liquid 04/28/25 04/28/25 0908   Time of last liquid 0300 04/28/25 0908   Date of last solid 04/27/25 04/28/25 0908   Time of last solid 2200 04/28/25 0908

## 2025-05-02 PROCEDURE — 88305 TISSUE EXAM BY PATHOLOGIST: CPT | Performed by: PATHOLOGY

## 2025-05-02 PROCEDURE — 88312 SPECIAL STAINS GROUP 1: CPT | Performed by: PATHOLOGY
